# Patient Record
Sex: FEMALE | Race: WHITE | NOT HISPANIC OR LATINO | Employment: FULL TIME | URBAN - METROPOLITAN AREA
[De-identification: names, ages, dates, MRNs, and addresses within clinical notes are randomized per-mention and may not be internally consistent; named-entity substitution may affect disease eponyms.]

---

## 2017-01-11 ENCOUNTER — ONCOLOGY VISIT (OUTPATIENT)
Dept: ONCOLOGY | Facility: CLINIC | Age: 57
End: 2017-01-11
Attending: INTERNAL MEDICINE
Payer: COMMERCIAL

## 2017-01-11 VITALS
DIASTOLIC BLOOD PRESSURE: 88 MMHG | SYSTOLIC BLOOD PRESSURE: 148 MMHG | HEART RATE: 101 BPM | OXYGEN SATURATION: 100 % | RESPIRATION RATE: 16 BRPM | TEMPERATURE: 98.1 F | WEIGHT: 121.4 LBS | BODY MASS INDEX: 22.95 KG/M2

## 2017-01-11 DIAGNOSIS — Z78.0 MENOPAUSE: ICD-10-CM

## 2017-01-11 DIAGNOSIS — Z12.11 ENCOUNTER FOR SCREENING COLONOSCOPY: ICD-10-CM

## 2017-01-11 DIAGNOSIS — Z85.3 PERSONAL HISTORY OF MALIGNANT NEOPLASM OF BREAST: Primary | ICD-10-CM

## 2017-01-11 DIAGNOSIS — R25.2 CRAMP OF LIMB: ICD-10-CM

## 2017-01-11 DIAGNOSIS — Z80.0 FAMILY HISTORY OF COLON CANCER: ICD-10-CM

## 2017-01-11 PROCEDURE — 99211 OFF/OP EST MAY X REQ PHY/QHP: CPT

## 2017-01-11 PROCEDURE — 99214 OFFICE O/P EST MOD 30 MIN: CPT | Performed by: INTERNAL MEDICINE

## 2017-01-11 ASSESSMENT — PAIN SCALES - GENERAL: PAINLEVEL: NO PAIN (0)

## 2017-01-11 NOTE — PATIENT INSTRUCTIONS
Due Dexa now. Due colonoscopy, refer to Dr. Tavo Flores in [facility].   1 yr f/u with labs.

## 2017-01-11 NOTE — PROGRESS NOTES
"Maryse Hanson is a 56 year old female who presents for:  Chief Complaint   Patient presents with     Oncology Clinic Visit     1 year F/U Breast Ca         Initial Vitals:  /88 mmHg  Pulse 101  Temp(Src) 98.1  F (36.7  C) (Oral)  Resp 16  Wt 55.067 kg (121 lb 6.4 oz)  SpO2 100% Estimated body mass index is 22.95 kg/(m^2) as calculated from the following:    Height as of 7/22/16: 1.549 m (5' 1\").    Weight as of this encounter: 55.067 kg (121 lb 6.4 oz).. There is no height on file to calculate BSA. BP completed using cuff size: regular  No Pain (0) No LMP recorded. Patient is postmenopausal. Allergies and medications reviewed.     Medications: Medication refills not needed today.  Pharmacy name entered into [employer]:    [id number] - [place], [place] - [address] [facility][id number] - [place], [place] - [address]    Comments: None    6 minutes for nursing intake (face to face time)   Mireille Fletcher CMA    DISCHARGE PLAN:   1.) Patient to be scheduled for Bone Density and Colonoscopy   2..) Patient to be scheduled for follow up with Dr. Mejia with labs prior  Next appointments: See patient instruction section  Departure Mode: Ambulatory  Accompanied by: self  5 minutes for nursing discharge (face to face time)   Ines Fields RN            "

## 2017-01-11 NOTE — PROGRESS NOTES
CHIEF COMPLAINT AND REASON FOR VISIT: left breast cancer, s/p  Arimidex, now on tamoxifen since 12/2013.   HISTORY OF PRESENT ILLNESS: She was diagnosed with left breast cancer, infiltrating ductal cancer, grade 3, ER/TN positive, HER-2 negative, 8/14 positive nodes, modified left radical mastectomy 03/2006, status post dose-dense Adriamycin and cyclophosphamide followed by Taxol after mastectomy, finished 08/2006. Finished radiation 10/2006. Arimidex was started in 09/2006. She was not willing to be off it. It was continued to 2013 when 10 yrs tamoxifen data came out. It was changed to tamoxifen in 12/2013.    Maryse Hanson had a prophylactic right mastectomy in 12/2009 and immediate reconstruction. The left side is a TRAM flap and the right side is an implant. Final pathology from the right breast indicating proliferative fibrocystic changes, radial scar, sclerosing adenosis, sclerosed fibroadenoma.     PAST MEDICAL HISTORY: Denies.     MEDICATIONS/ALLERGIES: reviewed in [facility]    OB/GYN HISTORY: Hysterectomy, bilateral salpingo-oophorectomy in 04/2007    FAMILY HISTORY: Strong family history of colon cancer, maternal side. She was tested negative  for BRCA1 and BRCA2.     SOCIAL HISTORY: She is .  She works in [employer]. She and daughter moved to [place]. Her twin boys are in college.     REVIEW OF SYSTEMS: Weight has been stable. She is very upbeat, taking vitamin D. No breast complaints. Occasional leg cramp.   She had colonoscopy 3/2015. She had Glu, cr and vit D with her old PMD via [place] in 2015.     PHYSICAL EXAMINATION:   VITAL SIGNS: Blood pressure 148/88, pulse 101, temperature 98.1  F (36.7  C), temperature source Oral, resp. rate 16, weight 55.067 kg (121 lb 6.4 oz), SpO2 100 %.  GENERAL APPEARANCE: A young woman, looks like her stated age, not in acute distress.   BREASTS: Bilateral implant on left side is a TRAM flap and the right side is implant. She also got her bilateral nipple  nguyễn.   HEENT: The patient is normocephalic, atraumatic. Pupils are equal react to light. Sclerae are anicteric. Moist oral mucosa. Negative pharynx. No oral thrush.   NECK: Supple. No jugular venous distention. Thyroid is not palpable.   LYMPH NODES: Superficial lymphadenopathy is not appreciable in the bilateral cervical, supraclavicular, axillary or inguinal adenopathy.   CARDIOVASCULAR: S1, S2 regular with no murmurs or gallops. No carotid or abdominal bruits.   PULMONARY: Lungs are clear to auscultation and percussion bilaterally. There is no wheezing or rhonchi.   GASTROINTESTINAL: Abdomen is soft, nontender. No hepatosplenomegaly. No signs of ascites. No mass appreciable.   MUSCULOSKELETAL/EXTREMITIES: No edema. No cyanotic changes. No signs of joint deformity. No lymphedema.   NEUROLOGIC: Cranial nerves II-XII are grossly intact. Sensation intact. Muscle strength and muscle tone symmetrical all through 5/5.   BACK: No spinal or paraspinal tenderness. No CVA tenderness.   SKIN: No petechiae. No rash. No signs of cellulitis.     CURRENT LAB DATA:   WBC, Marker, liver panel are satisfactory    OLD DATA REVIEWED TODAY WITH SUMMARY:   CXR/BONE SCAN 10/2013 - negative.  PET scan 07/2011 was negative for any malignancy. PET scan in summer of 2010 was negative for malignancy.     ASSESSMENT AND PLAN:   1. Early stage high risk breast cancer 2006 was on 7 yrs of Arimidex.  It was continued to 2013 when 10 yrs tamoxifen data came out. It was changed to tamoxifen in 12/2013.    We talked about the side effects from tamoxifen and how she should be monitored.   We talked about the recurrence pattern associated with ER + breast cancer and how we follow them up long term.    She is a good candidate for 10 yrs of tamoxifen.    She is due dexa.     She is on yrly follow up plan.     2. Leg cramp from tomoxifen. Advise avoiding dehydration and Mag/K intake. She feels it is helping.     3. FH of colon cancer. She has been on  colonoscopy q 2 yrs and she has moved to [facility], she would like to find a new MD doing colonoscopy. Will refer her to Dr. Flores at [facility].     4. Slight rising BP. She is not interested in meds now. We talked about life style changes that may impact the BP.

## 2017-01-11 NOTE — MR AVS SNAPSHOT
After Visit Summary   1/11/2017    Maryse Hanson    MRN: [id number]           Patient Information     Date Of Birth          1960        Visit Information        Provider Department      1/11/2017 2:00 PM Vannessa Mejia MD [facility]        Today's Diagnoses     Personal history of malignant neoplasm of breast    -  1     Cramp of limb         Menopause         Encounter for screening colonoscopy           Care Instructions    Due Dexa now. Due colonoscopy, refer to Dr. Tavo Flores in [facility].   1 yr f/u with labs.        Follow-ups after your visit        Additional Services     GASTROENTEROLOGY ADULT REFERRAL +/- PROCEDURE       Your provider has referred you to Gastroenterology Services.    English    Procedure/Referral: PROCEDURE ONLY - COLONOSCOPY - Reason for procedure: Screening  FM[facility]: [facility]  (all patients will be contacted by GI Geri to schedule appointment - This includes [facility], [facility] and [facility]) - [place] [phone]   [email or web address] Screening, history of colon cancer. Please refer to Dr. Tavo Flores.      Please be aware that coverage of these services is subject to the terms and limitations of your health insurance plan.  Call member services at your health plan with any benefit or coverage questions.  Any procedures must be performed at a [place] facility OR coordinated by your clinic's referral office.    Please bring the following with you to your appointment:    (1) Any X-Rays, CTs or MRIs which have been performed.  Contact the facility where they were done to arrange for  prior to your scheduled appointment.    (2) List of current medications   (3) This referral request   (4) Any documents/labs given to you for this referral                  Your next 10 appointments already scheduled     Jan 20, 2017   "9:30 AM   DX HIP/PELVIS/SPINE with [id number]   [facility] ([facility])    [address]  [address]  [facility] [id number] [phone]           Please do not take any of the following 48 hours prior to your exam: vitamins, calcium tablets, antacids.            Wes 10, 2018  9:15 AM   Return Visit with Vannessa Mejia MD   [facility] ([facility])    [facility]  [address]  [facility] [id number] [phone]              Future tests that were ordered for you today     Open Future Orders        Priority Expected Expires Ordered    DX Hip/Pelvis/Spine Routine  1/11/2018 1/11/2017    Ca27.29  breast tumor marker Routine 12/1/2017 1/10/2018 1/11/2017    CBC with platelets differential Routine 12/1/2017 1/10/2018 1/11/2017    Hepatic panel Routine 12/1/2017 1/10/2018 1/11/2017    DX Hip/Pelvis/Spine Routine  1/11/2018 1/11/2017            Who to contact     If you have questions or need follow up information about today's clinic visit or your schedule please contact [facility] directly at [phone].  Normal or non-critical lab and imaging results will be communicated to you by MyChart, letter or phone within 4 business days after the clinic has received the results. If you do not hear from us within 7 days, please contact the clinic through MyChart or phone. If you have a critical or abnormal lab result, we will notify you by phone as soon as possible.  Submit refill requests through [employer] or call your pharmacy and they will forward the refill request to us. Please allow 3 business days for your refill to be completed.          Additional Information About Your Visit        [employer] Information     [employer] lets you send messages to your doctor, view your test results, renew your prescriptions, schedule appointments and more. To sign up, go to [email or web address] . Click on \"Log in\" on the left side of " "the screen, which will take you to the Welcome page. Then click on \"Sign up Now\" on the right side of the page.     You will be asked to enter the access code listed below, as well as some personal information. Please follow the directions to create your username and password.     Your access code is: [email or web address]  Expires: 2017  2:45 PM     Your access code will  in 90 days. If you need help or a new code, please call your [facility] or [phone].        Care EveryWhere ID     This is your Care EveryWhere ID. This could be used by other organizations to access your [place] medical records  [id number]        Your Vitals Were     Pulse Temperature Respirations Pulse Oximetry          101 98.1  F (36.7  C) (Oral) 16 100%         Blood Pressure from Last 3 Encounters:   17 148/88   16 132/78   16 139/85    Weight from Last 3 Encounters:   17 55.067 kg (121 lb 6.4 oz)   16 55.248 kg (121 lb 12.8 oz)   16 58.06 kg (128 lb)              We Performed the Following     GASTROENTEROLOGY ADULT REFERRAL +/- PROCEDURE        Primary Care Provider    None Specified       No primary provider on file.        Thank you!     Thank you for choosing [facility]  for your care. Our goal is always to provide you with excellent care. Hearing back from our patients is one way we can continue to improve our services. Please take a few minutes to complete the written survey that you may receive in the mail after your visit with us. Thank you!             Your Updated Medication List - Protect others around you: Learn how to safely use, store and throw away your medicines at www.disposemymeds.org.          This list is accurate as of: 17  2:46 PM.  Always use your most recent med list.                   Brand Name Dispense Instructions for use    aspirin 81 MG tablet      Take 1 tablet by mouth daily.       CALCIUM + D + K 750-500-40 MG-UNT-MCG Tabs   Generic drug:  " Calcium-Vitamin D-Vitamin K      1 TABLET DAILY WITH FOOD       cholecalciferol 25206 UNITS capsule    VITAMIN D3    24 capsule    Take 1 tablet twice weekly       MULTI-DAY VITAMINS PO      1 daily       OMEGA-3 FISH OIL PO          tamoxifen 20 MG tablet    NOLVADEX    90 tablet    Take 1 tablet (20 mg) by mouth daily       triamcinolone 0.1 % ointment    KENALOG    30 g    Apply sparingly to affected area three times daily for 14 days.

## 2017-02-07 ENCOUNTER — OFFICE VISIT (OUTPATIENT)
Dept: FAMILY MEDICINE | Facility: CLINIC | Age: 57
End: 2017-02-07
Payer: COMMERCIAL

## 2017-02-07 VITALS
DIASTOLIC BLOOD PRESSURE: 92 MMHG | HEIGHT: 61 IN | TEMPERATURE: 97.8 F | HEART RATE: 86 BPM | OXYGEN SATURATION: 99 % | RESPIRATION RATE: 16 BRPM | SYSTOLIC BLOOD PRESSURE: 152 MMHG | BODY MASS INDEX: 22.6 KG/M2 | WEIGHT: 119.7 LBS

## 2017-02-07 DIAGNOSIS — C50.919 MALIGNANT NEOPLASM OF FEMALE BREAST, UNSPECIFIED LATERALITY, UNSPECIFIED SITE OF BREAST: ICD-10-CM

## 2017-02-07 DIAGNOSIS — J31.0 CHRONIC RHINITIS: ICD-10-CM

## 2017-02-07 DIAGNOSIS — Z78.9 ALCOHOL USE: ICD-10-CM

## 2017-02-07 DIAGNOSIS — Z12.11 COLON CANCER SCREENING: ICD-10-CM

## 2017-02-07 DIAGNOSIS — Z80.0 FAMILY HISTORY OF COLON CANCER IN MOTHER: ICD-10-CM

## 2017-02-07 DIAGNOSIS — I10 BENIGN ESSENTIAL HYPERTENSION: Primary | ICD-10-CM

## 2017-02-07 DIAGNOSIS — E55.9 VITAMIN D DEFICIENCY: ICD-10-CM

## 2017-02-07 DIAGNOSIS — Z90.13 H/O BILATERAL MASTECTOMY: ICD-10-CM

## 2017-02-07 PROCEDURE — 99214 OFFICE O/P EST MOD 30 MIN: CPT | Performed by: FAMILY MEDICINE

## 2017-02-07 RX ORDER — LISINOPRIL 10 MG/1
10 TABLET ORAL DAILY
Qty: 30 TABLET | Refills: 1 | Status: SHIPPED | OUTPATIENT
Start: 2017-02-07 | End: 2017-06-23

## 2017-02-07 NOTE — PROGRESS NOTES
SUBJECTIVE:                                                    Maryse Hanson is a 56 year old female who presents to clinic today for the following health issues:      Chief Complaint   Patient presents with     Establish Care     Patient here today to Establish Care.    Vasomotor rhinitis, wants refill on beclomethasone nasal spray - for post nasal dripping and prevent sinus congestion    She was diagnosed with left breast cancer, infiltrating ductal cancer, grade 3, ER/CA positive, HER-2 negative, 8/14 positive nodes, modified left radical mastectomy 03/2006, -  right side.  Had mastectomy,Initially 2007, left  Than prophylactic mastectomy in 12/2009 and immediate reconstruction.  Total abdominal hysterectomy 2009, no cancer- and was done preventative for breast recurrence all above surgeries in [facility]  She was tested negative for BRCA1 and BRCA2.      She was Under care of Dr Mejia, initially had  Adriamycin and cyclophosphamide followed by Taxol after mastectomy, finished 08/2006.   Finished radiation 10/2006.   Arimidex was started in 09/2006.   It was continued to 2013 when 10 yrs tamoxifen data came out.   It was changed to tamoxifen in 12/2013.  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       Past medical and family  "history,medications, allergies and problem list reviewed       ROS: 10 point ROS neg other than the symptoms noted above in the HPI.    EXAM:/68 mmHg  Pulse 86  Temp(Src) 97.8  F (36.6  C) (Oral)  Resp 16  Ht 5' 1\" (1.549 m)  Wt 119 lb 11.2 oz (54.296 kg)  BMI 22.63 kg/m2  SpO2 99%  Breastfeeding? No  Constitutional: healthy, alert and no distress   Head: Normocephalic. No masses, lesions, tenderness or abnormalities  Eyes; PERRLA EOMI  Neck: Neck supple. No adenopathy. Thyroid symmetric, normal size  Ear,Nose, clear . Throat: normal, oropharynx  Cardiovascular: negative,  RRR. No murmurs, clicks gallops or rub  Respiratory:  Lungs clear.no wheezing, crackles.  Abdomen: Abdomen soft, non-tender.no organomegaly, No CVA tenderness.  NEURO: Gait normal. Reflexes normal and symmetric. Sensation grossly WNL.  Psychiatric: mentation appears normal and affect normal/bright    ASSESSMENT / PLAN:  [place] cancer; Early stage high risk breast cancer 2006 was on 7 yrs of Arimidex.  It was continued to 2013 when 10 yrs tamoxifen data came out. It was changed to tamoxifen in 12/2013.Under care of Dr Mejia, Vannessa Moreno MD- last OV in jan 2017  left breast cancer, infiltrating ductal cancer, grade 3, ER/AZ positive, HER-2 negative, 8/14 positive nodes, modified left radical mastectomy 03/2006, -  right side.  Had mastectomy,Initially 2007, left  Than prophylactic mastectomy in 12/2009 and immediate reconstruction.  Total abdominal hysterectomy 2009, no cancer- and was done preventative for breast recurrence all above surgeries in [facility]  She was tested negative for BRCA1 and BRCA2      Hypertension  New diagnoses,Uncontrolled  Start lisinopril 10 mg once daily -follow up in 2-4 weeks  She is leaning towards life style changes- though did request to have prescription sent over- will keep us posted , if does not want to start medications  Potential medication side effects were discussed with the patient; let me know if any " occur.      Alcohol use- to limits less than 5 drinks a week      (E55.9) Vitamin D deficiency  Plan: 25 Hydroxyvitamin D2 and D3- future      (J31.0) Chronic rhinitis  Plan: Beclomethasone Dipropionate (QNASL) 80 MCG/ACT       AERS Nasal Spray        (Z80.0) Family history of colon cancer in mother  (Z12.11) Colon cancer screening  Plan: has been getting every 2 year- next due in 2017.family history of colon cancer- mom , materanl uncle & 2 aunties and mom's half sister  of colon cancer            Potential medication side effects were discussed with the patient; let me know if any occur.    The patient indicates understanding of these issues and agrees with the plan.      Julieth Perez

## 2017-02-07 NOTE — PATIENT INSTRUCTIONS
The main side effects to watch for are light headedness, a dry cough, or rare allergic reactions, such as swelling of the lips or tongue.   Most people tolerate the medication well.    It is important to check the blood tests again in 2-4 weeks to be sure you tolerate it.  Your blood pressure can be checked then too.      (I10) Benign essential hypertension  (primary encounter diagnosis)  Comment: wants to think it over   fup in 4 weeks for recheck- either nurse only or ROV for recheck  Plan: lisinopril (PRINIVIL/ZESTRIL) 10 MG tablet          (C50.911) Malignant neoplasm of right female breast, unspecified site of breast (H)      (Z90.13) H/O bilateral mastectomy      (E55.9) Vitamin D deficiency  Plan: 25 Hydroxyvitamin D2 and D3      (J31.0) Chronic rhinitis  Plan: Beclomethasone Dipropionate (QNASL) 80 MCG/ACT       AERS Nasal Spray      (Z12.11) Colon cancer screening  Plan: has been getting every 2 year- next due in 2017.family history of colon cancer- mom , materanl uncle & 2 aunties and mom's half sister  of colon cancer      (Z80.0) Family history of colon cancer in mother  Colonoscopy as per Gastroenterology     (Z78.9) Alcohol use  Plan:  Cut back to 5 or less drinks a week

## 2017-02-07 NOTE — MR AVS SNAPSHOT
After Visit Summary   2017    Maryse Hanson    MRN: [id number]           Patient Information     Date Of Birth          1960        Visit Information        Provider Department      2017 3:30 PM Julieth Perez MD [facility]        Today's Diagnoses     Benign essential hypertension    -  1     Malignant neoplasm of right female breast, unspecified site of breast (H)         H/O bilateral mastectomy         Vitamin D deficiency         Chronic rhinitis         Colon cancer screening         Family history of colon cancer in mother         Alcohol use           Care Instructions    The main side effects to watch for are light headedness, a dry cough, or rare allergic reactions, such as swelling of the lips or tongue.   Most people tolerate the medication well.    It is important to check the blood tests again in 2-4 weeks to be sure you tolerate it.  Your blood pressure can be checked then too.      (I10) Benign essential hypertension  (primary encounter diagnosis)  Comment: wants to think it over   fup in 4 weeks for recheck- either nurse only or ROV for recheck  Plan: lisinopril (PRINIVIL/ZESTRIL) 10 MG tablet          (C50.911) Malignant neoplasm of right female breast, unspecified site of breast (H)      (Z90.13) H/O bilateral mastectomy      (E55.9) Vitamin D deficiency  Plan: 25 Hydroxyvitamin D2 and D3      (J31.0) Chronic rhinitis  Plan: Beclomethasone Dipropionate (QNASL) 80 MCG/ACT       AERS Nasal Spray      (Z12.11) Colon cancer screening  Plan: has been getting every 2 year- next due in 2017.family history of colon cancer- mom , materanl uncle & 2 aunties and mom's half sister  of colon cancer      (Z80.0) Family history of colon cancer in mother  Colonoscopy as per Gastroenterology     (Z78.9) Alcohol use  Plan:  Cut back to 5 or less drinks a week            Follow-ups after your visit        Your next 10 appointments already scheduled     [place]  "02, 2017   Procedure with Trev Burks MD   [facility] ([facility])    [address]  [place] [id number] [phone]           [facility] is located at [address] [place]            Wes 10, 2018  9:15 AM   Return Visit with Vannessa Mejia MD   [facility] ([facility])    [facility]  [address]  [facility] [address] [phone]              Future tests that were ordered for you today     Open Future Orders        Priority Expected Expires Ordered    25 Hydroxyvitamin D2 and D3 Routine  2/7/2018 2/7/2017            Who to contact     If you have questions or need follow up information about today's clinic visit or your schedule please contact [facility] directly at [phone].  Normal or non-critical lab and imaging results will be communicated to you by Sferrahart, letter or phone within 4 business days after the clinic has received the results. If you do not hear from us within 7 days, please contact the clinic through Sferrahart or phone. If you have a critical or abnormal lab result, we will notify you by phone as soon as possible.  Submit refill requests through shopp or call your pharmacy and they will forward the refill request to us. Please allow 3 business days for your refill to be completed.          Additional Information About Your Visit        SferraharScubaTribe Information     shopp lets you send messages to your doctor, view your test results, renew your prescriptions, schedule appointments and more. To sign up, go to [email or web address] . Click on \"Log in\" on the left side of the screen, which will take you to the Welcome page. Then click on \"Sign up Now\" on the right side of the page.     You will be asked to enter the access code listed below, as well as some personal information. Please follow the directions to create your username and password.     Your " "access code is: [email or web address]  Expires: 2017  2:45 PM     Your access code will  in 90 days. If you need help or a new code, please call your [facility] clinic or [phone].        Care EveryWhere ID     This is your Care EveryWhere ID. This could be used by other organizations to access your [facility] medical records  [id number]        Your Vitals Were     Pulse Temperature Respirations Height BMI (Body Mass Index) Pulse Oximetry    86 97.8  F (36.6  C) (Oral) 16 5' 1\" (1.549 m) 22.63 kg/m2 99%    Breastfeeding?                   No            Blood Pressure from Last 3 Encounters:   17 152/92   17 148/88   16 132/78    Weight from Last 3 Encounters:   17 119 lb 11.2 oz (54.296 kg)   17 121 lb 6.4 oz (55.067 kg)   16 121 lb 12.8 oz (55.248 kg)                 Today's Medication Changes          These changes are accurate as of: 17  4:33 PM.  If you have any questions, ask your nurse or doctor.               Start taking these medicines.        Dose/Directions    Beclomethasone Dipropionate 80 MCG/ACT Aers Nasal Spray   Commonly known as:  QNASL   Used for:  Chronic rhinitis   Started by:  Julieth Perez MD        Dose:  1 spray   Spray 1 spray into both nostrils daily as needed   Quantity:  8.7 g   Refills:  11       lisinopril 10 MG tablet   Commonly known as:  PRINIVIL/ZESTRIL   Used for:  Benign essential hypertension   Started by:  Julieth Perez MD        Dose:  10 mg   Take 1 tablet (10 mg) by mouth daily   Quantity:  30 tablet   Refills:  1         Stop taking these medicines if you haven't already. Please contact your care team if you have questions.     beclomethasone 80 MCG/ACT Inhaler   Commonly known as:  QVAR   Stopped by:  Julieth Perez MD                Where to get your medicines      These medications were sent to [id number] [place], [place] - [address]  [address] [facility] [address]     Phone:  [phone] "    - Beclomethasone Dipropionate 80 MCG/ACT Aers Nasal Spray  - lisinopril 10 MG tablet             Primary Care Provider    None Specified       No primary provider on file.        Thank you!     Thank you for choosing [facility]  for your care. Our goal is always to provide you with excellent care. Hearing back from our patients is one way we can continue to improve our services. Please take a few minutes to complete the written survey that you may receive in the mail after your visit with us. Thank you!             Your Updated Medication List - Protect others around you: Learn how to safely use, store and throw away your medicines at www.disposemymeds.org.          This list is accurate as of: 2/7/17  4:33 PM.  Always use your most recent med list.                   Brand Name Dispense Instructions for use    aspirin 81 MG tablet      Take 1 tablet by mouth daily.       Beclomethasone Dipropionate 80 MCG/ACT Aers Nasal Spray    QNASL    8.7 g    Spray 1 spray into both nostrils daily as needed       cholecalciferol 76710 UNITS capsule    VITAMIN D3    24 capsule    Take 1 tablet twice weekly       lisinopril 10 MG tablet    PRINIVIL/ZESTRIL    30 tablet    Take 1 tablet (10 mg) by mouth daily       OMEGA-3 FISH OIL PO          tamoxifen 20 MG tablet    NOLVADEX    90 tablet    Take 1 tablet (20 mg) by mouth daily       triamcinolone 0.1 % ointment    KENALOG    30 g    Apply sparingly to affected area three times daily for 14 days.

## 2017-02-07 NOTE — NURSING NOTE
"Chief Complaint   Patient presents with     Establish Care     /68 mmHg  Pulse 86  Temp(Src) 97.8  F (36.6  C) (Oral)  Resp 16  Ht 5' 1\" (1.549 m)  Wt 119 lb 11.2 oz (54.296 kg)  BMI 22.63 kg/m2  SpO2 99%  Breastfeeding? No Estimated body mass index is 22.63 kg/(m^2) as calculated from the following:    Height as of this encounter: 5' 1\" (1.549 m).    Weight as of this encounter: 119 lb 11.2 oz (54.296 kg).  bp completed using cuff size: regular      left    Health Maintenance Due Pending Provider Review:  Mammogram and Pap Smear    Patient has had complete hyst- no need for pap per patient. (2009)  No mammo needed- hx of breast cancer- had complete Mastectomy.    Danielle Petty MA  [facility]    "

## 2017-02-07 NOTE — Clinical Note
Patient had Left breast mastectomy in 2007-  Right breast mastectomy in 2006- Total Abdominal Hysterectomy 2009

## 2017-02-07 NOTE — Clinical Note
For Health Maintenance- patient has had complete mastectomy in 2009 so no need for Mammogram.  Also, had complete hysterectomy in 2009- no need for future pap smears.

## 2017-02-21 ENCOUNTER — TELEPHONE (OUTPATIENT)
Dept: ONCOLOGY | Facility: CLINIC | Age: 57
End: 2017-02-21

## 2017-02-21 DIAGNOSIS — J31.0 CHRONIC RHINITIS: ICD-10-CM

## 2017-02-21 NOTE — TELEPHONE ENCOUNTER
Patient called, [facility] will [facility] to [facility] her bone [facility]. Writer will follow up on bone density results. Ines Fields RN

## 2017-02-21 NOTE — TELEPHONE ENCOUNTER
Pending Prescriptions:                       Disp   Refills    Beclomethasone Dipropionate (QNASL) 80 MC*8.7 g  11           Sig: Spray 1 spray into both nostrils daily as needed          Last Written Prescription Date: 02/07/2017  Last Fill Quantity: 8.7g,  # refills: 11   Last Office Visit with [facility], [facility] or [facility] prescribing provider: 02/07/2017

## 2017-02-21 NOTE — TELEPHONE ENCOUNTER
Denied  Refill request too early; Rx sent 2/7/2017 #8.7 with 11 refills for 1 year  Radha ALVARES RN

## 2017-03-08 ENCOUNTER — TELEPHONE (OUTPATIENT)
Dept: ONCOLOGY | Facility: CLINIC | Age: 57
End: 2017-03-08

## 2017-03-08 NOTE — TELEPHONE ENCOUNTER
Patient called left message on patient's voice mail for her to call clinic regarding her Dexa scan and if she rescheduled it which was [facility] at [facility]. Ines Fields RN

## 2017-03-15 DIAGNOSIS — J31.0 CHRONIC RHINITIS: ICD-10-CM

## 2017-03-15 NOTE — TELEPHONE ENCOUNTER
Reason for Call:  Medication or medication refill:    Do you use a [facility]?  Name of the pharmacy and phone number for the current request:     [id number] - [place], [place] - [address]  [id number] [place] [place] - [address]        Name of the medication requested: Beclomethasone Dipropionate    Other request: pt needs increase in dosage due copay reasons for Beclomethasone Dipropionate. Noah advise    Can we leave a detailed message on this number? YES    Phone number patient can be reached at: Home number on file [phone] (home)    Best Time: any    Call taken on 3/15/2017 at 9:00 AM by Rene Mandel

## 2017-03-15 NOTE — TELEPHONE ENCOUNTER
i have signed the nasal spray    Message needs clarification  Does she want this one prescribed or another one- from  Before.  Please t-up the one requested for me to review and sign

## 2017-03-15 NOTE — TELEPHONE ENCOUNTER
As,  Pt has a prescription for Beclomethasone Dipropionate (QNASL) 80 MCG/ACT AERS Nasal [place] that ENT from [place] prescribed and copay was 50$.  She saw you and was prescribed it as 1 spray daily and copay increased to 100$. She did not pick this up due to cost.  Has been out for a few weeks and having a lot os sinus issues, HA ect.  Requesting old sig from ENT for 1 spray in each nostril bid.  She says in the am she uses 1 spray in one nostril and in the pm uses one spray in other nostril.    She has tried other nasal sprays in the past but was very fatigued and ENT had thought it was a common preservative.  She is willing to try another nasal spray if needed but would need to find out what she had last with the preservative.  Requesting previous sig.- started order. Please review/sign advise.      Please advise.  Thanks,  Sarah Lucero RN

## 2017-03-17 ENCOUNTER — HOSPITAL ENCOUNTER (OUTPATIENT)
Dept: BONE DENSITY | Facility: CLINIC | Age: 57
Discharge: HOME | End: 2017-03-17
Attending: INTERNAL MEDICINE | Admitting: INTERNAL MEDICINE
Payer: COMMERCIAL

## 2017-03-17 DIAGNOSIS — Z78.0 MENOPAUSE: ICD-10-CM

## 2017-03-17 PROCEDURE — 77080 DXA BONE DENSITY AXIAL: CPT

## 2017-03-31 DIAGNOSIS — C50.919 BREAST CANCER (H): Primary | ICD-10-CM

## 2017-03-31 RX ORDER — TAMOXIFEN CITRATE 20 MG/1
20 TABLET ORAL DAILY
Qty: 90 TABLET | Refills: 3 | Status: SHIPPED | OUTPATIENT
Start: 2017-03-31 | End: 2018-03-30

## 2017-04-13 ENCOUNTER — TELEPHONE (OUTPATIENT)
Dept: ONCOLOGY | Facility: CLINIC | Age: 57
End: 2017-04-13

## 2017-04-13 NOTE — TELEPHONE ENCOUNTER
Patient called with bone density result, instructed patient to continue calcium, vitamin D. Patient is also on Tamoxifen. Ines Fields RN

## 2017-04-24 ENCOUNTER — OFFICE VISIT (OUTPATIENT)
Dept: FAMILY MEDICINE | Facility: CLINIC | Age: 57
End: 2017-04-24
Payer: COMMERCIAL

## 2017-04-24 VITALS
HEART RATE: 73 BPM | DIASTOLIC BLOOD PRESSURE: 90 MMHG | SYSTOLIC BLOOD PRESSURE: 150 MMHG | TEMPERATURE: 98.5 F | BODY MASS INDEX: 21.71 KG/M2 | HEIGHT: 61 IN | OXYGEN SATURATION: 100 % | WEIGHT: 115 LBS

## 2017-04-24 DIAGNOSIS — J31.0 CHRONIC RHINITIS: ICD-10-CM

## 2017-04-24 DIAGNOSIS — F40.243 FLYING PHOBIA: ICD-10-CM

## 2017-04-24 DIAGNOSIS — R09.82 POST-NASAL DRIP: ICD-10-CM

## 2017-04-24 DIAGNOSIS — I10 BENIGN ESSENTIAL HYPERTENSION: Primary | ICD-10-CM

## 2017-04-24 PROCEDURE — 99214 OFFICE O/P EST MOD 30 MIN: CPT | Performed by: FAMILY MEDICINE

## 2017-04-24 RX ORDER — LORAZEPAM 0.5 MG/1
0.25-0.5 TABLET ORAL EVERY 8 HOURS PRN
Qty: 10 TABLET | Refills: 1 | Status: SHIPPED | OUTPATIENT
Start: 2017-04-24 | End: 2018-07-06

## 2017-04-24 NOTE — NURSING NOTE
"Chief Complaint   Patient presents with     Recheck Medication       Initial /90 (BP Location: Right arm, Cuff Size: Adult Regular)  Pulse 73  Temp 98.5  F (36.9  C) (Oral)  Ht 5' 1\" (1.549 m)  Wt 115 lb (52.2 kg)  SpO2 100%  Breastfeeding? No  BMI 21.73 kg/m2 Estimated body mass index is 21.73 kg/(m^2) as calculated from the following:    Height as of this encounter: 5' 1\" (1.549 m).    Weight as of this encounter: 115 lb (52.2 kg).  Medication Reconciliation: complete.  UMM Cheung         "

## 2017-04-24 NOTE — PROGRESS NOTES
SUBJECTIVE:                                                    Maryse Hanson is a 56 year old female who presents to clinic today for the following health issues:    Medication Followup of all medications     Taking Medication as prescribed: not taking lisinopril and nasal spray     Side Effects:  None    Medication Helping Symptoms:  not applicable     Uterus shock about several issues    1. Benign essential hypertension  Patient didn't really want to start her high blood pressure medication and wanted to talk about it again  She has a family history of high blood pressure in both of her siblings take high blood pressure medication she just feels disappointed because she is very fit  We reviewed the possible side effects of lisinopril hydrochlorothiazide and Norvasc  Patient decided that she will go ahead and take lisinopril after all     2. Post-nasal drip  She has a prescription nose spray and needs a change to the sig      3. Chronic rhinitis      4. Flying phobia    Patient has been having mild to moderate symptoms of flying related phobia and anxiety  Course: Stable  Current symptoms are described by the PHQ9/CLINTON below.  No flowsheet data found.  No flowsheet data found.    Previous treatment modalities employed include lorazepam medication    No known medical causes of depression or Anxiety.    Problem pert ROS:  Musculoskeletal: Normal; movements are symmetrical, no weakness  Neuro: Normal; no tremor  Psych: No suicidal thoughts or plan. No halluciations.      Discussed potential for impairment and dependence with benzo medications, a limited Rx prescribed        Current Outpatient Prescriptions   Medication     Beclomethasone Dipropionate (QNASL) 80 MCG/ACT AERS Nasal Spray     LORazepam (ATIVAN) 0.5 MG tablet     tamoxifen (NOLVADEX) 20 MG tablet     cholecalciferol (VITAMIN D3) 47375 UNITS capsule     Omega-3 Fatty Acids (OMEGA-3 FISH OIL PO)     aspirin 81 MG tablet     lisinopril  "(PRINIVIL/ZESTRIL) 10 MG tablet     triamcinolone (KENALOG) 0.1 % ointment     No current facility-administered medications for this visit.      I have reviewed the patient's medical history in detail; there are no changes to the history as noted in EpicCare.    ROS: As per HPI.  Constitutional: no fevers/sweats/chills  CV: no chest pain  RESP: no shortness of breath/wheezing  GI: no nausea/vomiting/diarrhea  : no dysuria, normal urinary pattern    EXAM  /90 (BP Location: Right arm, Cuff Size: Adult Regular)  Pulse 73  Temp 98.5  F (36.9  C) (Oral)  Ht 5' 1\" (1.549 m)  Wt 115 lb (52.2 kg)  SpO2 100%  Breastfeeding? No  BMI 21.73 kg/m2  Gen: Healthy appearing female in no apparent distress  Psych exam:  GROOMING: Adequately groomed.  ATTIRE: Appropriate.  AGE: Appears stated.  BEHAVIOR TOWARDS EXAMINER: Cooperative.  MOTOR ACTIVITY: Unremarkable.  EYE CONTACT: Appropriate.  MOOD: Anxious  AFFECT: Congruent with content of speech.  SPEECH/LANGUAGE: Unremarkable.  ATTENTION: Unremarkable.    ASSESSMENT/PLAN:    ICD-10-CM    1. Benign essential hypertension I10 Renal panel (Alb, BUN, Ca, Cl, CO2, Creat, Gluc, Phos, K, Na)   2. Post-nasal drip R09.82    3. Chronic rhinitis J31.0 Renal panel (Alb, BUN, Ca, Cl, CO2, Creat, Gluc, Phos, K, Na)     Beclomethasone Dipropionate (QNASL) 80 MCG/ACT AERS Nasal [place]   4. Flying phobia F40.243 LORazepam (ATIVAN) 0.5 MG tablet       Start lisinopril and follow-up in 4 weeks for lab check  Patient will take blood pressures at home    Change to nose spray prescription    Discussed limited use of lorazepam for her flight purposes only, do not drive after taking medication    Gomez العراقي MD [facility]    "

## 2017-04-24 NOTE — MR AVS SNAPSHOT
After Visit Summary   4/24/2017    Maryse Hanson    MRN: [id number]           Patient Information     Date Of Birth          1960        Visit Information        Provider Department      4/24/2017 10:00 AM Gomez العراقي MD [facility]        Today's Diagnoses     Benign essential hypertension    -  1    Post-nasal drip        Chronic rhinitis        Flying phobia           Follow-ups after your visit        Follow-up notes from your care team     Return in about 4 weeks (around 5/22/2017) for Lab Work.      Your next 10 appointments already scheduled     May 22, 2017  8:30 AM CDT   LAB with [facility]   [facility] ([facility])    [address] [facility] [address] [phone]           Patient must bring picture ID.  Patient should be prepared to give a urine specimen  Please do not eat 10-12 hours before your appointment if you are coming in fasting for labs on lipids, cholesterol, or glucose (sugar).  Pregnant women should follow their Care Team instructions. Water with medications is okay. Do not drink coffee or other fluids.   If you have concerns about taking  your medications, please ask at office or if scheduling via CAD Best, send a message by clicking on Secure Messaging, Message Your Care Team.            Jun 09, 2017   Procedure with Trev Burks MD   [facility] ([facility])    [address]  [place] [address]   [phone]           [facility] is located at [address] [place]            Wes 10, 2018  9:15 AM CST   Return Visit with Vannessa Mejia MD   [facility] ([facility])    [facility]  [address]  [facility] [address] [phone]              Future tests that were ordered for you today     Open Future Orders        Priority Expected Expires Ordered    Renal panel (Alb, BUN, Ca, Cl,  "CO2, Creat, Gluc, Phos, K, Na) Routine  2018            Who to contact     If you have questions or need follow up information about today's clinic visit or your schedule please contact [facility] directly at [phone].  Normal or non-critical lab and imaging results will be communicated to you by MyChart, letter or phone within 4 business days after the clinic has received the results. If you do not hear from us within 7 days, please contact the clinic through MyChart or phone. If you have a critical or abnormal lab result, we will notify you by phone as soon as possible.  Submit refill requests through Valcon or call your pharmacy and they will forward the refill request to us. Please allow 3 business days for your refill to be completed.          Additional Information About Your Visit        MyChart Information     Valcon lets you send messages to your doctor, view your test results, renew your prescriptions, schedule appointments and more. To sign up, go to [email or web address] . Click on \"Log in\" on the left side of the screen, which will take you to the Welcome page. Then click on \"Sign up Now\" on the right side of the page.     You will be asked to enter the access code listed below, as well as some personal information. Please follow the directions to create your username and password.     Your access code is: [email or web address]  Expires: 2017 10:47 AM     Your access code will  in 90 days. If you need help or a new code, please call your [facility] clinic or [phone].        Care EveryWhere ID     This is your Care EveryWhere ID. This could be used by other organizations to access your [facility] medical records  [id number]        Your Vitals Were     Pulse Temperature Height Pulse Oximetry Breastfeeding? BMI (Body Mass Index)    73 98.5  F (36.9  C) (Oral) 5' 1\" (1.549 m) 100% No 21.73 kg/m2       Blood Pressure from Last 3 Encounters:   17 150/90   17 " (!) 152/92   01/11/17 148/88    Weight from Last 3 Encounters:   04/24/17 115 lb (52.2 kg)   02/07/17 119 lb 11.2 oz (54.3 kg)   01/11/17 121 lb 6.4 oz (55.1 kg)                 Today's Medication Changes          These changes are accurate as of: 4/24/17 10:47 AM.  If you have any questions, ask your nurse or doctor.               Start taking these medicines.        Dose/Directions    LORazepam 0.5 MG tablet   Commonly known as:  ATIVAN   Used for:  Flying phobia   Started by:  Gomez العراقي MD        Dose:  0.25-0.5 mg   Take 0.5-1 tablets (0.25-0.5 mg) by mouth every 8 hours as needed for anxiety Take 30 minutes prior to departure.  Do not operate a vehicle after taking this medication   Quantity:  10 tablet   Refills:  1         These medicines have changed or have updated prescriptions.        Dose/Directions    Beclomethasone Dipropionate 80 MCG/ACT Aers Nasal Spray   Commonly known as:  QNASL   This may have changed:  how much to take   Used for:  Chronic rhinitis   Changed by:  Gomez العراقي MD        Dose:  2 spray   Spray 2 sprays into both nostrils daily   Quantity:  8.7 g   Refills:  11            Where to get your medicines      These medications were sent to [id number] [place], [place] - [address]  [address] [facility] [address]     Phone:  [phone]     Beclomethasone Dipropionate 80 MCG/ACT Aers Nasal Spray         Some of these will need a paper prescription and others can be bought over the counter.  Ask your nurse if you have questions.     Bring a paper prescription for each of these medications     LORazepam 0.5 MG tablet                Primary Care Provider    None       No address on file        Thank you!     Thank you for choosing [facility]  for your care. Our goal is always to provide you with excellent care. Hearing back from our patients is one way we can continue to improve our services. Please take a few minutes to complete the  written survey that you may receive in the mail after your visit with us. Thank you!             Your Updated Medication List - Protect others around you: Learn how to safely use, store and throw away your medicines at www.disposemymeds.org.          This list is accurate as of: 4/24/17 10:47 AM.  Always use your most recent med list.                   Brand Name Dispense Instructions for use    aspirin 81 MG tablet      Take 1 tablet by mouth daily.       Beclomethasone Dipropionate 80 MCG/ACT Aers Nasal Spray    QNASL    8.7 g    Spray 2 sprays into both nostrils daily       cholecalciferol 13588 UNITS capsule    VITAMIN D3    24 capsule    Take 1 tablet twice weekly       lisinopril 10 MG tablet    PRINIVIL/ZESTRIL    30 tablet    Take 1 tablet (10 mg) by mouth daily       LORazepam 0.5 MG tablet    ATIVAN    10 tablet    Take 0.5-1 tablets (0.25-0.5 mg) by mouth every 8 hours as needed for anxiety Take 30 minutes prior to departure.  Do not operate a vehicle after taking this medication       OMEGA-3 FISH OIL PO          tamoxifen 20 MG tablet    NOLVADEX    90 tablet    Take 1 tablet (20 mg) by mouth daily       triamcinolone 0.1 % ointment    KENALOG    30 g    Apply sparingly to affected area three times daily for 14 days.

## 2017-05-02 ENCOUNTER — TELEPHONE (OUTPATIENT)
Dept: FAMILY MEDICINE | Facility: CLINIC | Age: 57
End: 2017-05-02

## 2017-05-02 NOTE — TELEPHONE ENCOUNTER
Reason for Call:  Medication or medication refill:    Do you use a [facility]?  Name of the pharmacy and phone number for the current request:       [id number] [address]    Name of the medication requested: ativan    Other request: patient said she spoke with Dr. العراقي about a script for this medication to use when traveling.  She will be traveling on Friday this week, and is wondering if she would be able to  a prescription prior to her travels.    Can we leave a detailed message on this number? YES    Phone number patient can be reached at: Home number on file [phone] (home)    Best Time: any time    Call taken on 5/2/2017 at 8:48 AM by Jenny Santana  .

## 2017-05-02 NOTE — TELEPHONE ENCOUNTER
Rx for Lorazepam 4/24/17.  Spoke with [facility]. They did not receive.  Verbally called in Rx  VM left informing patient  Sarah Bean RN

## 2017-06-06 RX ORDER — ONDANSETRON 2 MG/ML
4 INJECTION INTRAMUSCULAR; INTRAVENOUS
Status: CANCELLED | OUTPATIENT
Start: 2017-06-06

## 2017-06-06 RX ORDER — LIDOCAINE 40 MG/G
CREAM TOPICAL
Status: CANCELLED | OUTPATIENT
Start: 2017-06-06

## 2017-06-07 ENCOUNTER — ALLIED HEALTH/NURSE VISIT (OUTPATIENT)
Dept: NURSING | Facility: CLINIC | Age: 57
End: 2017-06-07
Payer: COMMERCIAL

## 2017-06-07 VITALS — DIASTOLIC BLOOD PRESSURE: 90 MMHG | SYSTOLIC BLOOD PRESSURE: 140 MMHG

## 2017-06-07 DIAGNOSIS — I10 BENIGN ESSENTIAL HYPERTENSION: Primary | ICD-10-CM

## 2017-06-07 DIAGNOSIS — I10 BENIGN ESSENTIAL HYPERTENSION: ICD-10-CM

## 2017-06-07 DIAGNOSIS — E55.9 VITAMIN D DEFICIENCY: ICD-10-CM

## 2017-06-07 DIAGNOSIS — J31.0 CHRONIC RHINITIS: ICD-10-CM

## 2017-06-07 LAB
ALBUMIN SERPL-MCNC: 4.1 G/DL (ref 3.4–5)
ANION GAP SERPL CALCULATED.3IONS-SCNC: 6 MMOL/L (ref 3–14)
BUN SERPL-MCNC: 10 MG/DL (ref 7–30)
CALCIUM SERPL-MCNC: 8.9 MG/DL (ref 8.5–10.1)
CHLORIDE SERPL-SCNC: 105 MMOL/L (ref 94–109)
CO2 SERPL-SCNC: 27 MMOL/L (ref 20–32)
CREAT SERPL-MCNC: 0.65 MG/DL (ref 0.52–1.04)
GFR SERPL CREATININE-BSD FRML MDRD: ABNORMAL ML/MIN/1.7M2
GLUCOSE SERPL-MCNC: 62 MG/DL (ref 70–99)
PHOSPHATE SERPL-MCNC: 3.5 MG/DL (ref 2.5–4.5)
POTASSIUM SERPL-SCNC: 4.1 MMOL/L (ref 3.4–5.3)
SODIUM SERPL-SCNC: 138 MMOL/L (ref 133–144)

## 2017-06-07 PROCEDURE — 36415 COLL VENOUS BLD VENIPUNCTURE: CPT | Performed by: FAMILY MEDICINE

## 2017-06-07 PROCEDURE — 82306 VITAMIN D 25 HYDROXY: CPT | Performed by: FAMILY MEDICINE

## 2017-06-07 PROCEDURE — 99207 ZZC NO CHARGE NURSE ONLY: CPT

## 2017-06-07 PROCEDURE — 80069 RENAL FUNCTION PANEL: CPT | Performed by: FAMILY MEDICINE

## 2017-06-07 NOTE — MR AVS SNAPSHOT
After Visit Summary   6/7/2017    Maryse Hanson    MRN: [id number]           Patient Information     Date Of Birth          1960        Visit Information        Provider Department      6/7/2017 9:00 AM UP NURSE [place] [facility]        Today's Diagnoses     Benign essential hypertension    -  1       Follow-ups after your visit        Your next 10 appointments already scheduled     Jun 09, 2017  9:45 AM CDT   [facility] Endoscopy with Trev Burks MD   [facility] ([facility])    [facility] ([facility])   [phone]            Jun 09, 2017   Procedure with Trev Burks MD   [facility] Endoscopy ([facility])    [address]  [place] [address] [phone]           [facility] is located at [address] [place]            Wes 10, 2018  9:15 AM CST   Return Visit with Vannessa Mejia MD   [facility] ([facility])    n Medical Ctr [facility]  [address]  [facility] [address] [phone]              Who to contact     If you have questions or need follow up information about today's clinic visit or your schedule please contact [facility] directly at [phone].  Normal or non-critical lab and imaging results will be communicated to you by MyChart, letter or phone within 4 business days after the clinic has received the results. If you do not hear from us within 7 days, please contact the clinic through MyChart or phone. If you have a critical or abnormal lab result, we will notify you by phone as soon as possible.  Submit refill requests through [employer] or call your pharmacy and they will forward the refill request to us. Please allow 3 business days for your refill to be completed.          Additional Information About Your Visit        MyChart Information      "[employer] lets you send messages to your doctor, view your test results, renew your prescriptions, schedule appointments and more. To sign up, go to [email or web address] . Click on \"Log in\" on the left side of the screen, which will take you to the Welcome page. Then click on \"Sign up Now\" on the right side of the page.     You will be asked to enter the access code listed below, as well as some personal information. Please follow the directions to create your username and password.     Your access code is: -[email or web address]  Expires: 2017 10:47 AM     Your access code will  in 90 days. If you need help or a new code, please call your [place] clinic or [phone].        Care EveryWhere ID     This is your Care EveryWhere ID. This could be used by other organizations to access your [place] medical records  [id number]         Blood Pressure from Last 3 Encounters:   17 140/90   17 150/90   17 (!) 152/92    Weight from Last 3 Encounters:   17 115 lb (52.2 kg)   17 119 lb 11.2 oz (54.3 kg)   17 121 lb 6.4 oz (55.1 kg)              Today, you had the following     No orders found for display       Primary Care Provider    None       No address on file        Thank you!     [facility]  [facility]. Our goal is always to provide you with excellent care. Hearing back from our patients is one way we can continue to improve our services. Please take a few minutes to complete the written survey that you may receive in the mail after your visit with us. Thank you!             Your Updated Medication List - Protect others around you: Learn how to safely use, store and throw away your medicines at www.disposemymeds.org.          This list is accurate as of: 17  9:51 AM.  Always use your most recent med list.                   Brand Name Dispense Instructions for use    aspirin 81 MG tablet      Take 1 tablet by mouth daily.       " Beclomethasone Dipropionate 80 MCG/ACT Aers Nasal Spray    QNASL    8.7 g    Spray 2 sprays into both nostrils daily       cholecalciferol 49555 UNITS capsule    VITAMIN D3    24 capsule    Take 1 tablet twice weekly       lisinopril 10 MG tablet    PRINIVIL/ZESTRIL    30 tablet    Take 1 tablet (10 mg) by mouth daily       LORazepam 0.5 MG tablet    ATIVAN    10 tablet    Take 0.5-1 tablets (0.25-0.5 mg) by mouth every 8 hours as needed for anxiety Take 30 minutes prior to departure.  Do not operate a vehicle after taking this medication       OMEGA-3 FISH OIL PO          tamoxifen 20 MG tablet    NOLVADEX    90 tablet    Take 1 tablet (20 mg) by mouth daily       triamcinolone 0.1 % ointment    KENALOG    30 g    Apply sparingly to affected area three times daily for 14 days.

## 2017-06-07 NOTE — NURSING NOTE
"Chief Complaint   Patient presents with     Hypertension     initial /90 Estimated body mass index is 21.73 kg/(m^2) as calculated from the following:    Height as of 4/24/17: 5' 1\" (1.549 m).    Weight as of 4/24/17: 115 lb (52.2 kg).  BP completed using cuff size: regular.   R arm      Health Maintenance that is potentially due pending provider review:  NONE    n/a    Julián Hawkins ma  "

## 2017-06-08 LAB
DEPRECATED CALCIDIOL+CALCIFEROL SERPL-MC: 63 UG/L (ref 20–75)
VITAMIN D2 SERPL-MCNC: 11 UG/L
VITAMIN D3 SERPL-MCNC: 52 UG/L

## 2017-06-09 ENCOUNTER — SURGERY (OUTPATIENT)
Age: 57
End: 2017-06-09

## 2017-06-09 ENCOUNTER — HOSPITAL ENCOUNTER (OUTPATIENT)
Facility: CLINIC | Age: 57
Discharge: HOME | End: 2017-06-09
Attending: SURGERY | Admitting: SURGERY
Payer: COMMERCIAL

## 2017-06-09 ENCOUNTER — APPOINTMENT (OUTPATIENT)
Dept: SURGERY | Facility: PHYSICIAN GROUP | Age: 57
End: 2017-06-09
Payer: COMMERCIAL

## 2017-06-09 VITALS
BODY MASS INDEX: 21.71 KG/M2 | OXYGEN SATURATION: 96 % | DIASTOLIC BLOOD PRESSURE: 57 MMHG | HEIGHT: 62 IN | WEIGHT: 118 LBS | SYSTOLIC BLOOD PRESSURE: 97 MMHG | RESPIRATION RATE: 15 BRPM

## 2017-06-09 LAB
COLONOSCOPY: NORMAL
UPPER GI ENDOSCOPY: NORMAL

## 2017-06-09 PROCEDURE — 43239 EGD BIOPSY SINGLE/MULTIPLE: CPT | Performed by: SURGERY

## 2017-06-09 PROCEDURE — G0105 COLORECTAL SCRN; HI RISK IND: HCPCS | Performed by: SURGERY

## 2017-06-09 PROCEDURE — 45378 DIAGNOSTIC COLONOSCOPY: CPT | Performed by: SURGERY

## 2017-06-09 PROCEDURE — G0500 MOD SEDAT ENDO SERVICE >5YRS: HCPCS | Performed by: SURGERY

## 2017-06-09 PROCEDURE — 99153 MOD SED SAME PHYS/QHP EA: CPT

## 2017-06-09 PROCEDURE — 25000125 ZZHC RX 250: Performed by: SURGERY

## 2017-06-09 PROCEDURE — 88305 TISSUE EXAM BY PATHOLOGIST: CPT | Performed by: SURGERY

## 2017-06-09 PROCEDURE — 25000128 H RX IP 250 OP 636: Performed by: SURGERY

## 2017-06-09 PROCEDURE — 88305 TISSUE EXAM BY PATHOLOGIST: CPT | Mod: 26 | Performed by: SURGERY

## 2017-06-09 PROCEDURE — 25000132 ZZH RX MED GY IP 250 OP 250 PS 637: Performed by: SURGERY

## 2017-06-09 RX ORDER — FENTANYL CITRATE 50 UG/ML
INJECTION, SOLUTION INTRAMUSCULAR; INTRAVENOUS PRN
Status: DISCONTINUED | OUTPATIENT
Start: 2017-06-09 | End: 2017-06-09 | Stop reason: HOSPADM

## 2017-06-09 RX ADMIN — FENTANYL CITRATE 50 MCG: 50 INJECTION, SOLUTION INTRAMUSCULAR; INTRAVENOUS at 10:18

## 2017-06-09 RX ADMIN — MIDAZOLAM HYDROCHLORIDE 2 MG: 1 INJECTION, SOLUTION INTRAMUSCULAR; INTRAVENOUS at 10:14

## 2017-06-09 RX ADMIN — MIDAZOLAM HYDROCHLORIDE 1 MG: 1 INJECTION, SOLUTION INTRAMUSCULAR; INTRAVENOUS at 10:37

## 2017-06-09 RX ADMIN — MIDAZOLAM HYDROCHLORIDE 1 MG: 1 INJECTION, SOLUTION INTRAMUSCULAR; INTRAVENOUS at 10:17

## 2017-06-09 RX ADMIN — FENTANYL CITRATE 100 MCG: 50 INJECTION, SOLUTION INTRAMUSCULAR; INTRAVENOUS at 10:13

## 2017-06-09 RX ADMIN — BENZOCAINE 5 SPRAY: 220 SPRAY, METERED PERIODONTAL at 10:36

## 2017-06-09 RX ADMIN — FENTANYL CITRATE 50 MCG: 50 INJECTION, SOLUTION INTRAMUSCULAR; INTRAVENOUS at 10:40

## 2017-06-09 RX ADMIN — MIDAZOLAM HYDROCHLORIDE 1 MG: 1 INJECTION, SOLUTION INTRAMUSCULAR; INTRAVENOUS at 10:42

## 2017-06-09 NOTE — PROGRESS NOTES
Send lab & letter      -Vitamin D level is normal, 1000 IU daily in diet or supplements is recommended.     Please keep us posted with questions or concerns .      Best Regards,    Julieth Perez MD  [facility]  [phone]

## 2017-06-12 LAB — COPATH REPORT: NORMAL

## 2017-06-21 DIAGNOSIS — C50.912 BREAST CANCER, LEFT (H): ICD-10-CM

## 2017-06-23 ENCOUNTER — TELEPHONE (OUTPATIENT)
Dept: FAMILY MEDICINE | Facility: CLINIC | Age: 57
End: 2017-06-23

## 2017-06-23 DIAGNOSIS — I10 BENIGN ESSENTIAL HYPERTENSION: ICD-10-CM

## 2017-06-23 RX ORDER — LISINOPRIL 20 MG/1
20 TABLET ORAL DAILY
Qty: 30 TABLET | Refills: 1 | Status: SHIPPED | OUTPATIENT
Start: 2017-06-23 | End: 2017-07-06

## 2017-06-23 NOTE — TELEPHONE ENCOUNTER
Here for bp check,has been on the medication for 4 weeks bp is still a little high. What do you think?

## 2017-06-30 DIAGNOSIS — I10 BENIGN ESSENTIAL HYPERTENSION: ICD-10-CM

## 2017-06-30 RX ORDER — LISINOPRIL 10 MG/1
TABLET ORAL
Start: 2017-06-30

## 2017-06-30 NOTE — TELEPHONE ENCOUNTER
Lisinopril      Last Written Prescription Date: 6-23-17  Last Fill Quantity: 30, # refills: 1  Last Office Visit with G, P or [facility] prescribing provider: 4-24-17  Next 5 appointments (look out 90 days)     Jul 06, 2017  3:30 PM CDT   SHORT with Gomez العراقي MD   [facility] ([facility])    [address] [address] [phone]                   Potassium   Date Value Ref Range Status   06/07/2017 4.1 3.4 - 5.3 mmol/L Final     Creatinine   Date Value Ref Range Status   06/07/2017 0.65 0.52 - 1.04 mg/dL Final     BP Readings from Last 3 Encounters:   06/09/17 97/57   06/07/17 140/90   04/24/17 150/90

## 2017-06-30 NOTE — TELEPHONE ENCOUNTER
Pharmacy requesting Lisinopril 10mg   Denied  Dose was changed to 20mg daily on 6/23/2017; new Rx was sent  Radha ALVARES RN

## 2017-07-06 ENCOUNTER — OFFICE VISIT (OUTPATIENT)
Dept: FAMILY MEDICINE | Facility: CLINIC | Age: 57
End: 2017-07-06
Payer: COMMERCIAL

## 2017-07-06 VITALS
DIASTOLIC BLOOD PRESSURE: 72 MMHG | HEART RATE: 89 BPM | SYSTOLIC BLOOD PRESSURE: 116 MMHG | OXYGEN SATURATION: 100 % | HEIGHT: 62 IN | WEIGHT: 116.9 LBS | BODY MASS INDEX: 21.51 KG/M2

## 2017-07-06 DIAGNOSIS — Z11.3 SCREEN FOR STD (SEXUALLY TRANSMITTED DISEASE): Primary | ICD-10-CM

## 2017-07-06 DIAGNOSIS — K64.4 RESIDUAL HEMORRHOIDAL SKIN TAGS: ICD-10-CM

## 2017-07-06 DIAGNOSIS — I10 BENIGN ESSENTIAL HYPERTENSION: ICD-10-CM

## 2017-07-06 PROCEDURE — 87491 CHLMYD TRACH DNA AMP PROBE: CPT | Performed by: FAMILY MEDICINE

## 2017-07-06 PROCEDURE — 86695 HERPES SIMPLEX TYPE 1 TEST: CPT | Performed by: FAMILY MEDICINE

## 2017-07-06 PROCEDURE — 86696 HERPES SIMPLEX TYPE 2 TEST: CPT | Performed by: FAMILY MEDICINE

## 2017-07-06 PROCEDURE — 87591 N.GONORRHOEAE DNA AMP PROB: CPT | Performed by: FAMILY MEDICINE

## 2017-07-06 PROCEDURE — 87389 HIV-1 AG W/HIV-1&-2 AB AG IA: CPT | Performed by: FAMILY MEDICINE

## 2017-07-06 PROCEDURE — 36415 COLL VENOUS BLD VENIPUNCTURE: CPT | Performed by: FAMILY MEDICINE

## 2017-07-06 PROCEDURE — 99214 OFFICE O/P EST MOD 30 MIN: CPT | Performed by: FAMILY MEDICINE

## 2017-07-06 PROCEDURE — 86780 TREPONEMA PALLIDUM: CPT | Performed by: FAMILY MEDICINE

## 2017-07-06 RX ORDER — LISINOPRIL 20 MG/1
20 TABLET ORAL DAILY
Qty: 30 TABLET | Refills: 11 | Status: SHIPPED | OUTPATIENT
Start: 2017-07-06 | End: 2018-07-26

## 2017-07-06 NOTE — PROGRESS NOTES
"  SUBJECTIVE:                                                    Maryse Hanson is a 57 year old female who presents to clinic today for the following health issues:    Pt requesting STD screen, was in 2 new relationships this spring  Had unprotected intercourse in both relationships   denies any symptoms  Bilateral her current partner just recently had a full screening and she thinks this is a good idea    Also she had been having trouble with high blood pressure we started lisinopril and then increase to 20 mg and her blood pressure today looks much better    Also she has a skin tag from history of hemorrhoids in interested in a referral to get this removed     reports that she does not currently engage in sexual activity but has had male partners.;  History of:      Last antibiotic reported as: none    Current Outpatient Prescriptions   Medication     lisinopril (PRINIVIL/ZESTRIL) 20 MG tablet     cholecalciferol (VITAMIN D3) 33902 UNITS capsule     Beclomethasone Dipropionate (QNASL) 80 MCG/ACT AERS Nasal Spray     LORazepam (ATIVAN) 0.5 MG tablet     tamoxifen (NOLVADEX) 20 MG tablet     triamcinolone (KENALOG) 0.1 % ointment     Omega-3 Fatty Acids (OMEGA-3 FISH OIL PO)     aspirin 81 MG tablet     No current facility-administered medications for this visit.      I have reviewed the patient's medical history in detail; there are [facility]    ROS: As per HPI.  Constitutional: no fevers/sweats/chills, no weight changes  GI: no nausea/vomiting/diarrhea, normal stooling  GYN: no Vaginal discharge    EXAM  /72  Pulse 89  Ht 5' 2\" (1.575 m)  Wt 116 lb 14.4 oz (53 kg)  SpO2 100%  BMI 21.38 kg/m2  Gen: Healthy appearing female in no apparent distress  :vaginal exam deferred  Self vaginal swab    No results found for this or any previous visit (from the past 24 hour(s)).    ASSESSMENT/PLAN:  1. Benign essential hypertension  Stable refill medication for the next " year  - lisinopril (PRINIVIL/ZESTRIL) 20 MG tablet; Take 1 tablet (20 mg) by mouth daily  Dispense: 30 tablet; Refill: 11    2. Screen for STD (sexually transmitted disease)  Discussed the pros and cons of HSV serology screening  She has cold sores and positive type I is expected    - Neisseria gonorrhoeae PCR  - Chlamydia trachomatis PCR  - HIV Antigen Antibody Combo  - Anti Treponema  - Herpes Simplex Virus 1 and 2 IgG    3. Residual hemorrhoidal skin tags  Discussed referral  - COLORECTAL SURGERY REFERRAL      See instructions    [facility]

## 2017-07-06 NOTE — MR AVS SNAPSHOT
After Visit Summary   7/6/2017    Maryse Hanson    MRN: [id number]           Patient Information     Date Of Birth          1960        Visit Information        Provider Department      7/6/2017 3:30 PM Gomez العراقي MD [facility]        Today's Diagnoses     Screen for STD (sexually transmitted disease)    -  1    Benign essential hypertension        Residual hemorrhoidal skin tags           Follow-ups after your visit        Additional Services     COLORECTAL SURGERY REFERRAL       Your provider has referred you to: [facility]: [facility] [phone]   [email or web address]    Referral Reason(s): Hemorrhoids  Special Concerns:   This referral is: Elective (week +)  It is OK to leave a message on patient's voicemail.    Please be aware that coverage of these services is subject to the terms and limitations of your health insurance plan.  Call member services at your health plan with any benefit or coverage questions.      Please bring the following with you to your appointment:    (1) Any X-Rays, CTs or MRIs which have been performed.  Contact the facility where they were done to arrange for  prior to your scheduled appointment.    (2) List of current medications  (3) This referral request   (4) Any documents/labs given to you for this referral                  Your next 10 appointments already scheduled     Wes 10, 2018  9:15 AM CST   Return Visit with Vannessa Mejia MD   [facility] ([facility])    [facility]  [address] [facility] [address]  [facility] [address] [phone]              Who to contact     If you have questions or need follow up information about today's clinic visit or your schedule please contact [facility] directly at [phone].  Normal or non-critical lab and imaging results will be communicated to you by MyChart, letter or phone within 4  "business days after the clinic has received the results. If you do not hear from us within 7 days, please contact the clinic through [employer] or phone. If you have a critical or abnormal lab result, we will notify you by phone as soon as possible.  Submit refill requests through [employer] or call your pharmacy and they will forward the refill request to us. Please allow 3 business days for your refill to be completed.          Additional Information About Your Visit        Startcapps Information     [employer] lets you send messages to your doctor, view your test results, renew your prescriptions, schedule appointments and more. To sign up, go to [email or web address] . Click on \"Log in\" on the left side of the screen, which will take you to the Welcome page. Then click on \"Sign up Now\" on the right side of the page.     You will be asked to enter the access code listed below, as well as some personal information. Please follow the directions to create your username and password.     Your access code is: -[email or web address]  Expires: 2017 10:47 AM     Your access code will  in 90 days. If you need help or a new code, please call your [place] clinic or [phone].        Care EveryWhere ID     This is your Care EveryWhere ID. This could be used by other organizations to access your [place] medical records  [id number]        Your Vitals Were     Pulse Height Pulse Oximetry BMI (Body Mass Index)          89 5' 2\" (1.575 m) 100% 21.38 kg/m2         Blood Pressure from Last 3 Encounters:   17 116/72   17 97/57   17 140/90    Weight from Last 3 Encounters:   17 116 lb 14.4 oz (53 kg)   17 118 lb (53.5 kg)   17 115 lb (52.2 kg)              We Performed the Following     Anti Treponema     Chlamydia trachomatis PCR     COLORECTAL SURGERY REFERRAL     Herpes Simplex Virus 1 and 2 IgG     HIV Antigen Antibody Combo     Neisseria gonorrhoeae PCR          Where to get your medicines    "   These medications were sent to [id number] - [place], [place] - [address]  [address], [facility] [address]     Phone:  [phone]     lisinopril 20 MG tablet          Primary Care Provider Office Phone # Fax #    Gomez Jovany العراقي -[phone]       [facility] [address] [facility] [address]        Equal Access to Services     SALVATORE MCNEAL : Hadii aad ku hadasho Soomaali, waaxda luqadaha, qaybta kaalmada adeegyada, waxay idiin hayaan adeeg kharash la'crispin . So [facility] [phone].    ATENCIÓN: Si habla español, tiene a sellers disposición servicios gratuitos de asistencia lingüística. [facility] [phone].    We comply with applicable federal civil rights laws and [place] laws. We do not discriminate on the basis of race, color, national origin, age, disability sex, sexual orientation or gender identity.            Thank you!     Thank you for choosing [facility]  for your care. Our goal is always to provide you with excellent care. Hearing back from our patients is one way we can continue to improve our services. Please take a few minutes to complete the written survey that you may receive in the mail after your visit with us. Thank you!             Your Updated Medication List - Protect others around you: Learn how to safely use, store and throw away your medicines at www.disposemymeds.org.          This list is accurate as of: 7/6/17  4:08 PM.  Always use your most recent med list.                   Brand Name Dispense Instructions for use Diagnosis    aspirin 81 MG tablet      Take 1 tablet by mouth daily.        Beclomethasone Dipropionate 80 MCG/ACT Aers Nasal Spray    QNASL    8.7 g    Spray 2 sprays into both nostrils daily    Chronic rhinitis       cholecalciferol 19886 UNITS capsule    VITAMIN D3    24 capsule    Take 1 tablet twice weekly    Breast cancer, left (H)       lisinopril 20 MG tablet    PRINIVIL/ZESTRIL    30 tablet     Take 1 tablet (20 mg) by mouth daily    Benign essential hypertension       LORazepam 0.5 MG tablet    ATIVAN    10 tablet    Take 0.5-1 tablets (0.25-0.5 mg) by mouth every 8 hours as needed for anxiety Take 30 minutes prior to departure.  Do not operate a vehicle after taking this medication    Flying phobia       OMEGA-3 FISH OIL PO           tamoxifen 20 MG tablet    NOLVADEX    90 tablet    Take 1 tablet (20 mg) by mouth daily    Breast cancer (H)       triamcinolone 0.1 % ointment    KENALOG    30 g    Apply sparingly to affected area three times daily for 14 days.    Irritant contact dermatitis, unspecified trigger

## 2017-07-06 NOTE — NURSING NOTE
"Chief Complaint   Patient presents with     LAB REQUEST     std screen     /72  Pulse 89  Ht 5' 2\" (1.575 m)  Wt 116 lb 14.4 oz (53 kg)  SpO2 100%  BMI 21.38 kg/m2 Estimated body mass index is 21.38 kg/(m^2) as calculated from the following:    Height as of this encounter: 5' 2\" (1.575 m).    Weight as of this encounter: 116 lb 14.4 oz (53 kg).  Medication Reconciliation: complete      Health Maintenance due pending provider review:  NONE    n/a    Paulina Hurtado CMA  "

## 2017-07-07 LAB
HIV 1+2 AB+HIV1 P24 AG SERPL QL IA: NORMAL
HSV1 IGG SERPL QL IA: ABNORMAL AI (ref 0–0.8)
HSV2 IGG SERPL QL IA: ABNORMAL AI (ref 0–0.8)
T PALLIDUM IGG+IGM SER QL: NEGATIVE

## 2017-07-09 LAB
C TRACH DNA SPEC QL NAA+PROBE: NORMAL
N GONORRHOEA DNA SPEC QL NAA+PROBE: NORMAL
SPECIMEN SOURCE: NORMAL
SPECIMEN SOURCE: NORMAL

## 2017-09-01 ENCOUNTER — TRANSFERRED RECORDS (OUTPATIENT)
Dept: HEALTH INFORMATION MANAGEMENT | Facility: CLINIC | Age: 57
End: 2017-09-01

## 2017-10-30 ENCOUNTER — OFFICE VISIT (OUTPATIENT)
Dept: FAMILY MEDICINE | Facility: CLINIC | Age: 57
End: 2017-10-30
Payer: COMMERCIAL

## 2017-10-30 VITALS
WEIGHT: 120 LBS | HEART RATE: 57 BPM | RESPIRATION RATE: 14 BRPM | OXYGEN SATURATION: 100 % | HEIGHT: 62 IN | SYSTOLIC BLOOD PRESSURE: 116 MMHG | TEMPERATURE: 97.7 F | BODY MASS INDEX: 22.08 KG/M2 | DIASTOLIC BLOOD PRESSURE: 78 MMHG

## 2017-10-30 DIAGNOSIS — K64.4 RESIDUAL HEMORRHOIDAL SKIN TAGS: ICD-10-CM

## 2017-10-30 DIAGNOSIS — H93.13 TINNITUS, BILATERAL: ICD-10-CM

## 2017-10-30 DIAGNOSIS — Z01.818 PREOP GENERAL PHYSICAL EXAM: Primary | ICD-10-CM

## 2017-10-30 DIAGNOSIS — Z23 FLU VACCINE NEED: ICD-10-CM

## 2017-10-30 PROCEDURE — 90471 IMMUNIZATION ADMIN: CPT | Performed by: FAMILY MEDICINE

## 2017-10-30 PROCEDURE — 99214 OFFICE O/P EST MOD 30 MIN: CPT | Mod: 25 | Performed by: FAMILY MEDICINE

## 2017-10-30 PROCEDURE — 90686 IIV4 VACC NO PRSV 0.5 ML IM: CPT | Performed by: FAMILY MEDICINE

## 2017-10-30 NOTE — NURSING NOTE
"Chief Complaint   Patient presents with     Pre-Op Exam       Initial /78  Pulse 57  Temp 97.7  F (36.5  C) (Oral)  Resp 14  Ht 5' 2\" (1.575 m)  Wt 120 lb (54.4 kg)  SpO2 100%  Breastfeeding? No  BMI 21.95 kg/m2 Estimated body mass index is 21.95 kg/(m^2) as calculated from the following:    Height as of this encounter: 5' 2\" (1.575 m).    Weight as of this encounter: 120 lb (54.4 kg).  BP completed using cuff size: regular    Health Maintenance that is potentially due pending provider review:  Health Maintenance Due   Topic Date Due     HEPATITIS C SCREENING  06/11/1978     LIPID SCREEN Q5 YR FEMALE (SYSTEM ASSIGNED)  02/10/2015     ADVANCE DIRECTIVE PLANNING Q5 YRS  06/11/2015     INFLUENZA VACCINE (SYSTEM ASSIGNED)  09/01/2017         Flu vaccine today  Per provider  "

## 2017-10-30 NOTE — MR AVS SNAPSHOT
After Visit Summary   10/30/2017    Maryse Hanson    MRN: [id number]           Patient Information     Date Of Birth          1960        Visit Information        Provider Department      10/30/2017 4:00 PM Gomez العراقي MD [facility]        Today's Diagnoses     Preop general physical exam    -  1    Residual hemorrhoidal skin tags        Tinnitus, bilateral        Flu vaccine need          Care Instructions      Before Your Surgery      Call your surgeon if there is any change in your health. This includes signs of a cold or flu (such as a sore throat, runny nose, cough, rash or fever).    Do not smoke, drink alcohol or take over the counter medicine (unless your surgeon or primary care doctor tells you to) for the 24 hours before and after surgery.    If you take prescribed drugs: Follow your doctor s orders about which medicines to take and which to stop until after surgery.    Eating and drinking prior to surgery: follow the instructions from your surgeon    Take a shower or bath the night before surgery. Use the soap your surgeon gave you to gently clean your skin. If you do not have soap from your surgeon, use your regular soap. Do not shave or scrub the surgery site.  Wear clean pajamas and have clean sheets on your bed.           Follow-ups after your visit        Additional Services     AUDIOLOGY ADULT REFERRAL       Your provider has referred you to: [facility]: Audiology [facility] Re[facility] [phone]   [email or web address]    Specialty Testing:  Tinnitus Evaluation ([facility] Locations Only)                  Your next 10 appointments already scheduled     Nov 09, 2017   Procedure with Madina Augustin MD   [facility] PeriOP Services (--)    [address]  [facility] [address]   [phone]            Wes 10, 2018  9:15 AM CST   Return Visit with Vannessa Mejia  "[facility] ([facility])    n Medical Ctr [place]  [address]  [place] [address] [phone]              Who to contact     If you have questions or need follow up information about today's clinic visit or your schedule please contact [facility] directly at [phone].  Normal or non-critical lab and imaging results will be communicated to you by [employer], letter or phone within 4 business days after the clinic has received the results. If you do not hear from us within 7 days, please contact the clinic through [employer]t or phone. If you have a critical or abnormal lab result, we will notify you by phone as soon as possible.  Submit refill requests through [employer] or call your pharmacy and they will forward the refill request to us. Please allow 3 business days for your refill to be completed.          Additional Information About Your Visit        [employer] Information     [employer] gives you secure access to your electronic health record. If you see a primary care provider, you can also send messages to your care team and make appointments. If you have questions, please call your primary care clinic.  If you do not have a primary care provider, please call [phone] and they will assist you.        Care EveryWhere ID     This is your Care EveryWhere ID. This could be used by other organizations to access your [place] medical records  [id number]        Your Vitals Were     Pulse Temperature Respirations Height Pulse Oximetry Breastfeeding?    57 97.7  F (36.5  C) (Oral) 14 5' 2\" (1.575 m) 100% No    BMI (Body Mass Index)                   21.95 kg/m2            Blood Pressure from Last 3 Encounters:   10/30/17 116/78   07/06/17 116/72   06/09/17 97/57    Weight from Last 3 Encounters:   10/30/17 120 lb (54.4 kg)   07/06/17 116 lb 14.4 oz (53 kg)   06/09/17 118 lb (53.5 kg)              We Performed the Following     AUDIOLOGY ADULT " REFERRAL     HC FLU VAC PRESRV FREE QUAD SPLIT VIR 3+YRS IM        Primary Care Provider Office Phone # Fax #    Gomez Jovany العراقي -[phone] [address] [facility] [address]        Equal Access to Services     DIOJEREMI FREDIS : Hadii aad ku haddionicioo Soomaali, waaxda luqadaha, qaybta kaalmada adeegyada, waxstalin ramonin satyan adeshavonne rodriguez lasylviasarah perdue. So [facility] [phone].    ATENCIÓN: Si habla español, tiene a sellers disposición servicios gratuitos de asistencia lingüística. Llame al [phone].    We comply with applicable federal civil rights laws and [place] laws. We do not discriminate on the basis of race, color, national origin, age, disability, sex, sexual orientation, or gender identity.            Thank you!     Thank you for choosing [facility]  for your care. Our goal is always to provide you with excellent care. Hearing back from our patients is one way we can continue to improve our services. Please take a few minutes to complete the written survey that you may receive in the mail after your visit with us. Thank you!             Your Updated Medication List - Protect others around you: Learn how to safely use, store and throw away your medicines at www.disposemymeds.org.          This list is accurate as of: 10/30/17  4:35 PM.  Always use your most recent med list.                   Brand Name Dispense Instructions for use Diagnosis    aspirin 81 MG tablet      Take 1 tablet by mouth daily.        Beclomethasone Dipropionate 80 MCG/ACT Aers Nasal Spray    QNASL    8.7 g    Spray 2 sprays into both nostrils daily    Chronic rhinitis       cholecalciferol 24469 UNITS capsule    VITAMIN D3    24 capsule    Take 1 tablet twice weekly    Breast cancer, left (H)       lisinopril 20 MG tablet    PRINIVIL/ZESTRIL    30 tablet    Take 1 tablet (20 mg) by mouth daily    Benign essential hypertension       LORazepam 0.5 MG tablet    ATIVAN    10 tablet    Take 0.5-1  tablets (0.25-0.5 mg) by mouth every 8 hours as needed for anxiety Take 30 minutes prior to departure.  Do not operate a vehicle after taking this medication    Flying phobia       OMEGA-3 FISH OIL PO           tamoxifen 20 MG tablet    NOLVADEX    90 tablet    Take 1 tablet (20 mg) by mouth daily    Breast cancer (H)

## 2017-10-30 NOTE — PROGRESS NOTES
[facility]  [address]  [facility] [address] [phone]  Dept: [phone]    PRE-OP EVALUATION:  Today's date: 10/30/2017    Maryse Hanson (: 1960) presents for pre-operative evaluation assessment as requested by Dr. cline.  She requires evaluation and anesthesia risk assessment prior to undergoing surgery/procedure for treatment of skin tag and hemorrhoid .  Proposed procedure: skin tag and hemorrhoid removal    Date of Surgery/ Procedure: 17  Time of Surgery/ Procedure: 7:30 am  H[facility]Surgical Facility:  [facility]  Fax number for surgical facility:   Primary Physician: Gomez العراقي  Type of Anesthesia Anticipated: Local with MAC    Patient has a Health Care Directive or Living Will:  NO    Preop Questions 10/30/2017   1.  Do you have a history of heart attack, stroke, stent, bypass or surgery on an artery in the head, neck, heart or legs? No   2.  Do you ever have any pain or discomfort in your chest? No   3.  Do you have a history of  Heart Failure? No   4.   Are you troubled by shortness of breath when:  walking on a level surface, or up a slight hill, or at night? No   5.  Do you currently have a cold, bronchitis or other respiratory infection? No   6.  Do you have a cough, shortness of breath, or wheezing? No   7.  Do you sometimes get pains in the calves of your legs when you walk? No   8. Do you or anyone in your family have previous history of blood clots? No   9.  Do you or does anyone in your family have a serious bleeding problem such as prolonged bleeding following surgeries or cuts? No   10. Have you ever had problems with anemia or been told to take iron pills? No   11. Have you had any abnormal blood loss such as black, tarry or bloody stools, or abnormal vaginal bleeding? No   12. Have you ever had a blood transfusion? No   13. Have you or any of your relatives ever had problems with anesthesia? No   14. Do you have sleep  apnea, excessive snoring or daytime drowsiness? No   15. Do you have any prosthetic heart valves? No   16. Do you have prosthetic joints? No   17. Is there any chance that you may be pregnant? No         HPI:                                                      Brief HPI related to upcoming procedure: Long term skin tag, and hemorrhoid for many years, worsening        MEDICAL HISTORY:                                                    Patient Active Problem List    Diagnosis Date Noted     Post-nasal drip 04/24/2017     Priority: Medium     Flying phobia 04/24/2017     Priority: Medium     Benign essential hypertension 02/07/2017     Priority: Medium     wants to think it over   fup in 4 weeks for recheck- either nurse only or ROV for recheck       Breast cancer (H) 12/23/2006     Priority: Medium      Past Medical History:   Diagnosis Date     Cancer (H) 2007    Left Breast Cancer     Hypertension      Past Surgical History:   Procedure Laterality Date     COLONOSCOPY N/A 6/9/2017    Procedure: COLONOSCOPY;  colonoscopy / gastroscopy;  Surgeon: Trev Burks MD;  Location:  GI     complete hysterectomy      pap no longer needed     ESOPHAGOSCOPY, GASTROSCOPY, DUODENOSCOPY (EGD), COMBINED N/A 6/9/2017    Procedure: COMBINED ESOPHAGOSCOPY, GASTROSCOPY, DUODENOSCOPY (EGD), BIOPSY SINGLE OR MULTIPLE;;  Surgeon: Trev Burks MD;  Location:  GI     HYSTERECTOMY, PAP NO LONGER INDICATED      vaginal     MASTECTOMY MODIFIED RADICAL BILATERAL       MASTECTOMY, BILATERAL       Current Outpatient Prescriptions   Medication Sig Dispense Refill     lisinopril (PRINIVIL/ZESTRIL) 20 MG tablet Take 1 tablet (20 mg) by mouth daily 30 tablet 11     cholecalciferol (VITAMIN D3) 34675 UNITS capsule Take 1 tablet twice weekly 24 capsule 3     Beclomethasone Dipropionate (QNASL) 80 MCG/ACT AERS Nasal Spray Spray 2 sprays into both nostrils daily 8.7 g 11     LORazepam (ATIVAN) 0.5 MG tablet Take 0.5-1 tablets  "(0.25-0.5 mg) by mouth every 8 hours as needed for anxiety Take 30 minutes prior to departure.  Do not operate a vehicle after taking this medication 10 tablet 1     tamoxifen (NOLVADEX) 20 MG tablet Take 1 tablet (20 mg) by mouth daily 90 tablet 3     triamcinolone (KENALOG) 0.1 % ointment Apply sparingly to affected area three times daily for 14 days. 30 g 0     Omega-3 Fatty Acids (OMEGA-3 FISH OIL PO)        aspirin 81 MG tablet Take 1 tablet by mouth daily.       OTC products: None, except as noted above    Allergies   Allergen Reactions     Sulfa Drugs      Itching, swelling, irritation      Latex Allergy: NO    Social History   Substance Use Topics     Smoking status: Former Smoker     Smokeless tobacco: Never Used      Comment: 15 yrs ago     Alcohol use 0.0 oz/week     0 Standard drinks or equivalent per week      Comment: 4 drinks weekly     History   Drug Use No       REVIEW OF SYSTEMS:                                                    C: NEGATIVE for fever, chills, change in weight  I: NEGATIVE for worrisome rashes, moles or lesions  E: NEGATIVE for vision changes or irritation  E/M: NEGATIVE for ear, mouth and throat problems  R: NEGATIVE for significant cough or SOB  B: NEGATIVE for masses, tenderness or discharge  CV: NEGATIVE for chest pain, palpitations or peripheral edema  GI: NEGATIVE for nausea, abdominal pain, heartburn, or change in bowel habits  : NEGATIVE for frequency, dysuria, or hematuria  M: NEGATIVE for significant arthralgias or myalgia  N: NEGATIVE for weakness, dizziness or paresthesias  E: NEGATIVE for temperature intolerance, skin/hair changes  H: NEGATIVE for bleeding problems  P: NEGATIVE for changes in mood or affect    EXAM:                                                    Objective:  /78  Pulse 57  Temp 97.7  F (36.5  C) (Oral)  Resp 14  Ht 5' 2\" (1.575 m)  Wt 120 lb (54.4 kg)  SpO2 100%  Breastfeeding? No  BMI 21.95 kg/m2  General Appearance: Pleasant, " alert, in no acute respiratory distress.  Head Exam: Normal. Normocephalic, atraumatic. No sinus tenderness.  Eye Exam: Normal external eye, conjunctiva, lids. TONG.  Ear Exam: Normal auditory canals and external ears. Non-tender.  Left TM-normal. Right TM-normal.  OroPharynx Exam: Dental hygiene adequate. Normal buccal mucosa. Normal pharynx.  Neck Exam: Supple, no masses or enlarged, tender nodes.  Thyroid Exam: No nodules or enlargement or tenderness.  Chest/Respiratory Exam: Normal, comfortable, easy respirations. Chest wall normal. Lungs are clear to auscultation. No wheezing, crackles, or rhonchi.  Cardiovascular Exam: Regular rate and rhythm. No murmur, gallop, or rubs. No pedal edema.  Gastrointestinal Exam: Soft, non-tender, no masses or organomegaly.  Musculoskeletal Exam: Back is non-tender, full ROM of upper and lower extremities.  Skin: no rash, warm and dry.  Neurologic Exam: Nonfocal, no tremor. Normal gait.  Psychiatric Exam: Alert - appropriate, normal affect      DIAGNOSTICS:                                                    No labs or EKG required for low risk surgery (cataract, skin procedure, breast biopsy, etc)    Recent Labs   Lab Test  06/07/17   0834  12/28/16   1253  07/22/16   1012   HGB   --   14.2  14.5   PLT   --   225  217   NA  138  137   --    POTASSIUM  4.1  4.0   --    CR  0.65  0.87   --         IMPRESSION:                                                    Reason for surgery/procedure: Hemorrhoid  Diagnosis/reason for consult: Routine     The proposed surgical procedure is considered LOW risk.    REVISED CARDIAC RISK INDEX  The patient has the following serious cardiovascular risks for perioperative complications such as (MI, PE, VFib and 3  AV Block):  No serious cardiac risks  INTERPRETATION: 0 risks: Class I (very low risk - 0.4% complication rate)    The patient has the following additional risks for perioperative complications:  No identified additional risks      ICD-10-CM     1. Preop general physical exam Z01.818    2. Residual hemorrhoidal skin tags K64.4           Flu vaccine need Z23 HC FLU VAC PRESRV FREE QUAD SPLIT VIR 3+YRS IM       RECOMMENDATIONS:                                                        Cardiovascular Risk  Performs 4 METs exercise without symptoms (Climb a flight of stairs) .       --Patient is to take all scheduled medications on the day of surgery    APPROVAL GIVEN to proceed with proposed procedure, without further diagnostic evaluation       Signed Electronically by: Gomez العراقي MD    Copy of this evaluation report is provided to requesting physician.    [facility] Preop Guidelines

## 2017-11-06 NOTE — H&P (VIEW-ONLY)
[facility]  [address]  [facility] [address] [phone]  Dept: [phone]    PRE-OP EVALUATION:  Today's date: 10/30/2017    Maryse Hanson (: 1960) presents for pre-operative evaluation assessment as requested by Dr. cline.  She requires evaluation and anesthesia risk assessment prior to undergoing surgery/procedure for treatment of skin tag and hemorrhoid .  Proposed procedure: skin tag and hemorrhoid removal    Date of Surgery/ Procedure: 17  Time of Surgery/ Procedure: 7:30 am  Hospital[facility]Surgical Facility:  [facility]  Fax number for surgical facility:   Primary Physician: Gomez العراقي  Type of Anesthesia Anticipated: Local with MAC    Patient has a Health Care Directive or Living Will:  NO    Preop Questions 10/30/2017   1.  Do you have a history of heart attack, stroke, stent, bypass or surgery on an artery in the head, neck, heart or legs? No   2.  Do you ever have any pain or discomfort in your chest? No   3.  Do you have a history of  Heart Failure? No   4.   Are you troubled by shortness of breath when:  walking on a level surface, or up a slight hill, or at night? No   5.  Do you currently have a cold, bronchitis or other respiratory infection? No   6.  Do you have a cough, shortness of breath, or wheezing? No   7.  Do you sometimes get pains in the calves of your legs when you walk? No   8. Do you or anyone in your family have previous history of blood clots? No   9.  Do you or does anyone in your family have a serious bleeding problem such as prolonged bleeding following surgeries or cuts? No   10. Have you ever had problems with anemia or been told to take iron pills? No   11. Have you had any abnormal blood loss such as black, tarry or bloody stools, or abnormal vaginal bleeding? No   12. Have you ever had a blood transfusion? No   13. Have you or any of your relatives ever had problems with anesthesia? No   14. Do you have sleep  apnea, excessive snoring or daytime drowsiness? No   15. Do you have any prosthetic heart valves? No   16. Do you have prosthetic joints? No   17. Is there any chance that you may be pregnant? No         HPI:                                                      Brief HPI related to upcoming procedure: Long term skin tag, and hemorrhoid for many years, worsening        MEDICAL HISTORY:                                                    Patient Active Problem List    Diagnosis Date Noted     Post-nasal drip 04/24/2017     Priority: Medium     Flying phobia 04/24/2017     Priority: Medium     Benign essential hypertension 02/07/2017     Priority: Medium     wants to think it over   fup in 4 weeks for recheck- either nurse only or ROV for recheck       Breast cancer (H) 12/23/2006     Priority: Medium      Past Medical History:   Diagnosis Date     Cancer (H) 2007    Left Breast Cancer     Hypertension      Past Surgical History:   Procedure Laterality Date     COLONOSCOPY N/A 6/9/2017    Procedure: COLONOSCOPY;  colonoscopy / gastroscopy;  Surgeon: Trev Burks MD;  Location:  GI     complete hysterectomy      pap no longer needed     ESOPHAGOSCOPY, GASTROSCOPY, DUODENOSCOPY (EGD), COMBINED N/A 6/9/2017    Procedure: COMBINED ESOPHAGOSCOPY, GASTROSCOPY, DUODENOSCOPY (EGD), BIOPSY SINGLE OR MULTIPLE;;  Surgeon: Trev Burks MD;  Location:  GI     HYSTERECTOMY, PAP NO LONGER INDICATED      vaginal     MASTECTOMY MODIFIED RADICAL BILATERAL       MASTECTOMY, BILATERAL       Current Outpatient Prescriptions   Medication Sig Dispense Refill     lisinopril (PRINIVIL/ZESTRIL) 20 MG tablet Take 1 tablet (20 mg) by mouth daily 30 tablet 11     cholecalciferol (VITAMIN D3) 39943 UNITS capsule Take 1 tablet twice weekly 24 capsule 3     Beclomethasone Dipropionate (QNASL) 80 MCG/ACT AERS Nasal Spray Spray 2 sprays into both nostrils daily 8.7 g 11     LORazepam (ATIVAN) 0.5 MG tablet Take 0.5-1 tablets  "(0.25-0.5 mg) by mouth every 8 hours as needed for anxiety Take 30 minutes prior to departure.  Do not operate a vehicle after taking this medication 10 tablet 1     tamoxifen (NOLVADEX) 20 MG tablet Take 1 tablet (20 mg) by mouth daily 90 tablet 3     triamcinolone (KENALOG) 0.1 % ointment Apply sparingly to affected area three times daily for 14 days. 30 g 0     Omega-3 Fatty Acids (OMEGA-3 FISH OIL PO)        aspirin 81 MG tablet Take 1 tablet by mouth daily.       OTC products: None, except as noted above    Allergies   Allergen Reactions     Sulfa Drugs      Itching, swelling, irritation      Latex Allergy: NO    Social History   Substance Use Topics     Smoking status: Former Smoker     Smokeless tobacco: Never Used      Comment: 15 yrs ago     Alcohol use 0.0 oz/week     0 Standard drinks or equivalent per week      Comment: 4 drinks weekly     History   Drug Use No       REVIEW OF SYSTEMS:                                                    C: NEGATIVE for fever, chills, change in weight  I: NEGATIVE for worrisome rashes, moles or lesions  E: NEGATIVE for vision changes or irritation  E/M: NEGATIVE for ear, mouth and throat problems  R: NEGATIVE for significant cough or SOB  B: NEGATIVE for masses, tenderness or discharge  CV: NEGATIVE for chest pain, palpitations or peripheral edema  GI: NEGATIVE for nausea, abdominal pain, heartburn, or change in bowel habits  : NEGATIVE for frequency, dysuria, or hematuria  M: NEGATIVE for significant arthralgias or myalgia  N: NEGATIVE for weakness, dizziness or paresthesias  E: NEGATIVE for temperature intolerance, skin/hair changes  H: NEGATIVE for bleeding problems  P: NEGATIVE for changes in mood or affect    EXAM:                                                    Objective:  /78  Pulse 57  Temp 97.7  F (36.5  C) (Oral)  Resp 14  Ht 5' 2\" (1.575 m)  Wt 120 lb (54.4 kg)  SpO2 100%  Breastfeeding? No  BMI 21.95 kg/m2  General Appearance: Pleasant, " alert, in no acute respiratory distress.  Head Exam: Normal. Normocephalic, atraumatic. No sinus tenderness.  Eye Exam: Normal external eye, conjunctiva, lids. TONG.  Ear Exam: Normal auditory canals and external ears. Non-tender.  Left TM-normal. Right TM-normal.  OroPharynx Exam: Dental hygiene adequate. Normal buccal mucosa. Normal pharynx.  Neck Exam: Supple, no masses or enlarged, tender nodes.  Thyroid Exam: No nodules or enlargement or tenderness.  Chest/Respiratory Exam: Normal, comfortable, easy respirations. Chest wall normal. Lungs are clear to auscultation. No wheezing, crackles, or rhonchi.  Cardiovascular Exam: Regular rate and rhythm. No murmur, gallop, or rubs. No pedal edema.  Gastrointestinal Exam: Soft, non-tender, no masses or organomegaly.  Musculoskeletal Exam: Back is non-tender, full ROM of upper and lower extremities.  Skin: no rash, warm and dry.  Neurologic Exam: Nonfocal, no tremor. Normal gait.  Psychiatric Exam: Alert - appropriate, normal affect      DIAGNOSTICS:                                                    No labs or EKG required for low risk surgery (cataract, skin procedure, breast biopsy, etc)    Recent Labs   Lab Test  06/07/17   0834  12/28/16   1253  07/22/16   1012   HGB   --   14.2  14.5   PLT   --   225  217   NA  138  137   --    POTASSIUM  4.1  4.0   --    CR  0.65  0.87   --         IMPRESSION:                                                    Reason for surgery/procedure: Hemorrhoid  Diagnosis/reason for consult: Routine     The proposed surgical procedure is considered LOW risk.    REVISED CARDIAC RISK INDEX  The patient has the following serious cardiovascular risks for perioperative complications such as (MI, PE, VFib and 3  AV Block):  No serious cardiac risks  INTERPRETATION: 0 risks: Class I (very low risk - 0.4% complication rate)    The patient has the following additional risks for perioperative complications:  No identified additional risks      ICD-10-CM     1. Preop general physical exam Z01.818    2. Residual hemorrhoidal skin tags K64.4           Flu vaccine need Z23 HC FLU VAC PRESRV FREE QUAD SPLIT VIR 3+YRS IM       RECOMMENDATIONS:                                                        Cardiovascular Risk  Performs 4 METs exercise without symptoms (Climb a flight of stairs) .       --Patient is to take all scheduled medications on the day of surgery    APPROVAL GIVEN to proceed with proposed procedure, without further diagnostic evaluation       Signed Electronically by: Gomez العراقي MD    Copy of this evaluation report is provided to requesting physician.    [facility] Preop Guidelines

## 2017-11-09 ENCOUNTER — HOSPITAL ENCOUNTER (OUTPATIENT)
Facility: CLINIC | Age: 57
Discharge: HOME | End: 2017-11-09
Attending: COLON & RECTAL SURGERY | Admitting: COLON & RECTAL SURGERY
Payer: COMMERCIAL

## 2017-11-09 ENCOUNTER — ANESTHESIA (OUTPATIENT)
Dept: SURGERY | Facility: CLINIC | Age: 57
End: 2017-11-09
Payer: COMMERCIAL

## 2017-11-09 ENCOUNTER — ANESTHESIA EVENT (OUTPATIENT)
Dept: SURGERY | Facility: CLINIC | Age: 57
End: 2017-11-09
Payer: COMMERCIAL

## 2017-11-09 ENCOUNTER — TRANSFERRED RECORDS (OUTPATIENT)
Dept: HEALTH INFORMATION MANAGEMENT | Facility: CLINIC | Age: 57
End: 2017-11-09

## 2017-11-09 VITALS
WEIGHT: 117.56 LBS | TEMPERATURE: 97.3 F | RESPIRATION RATE: 18 BRPM | OXYGEN SATURATION: 99 % | DIASTOLIC BLOOD PRESSURE: 64 MMHG | SYSTOLIC BLOOD PRESSURE: 101 MMHG | BODY MASS INDEX: 21.5 KG/M2

## 2017-11-09 DIAGNOSIS — K64.9 HEMORRHOIDS, UNSPECIFIED HEMORRHOID TYPE: Primary | ICD-10-CM

## 2017-11-09 PROCEDURE — 36000052 ZZH SURGERY LEVEL 2 EA 15 ADDTL MIN: Performed by: COLON & RECTAL SURGERY

## 2017-11-09 PROCEDURE — 36000050 ZZH SURGERY LEVEL 2 1ST 30 MIN: Performed by: COLON & RECTAL SURGERY

## 2017-11-09 PROCEDURE — 71000012 ZZH RECOVERY PHASE 1 LEVEL 1 FIRST HR: Performed by: COLON & RECTAL SURGERY

## 2017-11-09 PROCEDURE — 25000132 ZZH RX MED GY IP 250 OP 250 PS 637: Performed by: COLON & RECTAL SURGERY

## 2017-11-09 PROCEDURE — S0020 INJECTION, BUPIVICAINE HYDRO: HCPCS | Performed by: COLON & RECTAL SURGERY

## 2017-11-09 PROCEDURE — 71000027 ZZH RECOVERY PHASE 2 EACH 15 MINS: Performed by: COLON & RECTAL SURGERY

## 2017-11-09 PROCEDURE — 25000125 ZZHC RX 250: Performed by: COLON & RECTAL SURGERY

## 2017-11-09 PROCEDURE — 25000125 ZZHC RX 250: Performed by: NURSE ANESTHETIST, CERTIFIED REGISTERED

## 2017-11-09 PROCEDURE — 88304 TISSUE EXAM BY PATHOLOGIST: CPT | Mod: 26 | Performed by: COLON & RECTAL SURGERY

## 2017-11-09 PROCEDURE — 37000008 ZZH ANESTHESIA TECHNICAL FEE, 1ST 30 MIN: Performed by: COLON & RECTAL SURGERY

## 2017-11-09 PROCEDURE — 40000170 ZZH STATISTIC PRE-PROCEDURE ASSESSMENT II: Performed by: COLON & RECTAL SURGERY

## 2017-11-09 PROCEDURE — 88304 TISSUE EXAM BY PATHOLOGIST: CPT | Performed by: COLON & RECTAL SURGERY

## 2017-11-09 PROCEDURE — 37000009 ZZH ANESTHESIA TECHNICAL FEE, EACH ADDTL 15 MIN: Performed by: COLON & RECTAL SURGERY

## 2017-11-09 PROCEDURE — 25000128 H RX IP 250 OP 636: Performed by: COLON & RECTAL SURGERY

## 2017-11-09 PROCEDURE — 25000128 H RX IP 250 OP 636: Performed by: NURSE ANESTHETIST, CERTIFIED REGISTERED

## 2017-11-09 PROCEDURE — 27210794 ZZH OR GENERAL SUPPLY STERILE: Performed by: COLON & RECTAL SURGERY

## 2017-11-09 RX ORDER — FENTANYL CITRATE 50 UG/ML
INJECTION, SOLUTION INTRAMUSCULAR; INTRAVENOUS PRN
Status: DISCONTINUED | OUTPATIENT
Start: 2017-11-09 | End: 2017-11-09

## 2017-11-09 RX ORDER — DEXAMETHASONE SODIUM PHOSPHATE 4 MG/ML
INJECTION, SOLUTION INTRA-ARTICULAR; INTRALESIONAL; INTRAMUSCULAR; INTRAVENOUS; SOFT TISSUE PRN
Status: DISCONTINUED | OUTPATIENT
Start: 2017-11-09 | End: 2017-11-09

## 2017-11-09 RX ORDER — PROPOFOL 10 MG/ML
INJECTION, EMULSION INTRAVENOUS CONTINUOUS PRN
Status: DISCONTINUED | OUTPATIENT
Start: 2017-11-09 | End: 2017-11-09

## 2017-11-09 RX ORDER — ONDANSETRON 4 MG/1
4 TABLET, ORALLY DISINTEGRATING ORAL EVERY 30 MIN PRN
Status: DISCONTINUED | OUTPATIENT
Start: 2017-11-09 | End: 2017-11-09 | Stop reason: HOSPADM

## 2017-11-09 RX ORDER — SODIUM CHLORIDE, SODIUM LACTATE, POTASSIUM CHLORIDE, CALCIUM CHLORIDE 600; 310; 30; 20 MG/100ML; MG/100ML; MG/100ML; MG/100ML
INJECTION, SOLUTION INTRAVENOUS CONTINUOUS
Status: DISCONTINUED | OUTPATIENT
Start: 2017-11-09 | End: 2017-11-09 | Stop reason: HOSPADM

## 2017-11-09 RX ORDER — LIDOCAINE HYDROCHLORIDE 20 MG/ML
INJECTION, SOLUTION INFILTRATION; PERINEURAL PRN
Status: DISCONTINUED | OUTPATIENT
Start: 2017-11-09 | End: 2017-11-09

## 2017-11-09 RX ORDER — NALOXONE HYDROCHLORIDE 0.4 MG/ML
.1-.4 INJECTION, SOLUTION INTRAMUSCULAR; INTRAVENOUS; SUBCUTANEOUS
Status: DISCONTINUED | OUTPATIENT
Start: 2017-11-09 | End: 2017-11-09 | Stop reason: HOSPADM

## 2017-11-09 RX ORDER — HYDROCODONE BITARTRATE AND ACETAMINOPHEN 5; 325 MG/1; MG/1
1-2 TABLET ORAL EVERY 4 HOURS PRN
Qty: 20 TABLET | Refills: 0 | Status: SHIPPED | OUTPATIENT
Start: 2017-11-09 | End: 2018-01-31

## 2017-11-09 RX ORDER — ONDANSETRON 2 MG/ML
INJECTION INTRAMUSCULAR; INTRAVENOUS PRN
Status: DISCONTINUED | OUTPATIENT
Start: 2017-11-09 | End: 2017-11-09

## 2017-11-09 RX ORDER — FENTANYL CITRATE 0.05 MG/ML
25-50 INJECTION, SOLUTION INTRAMUSCULAR; INTRAVENOUS
Status: DISCONTINUED | OUTPATIENT
Start: 2017-11-09 | End: 2017-11-09 | Stop reason: HOSPADM

## 2017-11-09 RX ORDER — ONDANSETRON 2 MG/ML
4 INJECTION INTRAMUSCULAR; INTRAVENOUS EVERY 30 MIN PRN
Status: DISCONTINUED | OUTPATIENT
Start: 2017-11-09 | End: 2017-11-09 | Stop reason: HOSPADM

## 2017-11-09 RX ORDER — HYDROMORPHONE HYDROCHLORIDE 1 MG/ML
.3-.5 INJECTION, SOLUTION INTRAMUSCULAR; INTRAVENOUS; SUBCUTANEOUS EVERY 10 MIN PRN
Status: DISCONTINUED | OUTPATIENT
Start: 2017-11-09 | End: 2017-11-09 | Stop reason: HOSPADM

## 2017-11-09 RX ORDER — MEPERIDINE HYDROCHLORIDE 25 MG/ML
12.5 INJECTION INTRAMUSCULAR; INTRAVENOUS; SUBCUTANEOUS
Status: DISCONTINUED | OUTPATIENT
Start: 2017-11-09 | End: 2017-11-09 | Stop reason: HOSPADM

## 2017-11-09 RX ORDER — SODIUM CHLORIDE, SODIUM LACTATE, POTASSIUM CHLORIDE, CALCIUM CHLORIDE 600; 310; 30; 20 MG/100ML; MG/100ML; MG/100ML; MG/100ML
INJECTION, SOLUTION INTRAVENOUS CONTINUOUS PRN
Status: DISCONTINUED | OUTPATIENT
Start: 2017-11-09 | End: 2017-11-09

## 2017-11-09 RX ORDER — ACETAMINOPHEN 325 MG/1
975 TABLET ORAL ONCE
Status: COMPLETED | OUTPATIENT
Start: 2017-11-09 | End: 2017-11-09

## 2017-11-09 RX ADMIN — PROPOFOL 75 MCG/KG/MIN: 10 INJECTION, EMULSION INTRAVENOUS at 07:31

## 2017-11-09 RX ADMIN — SODIUM CHLORIDE, POTASSIUM CHLORIDE, SODIUM LACTATE AND CALCIUM CHLORIDE: 600; 310; 30; 20 INJECTION, SOLUTION INTRAVENOUS at 07:28

## 2017-11-09 RX ADMIN — MIDAZOLAM HYDROCHLORIDE 2 MG: 1 INJECTION, SOLUTION INTRAMUSCULAR; INTRAVENOUS at 07:31

## 2017-11-09 RX ADMIN — FENTANYL CITRATE 100 MCG: 50 INJECTION, SOLUTION INTRAMUSCULAR; INTRAVENOUS at 07:31

## 2017-11-09 RX ADMIN — DEXAMETHASONE SODIUM PHOSPHATE 4 MG: 4 INJECTION, SOLUTION INTRA-ARTICULAR; INTRALESIONAL; INTRAMUSCULAR; INTRAVENOUS; SOFT TISSUE at 07:35

## 2017-11-09 RX ADMIN — LIDOCAINE HYDROCHLORIDE 40 MG: 20 INJECTION, SOLUTION INFILTRATION; PERINEURAL at 07:31

## 2017-11-09 RX ADMIN — ONDANSETRON 4 MG: 2 INJECTION INTRAMUSCULAR; INTRAVENOUS at 07:35

## 2017-11-09 RX ADMIN — ACETAMINOPHEN 975 MG: 325 TABLET, FILM COATED ORAL at 06:46

## 2017-11-09 ASSESSMENT — ENCOUNTER SYMPTOMS: SEIZURES: 0

## 2017-11-09 ASSESSMENT — COPD QUESTIONNAIRES: COPD: 0

## 2017-11-09 NOTE — DISCHARGE INSTRUCTIONS
While you were at the hospital today you were given 975 mg of Tylenol. The recommended daily maximum dose is 4000 mg.     Same Day Surgery Discharge Instructions for  Sedation and General Anesthesia       It's not unusual to feel dizzy, light-headed or faint for up to 24 hours after surgery or while taking pain medication.  If you have these symptoms: sit for a few minutes before standing and have someone assist you when you get up to walk or use the bathroom.      You should rest and relax for the next 24 hours. We recommend you make arrangements to have an adult stay with you for at least 24 hours after your discharge.  Avoid hazardous and strenuous activity.      DO NOT DRIVE any vehicle or operate mechanical equipment for 24 hours following the end of your surgery.  Even though you may feel normal, your reactions may be affected by the medication you have received.      Do not drink alcoholic beverages for 24 hours following surgery.       Slowly progress to your regular diet as you feel able. It's not unusual to feel nauseated and/or vomit after receiving anesthesia.  If you develop these symptoms, drink clear liquids (apple juice, ginger ale, broth, 7-up, etc. ) until you feel better.  If your nausea and vomiting persists for 24 hours, please notify your surgeon.        All narcotic pain medications, along with inactivity and anesthesia, can cause constipation. Drinking plenty of liquids and increasing fiber intake will help.      For any questions of a medical nature, call your surgeon.      Do not make important decisions for 24 hours.      If you had general anesthesia, you may have a sore throat for a couple of days related to the breathing tube used during surgery.  You may use Cepacol lozenges to help with this discomfort.  If it worsens or if you develop a fever, contact your surgeon.       If you feel your pain is not well managed with the pain medications prescribed by your surgeon, please contact  your surgeon's office to let them know so they can address your concerns.     **If you have questions or concerns about your procedure,  call Dr. Augustin at [phone]**    Discharge Instructions Following Anorectal Surgery  [facility]  [phone]  Medication:     You may be prescribed a narcotic pain relievers such as: Vicodin, Percocet, and Tylenol # 3.  You should reduce your use of these medications as your pain improves.  You may be prescribed an anal ointment (such as Americain, Tronothane, or Xylocaine). Apply the ointment to the anal area with your finger, then cover the area with cotton or gauze.  Stool softeners (Miralax) are to be taken in a glass of water once in the morning with increased water intake throughout the day.   Bowel function:   It is important to have soft bowel movements at least every other day and to keep your stool soft. Constipation and/or diarrhea can make the pain worse and must be avoided.   Constipation:  You should have a bowel movement at least every other day.  If two days pass without one, take one tablespoon of milk of magnesia; if there is no result, repeat this dose in six hours.   Diarrhea:  Diarrhea, usually caused by the overuse of laxatives, is also a concern. If you have more than three watery bowel movements during a 24 hour period, stop taking milk of magnesia or laxatives.  If diarrhea persists, call your doctor.   Bathing:  After bowel movements, use a wet washcloth, toilet paper or cotton to clean yourself.  If possible take a sitz bath or tub bath immediately. Baths should last between 10-15 minutes with the water as warm as you can comfortably tolerate. Try to take at least three sitz baths (or showers with hand-held sprayer) every day.   Discharge/Infection:  Bloody discharge after bowel movements is normal and may last 2 to 4 weeks after your surgery. However, if you have bleeding between bowel movements that doesn't stop, call the  office.  Urination:  If you have trouble urinating, do so while sitting in a tub of water, or run the water faucet while sitting on the toilet. If you are unable to urinate 6-8 hours after surgery, call the office.  Diet:  A high fiber diet, including at least four servings of fruits or vegetables daily, will help to keep your bowel movements regular and soft. It is important to drink six to eight glasses of water or juice everyday when using fiber products.   Activity:    Activity as tolerated. After some surgeries, you may be told not to perform any lifting (more than 10 pounds) for several weeks after surgery.    Call the office if you have:  Fever greater than 101   Chills   Foul-smelling drainage   Nausea and vomiting   Diarrhea - greater than 3 water stools in 24 hours   Constipation - no bowel movement for 3 days   Severe bleeding that does not stop soon after a bowel movement   Problems with the incision, including increased pain, swelling, redness, or drainage.  Difficulty urinating

## 2017-11-09 NOTE — ANESTHESIA CARE TRANSFER NOTE
Patient: Maryse Hanson    Procedure(s):  HEMORRHOIDECTOMY EXTERNAL - Wound Class: II-Clean Contaminated    Diagnosis: HEMORRHOIDS  Diagnosis Additional Information: No value filed.    Anesthesia Type:   MAC     Note:  Airway :Room Air  Patient transferred to:PACU  Comments: To [facility]ixon Report: Identifed the Patient, Identified the Reponsible Provider, Reviewed the pertinent medical history, Discussed the surgical course, Reviewed Intra-OP anesthesia mangement and issues during anesthesia, Set expectations for post-procedure period and Allowed opportunity for questions and acknowledgement of understanding      Vitals: (Last set prior to Anesthesia Care Transfer)    CRNA VITALS  11/9/2017 0730 - 11/9/2017 0805      11/9/2017             Resp Rate (set): 10                Electronically Signed By: AZRA Villa CRNA  November 9, 2017  8:05 AM

## 2017-11-09 NOTE — ANESTHESIA PREPROCEDURE EVALUATION
Procedure: Procedure(s):  HEMORRHOIDECTOMY EXTERNAL  Preop diagnosis: HEMORRHOIDS    Allergies   Allergen Reactions     Sulfa Drugs      Itching, swelling, irritation     Past Medical History:   Diagnosis Date     Cancer (H) 2007    Left Breast Cancer     Hypertension      Past Surgical History:   Procedure Laterality Date     CHOLECYSTECTOMY       COLONOSCOPY N/A 6/9/2017    Procedure: COLONOSCOPY;  colonoscopy / gastroscopy;  Surgeon: Trev Burks MD;  Location:  GI     complete hysterectomy      pap no longer needed     ESOPHAGOSCOPY, GASTROSCOPY, DUODENOSCOPY (EGD), COMBINED N/A 6/9/2017    Procedure: COMBINED ESOPHAGOSCOPY, GASTROSCOPY, DUODENOSCOPY (EGD), BIOPSY SINGLE OR MULTIPLE;;  Surgeon: Trev Burks MD;  Location:  GI     HYSTERECTOMY, PAP NO LONGER INDICATED      vaginal     MASTECTOMY MODIFIED RADICAL BILATERAL       MASTECTOMY, BILATERAL       Social History   Substance Use Topics     Smoking status: Former Smoker     Quit date: 11/9/1993     Smokeless tobacco: Never Used      Comment: 15 yrs ago     Alcohol use 0.0 oz/week     0 Standard drinks or equivalent per week      Comment: 4-6  drinks weekly     Prior to Admission medications    Medication Sig Start Date End Date Taking? Authorizing Provider   lisinopril (PRINIVIL/ZESTRIL) 20 MG tablet Take 1 tablet (20 mg) by mouth daily 7/6/17  Yes Gomez العراقي MD   Beclomethasone Dipropionate (QNASL) 80 MCG/ACT AERS Nasal Spray Spray 2 sprays into both nostrils daily 4/24/17  Yes Gomez العراقي MD   LORazepam (ATIVAN) 0.5 MG tablet Take 0.5-1 tablets (0.25-0.5 mg) by mouth every 8 hours as needed for anxiety Take 30 minutes prior to departure.  Do not operate a vehicle after taking this medication 4/24/17  Yes Gomez العراقي MD   tamoxifen (NOLVADEX) 20 MG tablet Take 1 tablet (20 mg) by mouth daily 3/31/17  Yes Vannessa Mejia MD   cholecalciferol (VITAMIN D3) 04764 UNITS capsule Take 1 tablet twice  weekly 6/21/17   Vannessa Mejia MD   aspirin 81 MG tablet Take 1 tablet by mouth daily. 2/17/11   Vannessa Mejia MD     Current Facility-Administered Medications Ordered in Epic   Medication Dose Route Frequency Last Rate Last Dose     PRE OP antibiotics NOT needed for this surgical procedure  1 each As instructed Continuous         No current [facility]-ordered outpatient prescriptions on file.       no pre procedure antibiotic needed       Wt Readings from Last 1 Encounters:   11/09/17 53.3 kg (117 lb 9 oz)     Temp Readings from Last 1 Encounters:   11/09/17 36.7  C (98.1  F) (Oral)     BP Readings from Last 6 Encounters:   11/09/17 132/81   10/30/17 116/78   07/06/17 116/72   06/09/17 97/57   06/07/17 140/90   04/24/17 150/90     Pulse Readings from Last 4 Encounters:   10/30/17 57   07/06/17 89   04/24/17 73   02/07/17 86     Resp Readings from Last 1 Encounters:   11/09/17 16     SpO2 Readings from Last 1 Encounters:   11/09/17 99%     Recent Labs   Lab Test  06/07/17   0834  12/28/16   1253   NA  138  137   POTASSIUM  4.1  4.0   CHLORIDE  105  101   CO2  27  30   ANIONGAP  6  6   GLC  62*  91   BUN  10  16   CR  0.65  0.87   DANNIE  8.9  9.7     Recent Labs   Lab Test  12/28/16   1253  12/30/15   1502   AST  30  23   ALT  37  39   ALKPHOS  64  58   BILITOTAL  0.5  0.4     Recent Labs   Lab Test  12/28/16   1253  07/22/16   1012   WBC  8.5  4.3   HGB  14.2  14.5   PLT  225  217         Anesthesia Evaluation     . Pt has had prior anesthetic.     No history of anesthetic complications          ROS/MED HX    ENT/Pulmonary:      (-) asthma and COPD   Neurologic:      (-) seizures and CVA   Cardiovascular:     (+) hypertension----. : . . . :. .       METS/Exercise Tolerance:     Hematologic:         Musculoskeletal:         GI/Hepatic:        (-) GERD and liver disease   Renal/Genitourinary:         Endo:         Psychiatric:         Infectious Disease:         Malignancy:   (+) Malignancy History of Breast          Other:                      Physical Exam  Normal systems: dental    Airway   Mallampati: II  TM distance: >3 FB  Neck ROM: full    Dental     Cardiovascular   Rhythm and rate: regular      Pulmonary    breath sounds clear to auscultation                    Anesthesia Plan      History & Physical Review  History and physical reviewed and following examination; no interval change.    ASA Status:  2 .    NPO Status:  > 8 hours    Plan for MAC   PONV prophylaxis:  Ondansetron (or other 5HT-3) and Dexamethasone or Solumedrol       Postoperative Care      Consents  Anesthetic plan, risks, benefits and alternatives discussed with:  Patient..                          .

## 2017-11-09 NOTE — BRIEF OP NOTE
[facility] Brief Operative Note    Pre-operative diagnosis: HEMORRHOIDS   Post-operative diagnosis mixed hemorrhoid     Procedure: Procedure(s):  HEMORRHOIDECTOMY EXTERNAL - Wound Class: II-Clean Contaminated   Surgeon(s): Surgeon(s) and Role:     * Madina Augustin MD - Primary   Estimated blood loss: 1 mL    Specimens:   ID Type Source Tests Collected by Time Destination   A : HEMORRHOID Tissue Hemorrhoid SURGICAL PATHOLOGY EXAM Madina Augustin MD 11/9/2017  7:53 AM       Findings: Left posterior lateral mixed hemorrhoid

## 2017-11-09 NOTE — ANESTHESIA POSTPROCEDURE EVALUATION
Patient: Maryse Hanson    Procedure(s):  HEMORRHOIDECTOMY EXTERNAL - Wound Class: II-Clean Contaminated    Diagnosis:HEMORRHOIDS  Diagnosis Additional Information: No value filed.    Anesthesia Type:  MAC    Note:  Anesthesia Post Evaluation    Patient location during evaluation: PACU  Patient participation: Able to fully participate in evaluation  Level of consciousness: awake and alert  Pain management: satisfactory to patient  Airway patency: patent  Cardiovascular status: hemodynamically stable  Respiratory status: acceptable and unassisted  Hydration status: balanced  PONV: none     Anesthetic complications: None          Last vitals:  Vitals:    11/09/17 0830 11/09/17 0845 11/09/17 0915   BP: 92/52 99/63 101/64   Resp: 16 20 18   Temp: 36.4  C (97.5  F) 36.3  C (97.3  F)    SpO2: 97% 98% 99%         Electronically Signed By: Wild Galindo MD  November 9, 2017  5:45 PM

## 2017-11-09 NOTE — OP NOTE
DATE OF PROCEDURE:  11/9/2017      PREOPERATIVE DIAGNOSIS:  Mixed hemorrhoids.      POSTOPERATIVE DIAGNOSIS:  Mixed hemorrhoids.      PROCEDURE:  One-quadrant hemorrhoidectomy.      SURGEON:  Colin Carlos MD      ASSISTANT:  None.      ANESTHESIA:  MAC.      INDICATIONS:  Maryse Hanson is a 57-year-old woman who for years has noticed an external hemorrhoid.  This has been mildly symptomatic.  It has become more troublesome in recent years.  When examined in the office, there was evidence of an internal hemorrhoid component.  Therefore, the options of surgical removal of both the internal and external component were discussed with the patient.  The indications, procedure and risks were reviewed.  She understands and wishes to proceed.      PROCEDURE:  After an enema prep, the patient was brought to the operating room.  She was placed in the prone jackknife position.  After receiving adequate sedation, her buttocks were taped apart for exposure.  The area was prepped and draped in the usual sterile manner.  A timeout was performed, and everyone present in the operating room agreed to the planned procedure.  A perianal nerve block using a mixture of Marcaine, lidocaine and sodium bicarbonate was then performed.  When she was comfortable, a George bivalve anoscope was introduced.  Other than the left posterior quadrant, no abnormalities were identified.  An elliptical incision was made from the external component of the hemorrhoid extending up around to the level of the anorectal ring.  The underlying hemorrhoids were removed.  Hemostasis was achieved with the cautery.  Proctoplasty was performed with a continuous 3-0 chromic suture.  Hemostasis was again checked for and felt to be adequate.  A pad was placed over the anal canal.  All sponge, blade and needle counts were reported as correct x2.  The patient was transferred to the recovery room in stable condition.         COLIN CARLOS MD             D: 11/09/2017 08:35    T: 2017 09:23   MT: EM#[id number]      Name:     DENISSE LACEY   MRN:      [id number]        Account:        [id number]   :      1960           Procedure Date: 2017      Document: [id number]       cc: Gomez العراقي MD

## 2017-11-09 NOTE — IP AVS SNAPSHOT
MRN:[id number]                      After Visit Summary   11/9/2017    Maryse Hanson    MRN: [id number]           Thank you!     Thank you for choosing [facility] for your care. Our goal is always to provide you with excellent care. Hearing back from our patients is one way we can continue to improve our services. Please take a few minutes to complete the written survey that you may receive in the mail after you visit with us. Thank you!        Patient Information     Date Of Birth          1960        About your hospital stay     You were admitted on:  November 9, 2017 You last received care in the:  [facility] PACU    You were discharged on:  November 9, 2017       Who to Call     For medical emergencies, please call 911.  For non-urgent questions about your medical care, please call your primary care provider or clinic, [phone]  For questions related to your surgery, please call your surgery clinic        Attending Provider     Provider Specialty    Madina Augustin MD Colon and Rectal Surgery       Primary Care Provider Office Phone # Fax #    Gomez Jovany العراقي [phone]      After Care Instructions     Diet Instructions       Resume pre-procedure diet            Discharge Instructions       Follow up appointment as instructed by Surgeon and or RN                  Your next 10 appointments already scheduled     Wes 10, 2018  9:15 AM CST   Return Visit with Vannessa Mejia MD   [facility] ([facility])    [facility]  [address]  [facility] [id number] [phone]              Further instructions from your care team       While you were at the hospital today you were given 975 mg of Tylenol. The recommended daily maximum dose is 4000 mg.     Same Day Surgery Discharge Instructions for  Sedation and General Anesthesia       It's not unusual to feel dizzy, light-headed or faint for up to  24 hours after surgery or while taking pain medication.  If you have these symptoms: sit for a few minutes before standing and have someone assist you when you get up to walk or use the bathroom.      You should rest and relax for the next 24 hours. We recommend you make arrangements to have an adult stay with you for at least 24 hours after your discharge.  Avoid hazardous and strenuous activity.      DO NOT DRIVE any vehicle or operate mechanical equipment for 24 hours following the end of your surgery.  Even though you may feel normal, your reactions may be affected by the medication you have received.      Do not drink alcoholic beverages for 24 hours following surgery.       Slowly progress to your regular diet as you feel able. It's not unusual to feel nauseated and/or vomit after receiving anesthesia.  If you develop these symptoms, drink clear liquids (apple juice, ginger ale, broth, 7-up, etc. ) until you feel better.  If your nausea and vomiting persists for 24 hours, please notify your surgeon.        All narcotic pain medications, along with inactivity and anesthesia, can cause constipation. Drinking plenty of liquids and increasing fiber intake will help.      For any questions of a medical nature, call your surgeon.      Do not make important decisions for 24 hours.      If you had general anesthesia, you may have a sore throat for a couple of days related to the breathing tube used during surgery.  You may use Cepacol lozenges to help with this discomfort.  If it worsens or if you develop a fever, contact your surgeon.       If you feel your pain is not well managed with the pain medications prescribed by your surgeon, please contact your surgeon's office to let them know so they can address your concerns.     **If you have questions or concerns about your procedure,  call Dr. Augustin at [phone]**    Discharge Instructions Following Anorectal Surgery  [facility]  [phone]  Medication:     You may be prescribed a narcotic pain relievers such as: Vicodin, Percocet, and Tylenol # 3.  You should reduce your use of these medications as your pain improves.  You may be prescribed an anal ointment (such as Americain, Tronothane, or Xylocaine). Apply the ointment to the anal area with your finger, then cover the area with cotton or gauze.  Stool softeners (Miralax) are to be taken in a glass of water once in the morning with increased water intake throughout the day.   Bowel function:   It is important to have soft bowel movements at least every other day and to keep your stool soft. Constipation and/or diarrhea can make the pain worse and must be avoided.   Constipation:  You should have a bowel movement at least every other day.  If two days pass without one, take one tablespoon of milk of magnesia; if there is no result, repeat this dose in six hours.   Diarrhea:  Diarrhea, usually caused by the overuse of laxatives, is also a concern. If you have more than three watery bowel movements during a 24 hour period, stop taking milk of magnesia or laxatives.  If diarrhea persists, call your doctor.   Bathing:  After bowel movements, use a wet washcloth, toilet paper or cotton to clean yourself.  If possible take a sitz bath or tub bath immediately. Baths should last between 10-15 minutes with the water as warm as you can comfortably tolerate. Try to take at least three sitz baths (or showers with hand-held sprayer) every day.   Discharge/Infection:  Bloody discharge after bowel movements is normal and may last 2 to 4 weeks after your surgery. However, if you have bleeding between bowel movements that doesn't stop, call the office.  Urination:  If you have trouble urinating, do so while sitting in a tub of water, or run the water faucet while sitting on the toilet. If you are unable to urinate 6-8 hours after surgery, call the office.  Diet:  A high fiber diet, including  at least four servings of fruits or vegetables daily, will help to keep your bowel movements regular and soft. It is important to drink six to eight glasses of water or juice everyday when using fiber products.   Activity:    Activity as tolerated. After some surgeries, you may be told not to perform any lifting (more than 10 pounds) for several weeks after surgery.    Call the office if you have:  Fever greater than 101   Chills   Foul-smelling drainage   Nausea and vomiting   Diarrhea - greater than 3 water stools in 24 hours   Constipation - no bowel movement for 3 days   Severe bleeding that does not stop soon after a bowel movement   Problems with the incision, including increased pain, swelling, redness, or drainage.  Difficulty urinating                      Pending Results     No orders found from 11/7/2017 to 11/10/2017.            Admission Information     Date & Time Provider Department Dept. Phone    11/9/2017 Madina Augustin MD [facility] PACU [phone]      Your Vitals Were     Blood Pressure Temperature Respirations Weight Pulse Oximetry BMI (Body Mass Index)    105/68 97.6  F (36.4  C) 15 53.3 kg (117 lb 9 oz) 97% 21.5 kg/m2      MyChart Information     [employer] gives you secure access to your electronic health record. If you see a primary care provider, you can also send messages to your care team and make appointments. If you have questions, please call your primary care clinic.  If you do not have a primary care provider, please call [phone] and they will assist you.        Care EveryWhere ID     This is your Care EveryWhere ID. This could be used by other organizations to access your [facility] medical records  [id number]        Equal Access to Services     JEREMI MCNEAL : Aadm Chavez, wasejal cho, qaybta kaeric lowe. So [facility] [phone].    ATENCIÓN: Si habla español, tiene a sellers disposición servicios  mook de asistencia lingüística. Ney mueller [phone].    We comply with applicable federal civil rights laws and [place] laws. We do not discriminate on the basis of race, color, national origin, age, disability, sex, sexual orientation, or gender identity.               Review of your medicines      START taking        Dose / Directions    HYDROcodone-acetaminophen 5-325 MG per tablet   Commonly known as:  NORCO   Used for:  Hemorrhoids, unspecified hemorrhoid type        Dose:  1-2 tablet   Take 1-2 tablets by mouth every 4 hours as needed for other (Moderate to Severe Pain)   Quantity:  20 tablet   Refills:  0         CONTINUE these medicines which have NOT CHANGED        Dose / Directions    aspirin 81 MG tablet        Dose:  1 tablet   Take 1 tablet by mouth daily.   Refills:  0       Beclomethasone Dipropionate 80 MCG/ACT Aers Nasal Spray   Commonly known as:  QNASL   Used for:  Chronic rhinitis        Dose:  2 spray   Spray 2 sprays into both nostrils daily   Quantity:  8.7 g   Refills:  11       cholecalciferol 14200 UNITS capsule   Commonly known as:  VITAMIN D3   Used for:  Breast cancer, left (H)        Take 1 tablet twice weekly   Quantity:  24 capsule   Refills:  3       lisinopril 20 MG tablet   Commonly known as:  PRINIVIL/ZESTRIL   Used for:  Benign essential hypertension        Dose:  20 mg   Take 1 tablet (20 mg) by mouth daily   Quantity:  30 tablet   Refills:  11       LORazepam 0.5 MG tablet   Commonly known as:  ATIVAN   Used for:  Flying phobia        Dose:  0.25-0.5 mg   Take 0.5-1 tablets (0.25-0.5 mg) by mouth every 8 hours as needed for anxiety Take 30 minutes prior to departure.  Do not operate a vehicle after taking this medication   Quantity:  10 tablet   Refills:  1       tamoxifen 20 MG tablet   Commonly known as:  NOLVADEX   Used for:  Breast cancer (H)        Dose:  20 mg   Take 1 tablet (20 mg) by mouth daily   Quantity:  90 tablet   Refills:  3            Where to get your  medicines      Some of these will need a paper prescription and others can be bought over the counter. Ask your nurse if you have questions.     Bring a paper prescription for each of these medications     HYDROcodone-acetaminophen 5-325 MG per tablet                Protect others around you: Learn how to safely use, store and throw away your medicines at www.disposemymeds.org.             Medication List: This is a list of all your medications and when to take them. Check marks below indicate your daily home schedule. Keep this list as a reference.      Medications           Morning Afternoon Evening Bedtime As Needed    aspirin 81 MG tablet   Take 1 tablet by mouth daily.                                Beclomethasone Dipropionate 80 MCG/ACT Aers Nasal Spray   Commonly known as:  QNASL   Spray 2 sprays into both nostrils daily                                cholecalciferol 57809 UNITS capsule   Commonly known as:  VITAMIN D3   Take 1 tablet twice weekly                                HYDROcodone-acetaminophen 5-325 MG per tablet   Commonly known as:  NORCO   Take 1-2 tablets by mouth every 4 hours as needed for other (Moderate to Severe Pain)                                lisinopril 20 MG tablet   Commonly known as:  PRINIVIL/ZESTRIL   Take 1 tablet (20 mg) by mouth daily                                LORazepam 0.5 MG tablet   Commonly known as:  ATIVAN   Take 0.5-1 tablets (0.25-0.5 mg) by mouth every 8 hours as needed for anxiety Take 30 minutes prior to departure.  Do not operate a vehicle after taking this medication                                tamoxifen 20 MG tablet   Commonly known as:  NOLVADEX   Take 1 tablet (20 mg) by mouth daily

## 2017-11-09 NOTE — IP AVS SNAPSHOT
[facility] [address]    [place] [address]    Phone:  [phone]                                       After Visit Summary   11/9/2017    Maryse Hanson    MRN: [id number]           After Visit Summary Signature Page     I have received my discharge instructions, and my questions have been answered. I have discussed any challenges I see with this plan with the nurse or doctor.    ..........................................................................................................................................  Patient/Patient Representative Signature      ..........................................................................................................................................  Patient Representative Print Name and Relationship to Patient    ..................................................               ................................................  Date                                            Time    ..........................................................................................................................................  Reviewed by Signature/Title    ...................................................              ..............................................  Date                                                            Time

## 2017-11-10 LAB — COPATH REPORT: NORMAL

## 2017-12-08 ENCOUNTER — TRANSFERRED RECORDS (OUTPATIENT)
Dept: HEALTH INFORMATION MANAGEMENT | Facility: CLINIC | Age: 57
End: 2017-12-08

## 2018-01-16 DIAGNOSIS — Z85.3 PERSONAL HISTORY OF MALIGNANT NEOPLASM OF BREAST: ICD-10-CM

## 2018-01-16 LAB
BASOPHILS # BLD AUTO: 0.1 10E9/L (ref 0–0.2)
BASOPHILS NFR BLD AUTO: 1.3 %
CANCER AG27-29 SERPL-ACNC: 29 U/ML (ref 0–39)
DIFFERENTIAL METHOD BLD: NORMAL
EOSINOPHIL # BLD AUTO: 0.1 10E9/L (ref 0–0.7)
EOSINOPHIL NFR BLD AUTO: 1.5 %
ERYTHROCYTE [DISTWIDTH] IN BLOOD BY AUTOMATED COUNT: 14.5 % (ref 10–15)
HCT VFR BLD AUTO: 41.5 % (ref 35–47)
HGB BLD-MCNC: 13.8 G/DL (ref 11.7–15.7)
LYMPHOCYTES # BLD AUTO: 1.7 10E9/L (ref 0.8–5.3)
LYMPHOCYTES NFR BLD AUTO: 36.4 %
MCH RBC QN AUTO: 31.6 PG (ref 26.5–33)
MCHC RBC AUTO-ENTMCNC: 33.3 G/DL (ref 31.5–36.5)
MCV RBC AUTO: 95 FL (ref 78–100)
MONOCYTES # BLD AUTO: 0.5 10E9/L (ref 0–1.3)
MONOCYTES NFR BLD AUTO: 11.8 %
NEUTROPHILS # BLD AUTO: 2.2 10E9/L (ref 1.6–8.3)
NEUTROPHILS NFR BLD AUTO: 49 %
PLATELET # BLD AUTO: 212 10E9/L (ref 150–450)
RBC # BLD AUTO: 4.37 10E12/L (ref 3.8–5.2)
WBC # BLD AUTO: 4.6 10E9/L (ref 4–11)

## 2018-01-16 PROCEDURE — 85025 COMPLETE CBC W/AUTO DIFF WBC: CPT | Performed by: INTERNAL MEDICINE

## 2018-01-16 PROCEDURE — 36415 COLL VENOUS BLD VENIPUNCTURE: CPT | Performed by: INTERNAL MEDICINE

## 2018-01-16 PROCEDURE — 80076 HEPATIC FUNCTION PANEL: CPT | Performed by: INTERNAL MEDICINE

## 2018-01-16 PROCEDURE — 86300 IMMUNOASSAY TUMOR CA 15-3: CPT | Performed by: INTERNAL MEDICINE

## 2018-01-17 LAB
ALBUMIN SERPL-MCNC: 4.1 G/DL (ref 3.4–5)
ALP SERPL-CCNC: 56 U/L (ref 40–150)
ALT SERPL W P-5'-P-CCNC: 28 U/L (ref 0–50)
AST SERPL W P-5'-P-CCNC: 28 U/L (ref 0–45)
BILIRUB DIRECT SERPL-MCNC: 0.1 MG/DL (ref 0–0.2)
BILIRUB SERPL-MCNC: 0.5 MG/DL (ref 0.2–1.3)
PROT SERPL-MCNC: 7.1 G/DL (ref 6.8–8.8)

## 2018-01-31 ENCOUNTER — ONCOLOGY VISIT (OUTPATIENT)
Dept: ONCOLOGY | Facility: CLINIC | Age: 58
End: 2018-01-31
Attending: INTERNAL MEDICINE
Payer: COMMERCIAL

## 2018-01-31 VITALS
OXYGEN SATURATION: 99 % | WEIGHT: 121.8 LBS | SYSTOLIC BLOOD PRESSURE: 125 MMHG | HEART RATE: 71 BPM | RESPIRATION RATE: 16 BRPM | TEMPERATURE: 98.2 F | BODY MASS INDEX: 22.28 KG/M2 | DIASTOLIC BLOOD PRESSURE: 78 MMHG

## 2018-01-31 DIAGNOSIS — Z80.0 FAMILY HISTORY OF COLON CANCER: ICD-10-CM

## 2018-01-31 DIAGNOSIS — R25.2 CRAMP OF LIMB: ICD-10-CM

## 2018-01-31 DIAGNOSIS — Z85.3 PERSONAL HISTORY OF MALIGNANT NEOPLASM OF BREAST: Primary | ICD-10-CM

## 2018-01-31 PROCEDURE — G0463 HOSPITAL OUTPT CLINIC VISIT: HCPCS

## 2018-01-31 PROCEDURE — 99214 OFFICE O/P EST MOD 30 MIN: CPT | Performed by: INTERNAL MEDICINE

## 2018-01-31 ASSESSMENT — PAIN SCALES - GENERAL: PAINLEVEL: NO PAIN (0)

## 2018-01-31 NOTE — LETTER
"    1/31/2018         RE: Maryse Hanson  [address]  [facility] [address]        Dear Colleague,    Thank you for referring your patient, Maryse Hanson, to the [facility]. Please see a copy of my visit note below.    Oncology Rooming Note    January 31, 2018 8:58 AM   Maryse Hanson is a 57 year old female who presents for:    Chief Complaint   Patient presents with     Oncology Clinic Visit     breast cancer     Initial Vitals: /78 (BP Location: Right arm, Patient Position: Sitting, Cuff Size: Adult Regular)  Pulse 71  Temp 98.2  F (36.8  C) (Oral)  Resp 16  Wt 55.2 kg (121 lb 12.8 oz)  SpO2 99%  BMI 22.28 kg/m2 Estimated body mass index is 22.28 kg/(m^2) as calculated from the following:    Height as of 10/30/17: 1.575 m (5' 2\").    Weight as of this encounter: 55.2 kg (121 lb 12.8 oz). Body surface area is 1.55 meters squared.  No Pain (0) Comment: Data Unavailable   No LMP recorded. Patient has had a hysterectomy.  Allergies reviewed: Yes  Medications reviewed: Yes    Medications:   MEDICATION REFILL NEEDED ON TAMOXIFEN AND VITAMIN D3  Pharmacy name entered into [facility]:    [facility][id number] - [place], [place] - [address]    Clinical concerns: None                       4 minutes for nursing intake (face to face time)     Letitia Cohen MA              CHIEF COMPLAINT AND REASON FOR VISIT: left breast cancer, s/p  Arimidex, now on tamoxifen since 12/2013.     HISTORY OF PRESENT ILLNESS: She was diagnosed with left breast cancer, infiltrating ductal cancer, grade 3, ER/[place] positive, HER-2 negative, 8/14 positive nodes, modified left radical mastectomy 03/2006, status post dose-dense Adriamycin and cyclophosphamide followed by Taxol after mastectomy, finished 08/2006. Finished radiation 10/2006.   Arimidex was started in 09/2006. She was not willing to be off it. It was continued to 2013 when 10 yrs tamoxifen data came out. It was changed to tamoxifen in " 12/2013.    Maryse Hanson had a prophylactic right mastectomy in 12/2009 and immediate reconstruction. The left side is a TRAM flap and the right side is an implant.   Final pathology from the right breast indicating proliferative fibrocystic changes, radial scar, sclerosing adenosis, sclerosed fibroadenoma.     PAST MEDICAL HISTORY: HTN on ACEin, external hemorrhoidectomy    MEDICATIONS/ALLERGIES: reviewed in [facility]    OB/GYN HISTORY: Hysterectomy, bilateral salpingo-oophorectomy in 04/2007    FAMILY HISTORY: Strong family history of colon cancer, maternal side. She was tested negative  for BRCA1 and BRCA2.     SOCIAL HISTORY: She is .  She works in [employer]. She and daughter moved to [place]. Her twin boys are in college.     REVIEW OF SYSTEMS: Weight has been stable. She is very upbeat, taking vitamin D. No breast complaints. Occasional leg cramp.   Started on HTN by her PMD in [facility]    PHYSICAL EXAMINATION:   VITAL SIGNS: Blood pressure 125/78, pulse 71, temperature 98.2  F (36.8  C), temperature source Oral, resp. rate 16, weight 55.2 kg (121 lb 12.8 oz), SpO2 99 %, not currently breastfeeding.  GENERAL APPEARANCE: A young woman, looks like her stated age, not in acute distress.   BREASTS: Bilateral implant on left side is a TRAM flap and the right side is implant. She also got her bilateral nipple redone.   HEENT: The patient is normocephalic, atraumatic. Pupils are equal react to light. Sclerae are anicteric. Moist oral mucosa. Negative pharynx. No oral thrush.   NECK: Supple. No jugular venous distention. Thyroid is not palpable.   LYMPH NODES: Superficial lymphadenopathy is not appreciable in the bilateral cervical, supraclavicular, axillary or inguinal adenopathy.   CARDIOVASCULAR: S1, S2 regular with no murmurs or gallops. No carotid or abdominal bruits.   PULMONARY: Lungs are clear to auscultation and percussion bilaterally. There is no wheezing or rhonchi.   GASTROINTESTINAL: Abdomen is  soft, nontender. No hepatosplenomegaly. No signs of ascites. No mass appreciable.   MUSCULOSKELETAL/EXTREMITIES: No edema. No cyanotic changes. No signs of joint deformity. No lymphedema.   NEUROLOGIC: Cranial nerves II-XII are grossly intact. Sensation intact. Muscle strength and muscle tone symmetrical all through 5/5.   BACK: No spinal or paraspinal tenderness. No CVA tenderness.   SKIN: No petechiae. No rash. No signs of cellulitis.     CURRENT LAB DATA:   WBC, Marker, liver panel are satisfactory    OLD DATA REVIEWED TODAY WITH SUMMARY  Dexa 3/2017 - nl.   CXR/BONE SCAN 10/2013 - negative.  PET scan 07/2011 was negative for any malignancy. PET scan in summer of 2010 was negative for malignancy.     ASSESSMENT AND PLAN:   1. Early stage high risk breast cancer 2006 was on 7 yrs of Arimidex.  It was continued to 2013 when 10 yrs tamoxifen data came out. It was changed to tamoxifen in 12/2013.    We talked about the side effects from tamoxifen and how she should be monitored.   We talked about the recurrence pattern associated with ER + breast cancer and how we follow them up long term.    She is a good candidate for 10 yrs of tamoxifen.    She is on yrly follow up plan.     We talked about the OCP risk for her daughter's breast cancer.     2. Leg cramp from tomoxifen. Advise avoiding dehydration and Mag/K intake. She feels it is helping.     3. FH of colon cancer. She has been on colonoscopy q 3 yrs and she is f/u with Dr. Augustin.        Again, thank you for allowing me to participate in the care of your patient.        Sincerely,        Vannessa Mejia MD, MD

## 2018-01-31 NOTE — PROGRESS NOTES
"Oncology Rooming Note    January 31, 2018 8:58 AM   Maryse Hanson is a 57 year old female who presents for:    Chief Complaint   Patient presents with     Oncology Clinic Visit     breast cancer     Initial Vitals: /78 (BP Location: Right arm, Patient Position: Sitting, Cuff Size: Adult Regular)  Pulse 71  Temp 98.2  F (36.8  C) (Oral)  Resp 16  Wt 55.2 kg (121 lb 12.8 oz)  SpO2 99%  BMI 22.28 kg/m2 Estimated body mass index is 22.28 kg/(m^2) as calculated from the following:    Height as of 10/30/17: 1.575 m (5' 2\").    Weight as of this encounter: 55.2 kg (121 lb 12.8 oz). Body surface area is 1.55 meters squared.  No Pain (0) Comment: Data Unavailable   No LMP recorded. Patient has had a hysterectomy.  Allergies reviewed: Yes  Medications reviewed: Yes    Medications:   MEDICATION REFILL NEEDED ON TAMOXIFEN AND VITAMIN D3  Pharmacy name entered into [facility]:    [facility] #[id number] - [place], [place] - [address]    Clinical concerns: None                       4 minutes for nursing intake (face to face time)     Letitia Cohen MA            "

## 2018-01-31 NOTE — MR AVS SNAPSHOT
After Visit Summary   1/31/2018    Maryse Hanson    MRN: [id number]           Patient Information     Date Of Birth          1960        Visit Information        Provider Department      1/31/2018 8:45 AM Vannessa Mejia MD [facility]        Today's Diagnoses     Personal history of malignant neoplasm of breast    -  1    Cramp of limb        Family history of colon cancer          Care Instructions    1 yr f/u with labs.          Follow-ups after your visit        Your next 10 appointments already scheduled     Jan 14, 2019  8:30 AM CST   Return Visit with  Oncology Nurse   [facility] ([facility])    [facility]  [address]  [place] [address]   [phone]            Jan 30, 2019  8:30 AM CST   Return Visit with Vannessa Mejia MD   [facility] ([facility])    [facility]  [address]  [place] [address]   [phone]              Future tests that were ordered for you today     Open Future Orders        Priority Expected Expires Ordered    CBC with platelets differential Routine 1/1/2019 1/31/2019 1/31/2018    Comprehensive metabolic panel Routine 1/1/2019 1/31/2019 1/31/2018    Ca27.29  breast tumor marker Routine 1/1/2019 1/31/2019 1/31/2018            Who to contact     If you have questions or need follow up information about today's clinic visit or your schedule please contact [facility] directly at [phone].  Normal or non-critical lab and imaging results will be communicated to you by MyChart, letter or phone within 4 business days after the clinic has received the results. If you do not hear from us within 7 days, please contact the clinic through [employer] or phone. If you have a critical or abnormal lab result, we will notify you by phone as soon as possible.  Submit refill requests through [employer] or call your pharmacy and they  will forward the refill request to us. Please allow 3 business days for your refill to be completed.          Additional Information About Your Visit        [employer] Information     [employer] gives you secure access to your electronic health record. If you see a primary care provider, you can also send messages to your care team and make appointments. If you have questions, please call your primary care clinic.  If you do not have a primary care provider, please call [phone] and they will assist you.        Care EveryWhere ID     This is your Care EveryWhere ID. This could be used by other organizations to access your [facility] medical records  [id number]        Your Vitals Were     Pulse Temperature Respirations Pulse Oximetry BMI (Body Mass Index)       71 98.2  F (36.8  C) (Oral) 16 99% 22.28 kg/m2        Blood Pressure from Last 3 Encounters:   01/31/18 125/78   11/09/17 101/64   10/30/17 116/78    Weight from Last 3 Encounters:   01/31/18 55.2 kg (121 lb 12.8 oz)   11/09/17 53.3 kg (117 lb 9 oz)   10/30/17 54.4 kg (120 lb)                 Today's Medication Changes          These changes are accurate as of 1/31/18  9:41 AM.  If you have any questions, ask your nurse or doctor.               Stop taking these medicines if you haven't already. Please contact your care team if you have questions.     HYDROcodone-acetaminophen 5-325 MG per tablet   Commonly known as:  NORCO                    Primary Care Provider Office Phone # Fax #    Gomez Jovany العراقي [phone] [address] [facility] [address]        Equal Access to Services     [facility]: Hadii merritt dickerson hadasho Sograciela, waaxda luqadaha, qaybta kaalmada eric curiel. So [facility] [phone].    ATENCIÓN: Si habla español, tiene a sellers disposición servicios gratuitos de asistencia lingüística. Llame al [phone].    We comply with applicable federal civil rights laws and  [place] laws. We do not discriminate on the basis of race, color, national origin, age, disability, sex, sexual orientation, or gender identity.            Thank you!     Thank you for choosing [facility]  for your care. Our goal is always to provide you with excellent care. Hearing back from our patients is one way we can continue to improve our services. Please take a few minutes to complete the written survey that you may receive in the mail after your visit with us. Thank you!             Your Updated Medication List - Protect others around you: Learn how to safely use, store and throw away your medicines at www.disposemymeds.org.          This list is accurate as of 1/31/18  9:41 AM.  Always use your most recent med list.                   Brand Name Dispense Instructions for use Diagnosis    aspirin 81 MG tablet      Take 1 tablet by mouth daily.        Beclomethasone Dipropionate 80 MCG/ACT Aers Nasal Spray    QNASL    8.7 g    Spray 2 sprays into both nostrils daily    Chronic rhinitis       cholecalciferol 53154 UNITS capsule    VITAMIN D3    24 capsule    Take 1 tablet twice weekly    Breast cancer, left (H)       lisinopril 20 MG tablet    PRINIVIL/ZESTRIL    30 tablet    Take 1 tablet (20 mg) by mouth daily    Benign essential hypertension       LORazepam 0.5 MG tablet    ATIVAN    10 tablet    Take 0.5-1 tablets (0.25-0.5 mg) by mouth every 8 hours as needed for anxiety Take 30 minutes prior to departure.  Do not operate a vehicle after taking this medication    Flying phobia       tamoxifen 20 MG tablet    NOLVADEX    90 tablet    Take 1 tablet (20 mg) by mouth daily    Breast cancer (H)

## 2018-01-31 NOTE — PROGRESS NOTES
CHIEF COMPLAINT AND REASON FOR VISIT: left breast cancer, s/p  Arimidex, now on tamoxifen since 12/2013.     HISTORY OF PRESENT ILLNESS: She was diagnosed with left breast cancer, infiltrating ductal cancer, grade 3, ER/[place] positive, HER-2 negative, 8/14 positive nodes, modified left radical mastectomy 03/2006, status post dose-dense Adriamycin and cyclophosphamide followed by Taxol after mastectomy, finished 08/2006. Finished radiation 10/2006.   Arimidex was started in 09/2006. She was not willing to be off it. It was continued to 2013 when 10 yrs tamoxifen data came out. It was changed to tamoxifen in 12/2013.    Maryse Hanson had a prophylactic right mastectomy in 12/2009 and immediate reconstruction. The left side is a TRAM flap and the right side is an implant.   Final pathology from the right breast indicating proliferative fibrocystic changes, radial scar, sclerosing adenosis, sclerosed fibroadenoma.     PAST MEDICAL HISTORY: HTN on ACEin, external hemorrhoidectomy    MEDICATIONS/ALLERGIES: reviewed in [facility]    OB/GYN HISTORY: Hysterectomy, bilateral salpingo-oophorectomy in 04/2007    FAMILY HISTORY: Strong family history of colon cancer, maternal side. She was tested negative  for BRCA1 and BRCA2.     SOCIAL HISTORY: She is .  She works in [employer]. She and daughter moved to [place]. Her twin boys are in college.     REVIEW OF SYSTEMS: Weight has been stable. She is very upbeat, taking vitamin D. No breast complaints. Occasional leg cramp.   Started on HTN by her PMD in [facility]    PHYSICAL EXAMINATION:   VITAL SIGNS: Blood pressure 125/78, pulse 71, temperature 98.2  F (36.8  C), temperature source Oral, resp. rate 16, weight 55.2 kg (121 lb 12.8 oz), SpO2 99 %, not currently breastfeeding.  GENERAL APPEARANCE: A young woman, looks like her stated age, not in acute distress.   BREASTS: Bilateral implant on left side is a TRAM flap and the right side is implant. She also got her bilateral  nipple redone.   HEENT: The patient is normocephalic, atraumatic. Pupils are equal react to light. Sclerae are anicteric. Moist oral mucosa. Negative pharynx. No oral thrush.   NECK: Supple. No jugular venous distention. Thyroid is not palpable.   LYMPH NODES: Superficial lymphadenopathy is not appreciable in the bilateral cervical, supraclavicular, axillary or inguinal adenopathy.   CARDIOVASCULAR: S1, S2 regular with no murmurs or gallops. No carotid or abdominal bruits.   PULMONARY: Lungs are clear to auscultation and percussion bilaterally. There is no wheezing or rhonchi.   GASTROINTESTINAL: Abdomen is soft, nontender. No hepatosplenomegaly. No signs of ascites. No mass appreciable.   MUSCULOSKELETAL/EXTREMITIES: No edema. No cyanotic changes. No signs of joint deformity. No lymphedema.   NEUROLOGIC: Cranial nerves II-XII are grossly intact. Sensation intact. Muscle strength and muscle tone symmetrical all through 5/5.   BACK: No spinal or paraspinal tenderness. No CVA tenderness.   SKIN: No petechiae. No rash. No signs of cellulitis.     CURRENT LAB DATA:   WBC, Marker, liver panel are satisfactory    OLD DATA REVIEWED TODAY WITH SUMMARY  Dexa 3/2017 - nl.   CXR/BONE SCAN 10/2013 - negative.  PET scan 07/2011 was negative for any malignancy. PET scan in summer of 2010 was negative for malignancy.     ASSESSMENT AND PLAN:   1. Early stage high risk breast cancer 2006 was on 7 yrs of Arimidex.  It was continued to 2013 when 10 yrs tamoxifen data came out. It was changed to tamoxifen in 12/2013.    We talked about the side effects from tamoxifen and how she should be monitored.   We talked about the recurrence pattern associated with ER + breast cancer and how we follow them up long term.    She is a good candidate for 10 yrs of tamoxifen.    She is on yrly follow up plan.     We talked about the OCP risk for her daughter's breast cancer.     2. Leg cramp from tomoxifen. Advise avoiding dehydration and Mag/K  intake. She feels it is helping.     3. FH of colon cancer. She has been on colonoscopy q 3 yrs and she is f/u with Dr. Augustin.

## 2018-02-07 ENCOUNTER — TELEPHONE (OUTPATIENT)
Dept: FAMILY MEDICINE | Facility: CLINIC | Age: 58
End: 2018-02-07

## 2018-02-07 ENCOUNTER — OFFICE VISIT (OUTPATIENT)
Dept: FAMILY MEDICINE | Facility: CLINIC | Age: 58
End: 2018-02-07
Payer: COMMERCIAL

## 2018-02-07 VITALS
TEMPERATURE: 97.4 F | HEART RATE: 78 BPM | OXYGEN SATURATION: 100 % | WEIGHT: 122.1 LBS | HEIGHT: 62 IN | SYSTOLIC BLOOD PRESSURE: 114 MMHG | BODY MASS INDEX: 22.47 KG/M2 | RESPIRATION RATE: 16 BRPM | DIASTOLIC BLOOD PRESSURE: 68 MMHG

## 2018-02-07 DIAGNOSIS — R35.0 URINARY FREQUENCY: ICD-10-CM

## 2018-02-07 DIAGNOSIS — R30.0 DYSURIA: Primary | ICD-10-CM

## 2018-02-07 DIAGNOSIS — N30.00 ACUTE CYSTITIS WITHOUT HEMATURIA: ICD-10-CM

## 2018-02-07 LAB
ALBUMIN UR-MCNC: 30 MG/DL
APPEARANCE UR: ABNORMAL
BACTERIA #/AREA URNS HPF: ABNORMAL /HPF
BILIRUB UR QL STRIP: NEGATIVE
COLOR UR AUTO: YELLOW
GLUCOSE UR STRIP-MCNC: NEGATIVE MG/DL
HGB UR QL STRIP: ABNORMAL
KETONES UR STRIP-MCNC: NEGATIVE MG/DL
LEUKOCYTE ESTERASE UR QL STRIP: ABNORMAL
NITRATE UR QL: POSITIVE
NON-SQ EPI CELLS #/AREA URNS LPF: ABNORMAL /LPF
PH UR STRIP: 6.5 PH (ref 5–7)
RBC #/AREA URNS AUTO: ABNORMAL /HPF
SOURCE: ABNORMAL
SP GR UR STRIP: 1.02 (ref 1–1.03)
UROBILINOGEN UR STRIP-ACNC: 0.2 EU/DL (ref 0.2–1)
WBC #/AREA URNS AUTO: ABNORMAL /HPF

## 2018-02-07 PROCEDURE — 87086 URINE CULTURE/COLONY COUNT: CPT | Performed by: FAMILY MEDICINE

## 2018-02-07 PROCEDURE — 81001 URINALYSIS AUTO W/SCOPE: CPT | Performed by: FAMILY MEDICINE

## 2018-02-07 PROCEDURE — 99214 OFFICE O/P EST MOD 30 MIN: CPT | Performed by: FAMILY MEDICINE

## 2018-02-07 RX ORDER — CIPROFLOXACIN 500 MG/1
500 TABLET, FILM COATED ORAL 2 TIMES DAILY
Qty: 14 TABLET | Refills: 0 | Status: SHIPPED | OUTPATIENT
Start: 2018-02-07 | End: 2018-07-06

## 2018-02-07 RX ORDER — CIPROFLOXACIN 500 MG/1
500 TABLET, FILM COATED ORAL 2 TIMES DAILY
Qty: 14 TABLET | Refills: 0 | Status: SHIPPED | OUTPATIENT
Start: 2018-02-07 | End: 2018-02-07

## 2018-02-07 NOTE — PROGRESS NOTES
SUBJECTIVE:   Maryse Hanson is a 57 year old female who presents to clinic today for the following health issues:      ED/UC Followup:    Facility: [facility]  Date of visit: 2018  Reason for visit: possible UTI  Current Status: still having sx- was given Pyridium at that visit, but that is not helping.             Problem list and histories reviewed & adjusted, as indicated.  Additional history: as documented    Patient Active Problem List   Diagnosis     Breast cancer (H)     Benign essential hypertension     Post-nasal drip     Flying phobia     Past Surgical History:   Procedure Laterality Date     CHOLECYSTECTOMY       COLONOSCOPY N/A 2017    Procedure: COLONOSCOPY;  colonoscopy / gastroscopy;  Surgeon: Trev Burks MD;  Location:  GI     complete hysterectomy      pap no longer needed     ESOPHAGOSCOPY, GASTROSCOPY, DUODENOSCOPY (EGD), COMBINED N/A 2017    Procedure: COMBINED ESOPHAGOSCOPY, GASTROSCOPY, DUODENOSCOPY (EGD), BIOPSY SINGLE OR MULTIPLE;;  Surgeon: Trev Burks MD;  Location:  GI     HEMORRHOIDECTOMY EXTERNAL N/A 2017    Procedure: HEMORRHOIDECTOMY EXTERNAL;  HEMORRHOIDECTOMY EXTERNAL;  Surgeon: Madina Augustin MD;  Location:  OR     HYSTERECTOMY, PAP NO LONGER INDICATED      vaginal     MASTECTOMY MODIFIED RADICAL BILATERAL       MASTECTOMY, BILATERAL         Social History   Substance Use Topics     Smoking status: Former Smoker     Quit date: 1993     Smokeless tobacco: Never Used      Comment: 15 yrs ago     Alcohol use 0.0 oz/week     0 Standard drinks or equivalent per week      Comment: 4-6  drinks weekly     Family History   Problem Relation Age of Onset     Colon Cancer Mother 49          Colon Cancer Maternal Aunt 44          Colon Cancer Maternal Uncle           Colon Cancer Maternal Aunt           Other Cancer Maternal Uncle      Stomach cancer         Current Outpatient  Prescriptions   Medication Sig Dispense Refill     ciprofloxacin (CIPRO) 500 MG tablet Take 1 tablet (500 mg) by mouth 2 times daily 14 tablet 0     lisinopril (PRINIVIL/ZESTRIL) 20 MG tablet Take 1 tablet (20 mg) by mouth daily 30 tablet 11     cholecalciferol (VITAMIN D3) 32760 UNITS capsule Take 1 tablet twice weekly 24 capsule 3     Beclomethasone Dipropionate (QNASL) 80 MCG/ACT AERS Nasal Spray Spray 2 sprays into both nostrils daily 8.7 g 11     LORazepam (ATIVAN) 0.5 MG tablet Take 0.5-1 tablets (0.25-0.5 mg) by mouth every 8 hours as needed for anxiety Take 30 minutes prior to departure.  Do not operate a vehicle after taking this medication 10 tablet 1     tamoxifen (NOLVADEX) 20 MG tablet Take 1 tablet (20 mg) by mouth daily 90 tablet 3     aspirin 81 MG tablet Take 1 tablet by mouth daily.       Allergies   Allergen Reactions     Sulfa Drugs      Itching, swelling, irritation     Diagnostic X-Ray Materials Itching and Rash     Recent Labs   Lab Test  01/16/18   0853  06/07/17   0834  12/28/16   1253  12/30/15   1502  02/10/10   LDL   --    --    --    --    --   72.0@   ALT  28   --   37  39   < >   --    CR   --   0.65  0.87   --    < >   --    GFRESTIMATED   --   >90  Non  GFR Calc    67   --    < >   --    GFRESTBLACK   --   >90   GFR Calc    81   --    < >   --    POTASSIUM   --   4.1  4.0   --    < >   --     < > = values in this interval not displayed.      BP Readings from Last 3 Encounters:   02/07/18 114/68   01/31/18 125/78   11/09/17 101/64    Wt Readings from Last 3 Encounters:   02/07/18 122 lb 1.6 oz (55.4 kg)   01/31/18 121 lb 12.8 oz (55.2 kg)   11/09/17 117 lb 9 oz (53.3 kg)                  Labs reviewed in EPIC    Reviewed and updated as needed this visit by clinical staff  Tobacco  Allergies  Meds  Problems  Med Hx  Surg Hx  Fam Hx  Soc Hx        Reviewed and updated as needed this visit by Provider         ROS:  Constitutional, HEENT,  "cardiovascular, pulmonary, GI, , musculoskeletal, neuro, skin, endocrine and psych systems are negative, except as otherwise noted.    OBJECTIVE:     /68  Pulse 78  Temp 97.4  F (36.3  C) (Tympanic)  Resp 16  Ht 5' 2\" (1.575 m)  Wt 122 lb 1.6 oz (55.4 kg)  SpO2 100%  Breastfeeding? No  BMI 22.33 kg/m2  Body mass index is 22.33 kg/(m^2).  GENERAL: healthy, alert and no distress  EYES: Eyes grossly normal to inspection, PERRL and conjunctivae and sclerae normal  NECK: no adenopathy, no asymmetry, masses, or scars and thyroid normal to palpation  CV: regular rate and rhythm, normal S1 S2, no S3 or S4, no murmur, click or rub, no peripheral edema and peripheral pulses strong  ABDOMEN: tenderness suprapubic and bowel sounds normal  MS: no gross musculoskeletal defects noted, no edema    Diagnostic Test Results:  Results for orders placed or performed in visit on 02/07/18 (from the past 24 hour(s))   UA with Microscopic reflex to Culture   Result Value Ref Range    Color Urine Yellow     Appearance Urine Slightly Cloudy     Glucose Urine Negative NEG^Negative mg/dL    Bilirubin Urine Negative NEG^Negative    Ketones Urine Negative NEG^Negative mg/dL    Specific Gravity Urine 1.020 1.003 - 1.035    pH Urine 6.5 5.0 - 7.0 pH    Protein Albumin Urine 30 (A) NEG^Negative mg/dL    Urobilinogen Urine 0.2 0.2 - 1.0 EU/dL    Nitrite Urine Positive (A) NEG^Negative    Blood Urine Small (A) NEG^Negative    Leukocyte Esterase Urine Small (A) NEG^Negative    Source Midstream Urine     WBC Urine 10-25 (A) OTO2^O - 2 /HPF    RBC Urine 2-5 (A) OTO2^O - 2 /HPF    Squamous Epithelial /LPF Urine Few FEW^Few /LPF    Bacteria Urine Many (A) NEG^Negative /HPF       ASSESSMENT/PLAN:         1. Dysuria  See below   - UA with Microscopic reflex to Culture    2. Urinary frequency  As below     3. Acute cystitis without hematuria  We discussed fluids , cranberry, will treat with Abx   - Urine Culture Aerobic Bacterial  - " ciprofloxacin (CIPRO) 500 MG tablet; Take 1 tablet (500 mg) by mouth 2 times daily  Dispense: 14 tablet; Refill: 0    RTC if no improving or worsening.  Pt is aware  and comfortable with the current plan.      Jacquie Lea MD  [facility]

## 2018-02-07 NOTE — NURSING NOTE
"Chief Complaint   Patient presents with     Hospital F/U     Was at [facility]) on 2/4/2018 for UTI- still having sx.       Initial /68  Pulse 78  Temp 97.4  F (36.3  C) (Tympanic)  Resp 16  Ht 5' 2\" (1.575 m)  Wt 122 lb 1.6 oz (55.4 kg)  SpO2 100%  Breastfeeding? No  BMI 22.33 kg/m2 Estimated body mass index is 22.33 kg/(m^2) as calculated from the following:    Height as of this encounter: 5' 2\" (1.575 m).    Weight as of this encounter: 122 lb 1.6 oz (55.4 kg).  Medication Reconciliation: complete    Health Maintenance Due Pending Provider Review:  NONE    n/a    Danielle Petty MA  [facility]  "

## 2018-02-07 NOTE — MR AVS SNAPSHOT
After Visit Summary   2/7/2018    Maryse Hanson    MRN: [id number]           Patient Information     Date Of Birth          1960        Visit Information        Provider Department      2/7/2018 9:30 AM Jacquie Lea MD [facility]        Today's Diagnoses     Dysuria    -  1    Urinary frequency        Acute cystitis without hematuria           Follow-ups after your visit        Your next 10 appointments already scheduled     Jan 14, 2019  8:30 AM CST   Return Visit with  Oncology Nurse   [facility] ([facility])    [facility]  [address]  [facility] [address]   [phone]            Jan 30, 2019  8:30 AM CST   Return Visit with Vannessa Mejia MD   [facility] ([facility])    [facility]  [address]  [facility] [address]   [phone]              Who to contact     If you have questions or need follow up information about today's clinic visit or your schedule please contact [facility] directly at [phone].  Normal or non-critical lab and imaging results will be communicated to you by MyChart, letter or phone within 4 business days after the clinic has received the results. If you do not hear from us within 7 days, please contact the clinic through [employer] or phone. If you have a critical or abnormal lab result, we will notify you by phone as soon as possible.  Submit refill requests through [employer] or call your pharmacy and they will forward the refill request to us. Please allow 3 business days for your refill to be completed.          Additional Information About Your Visit        MyChart Information     [employer] gives you secure access to your electronic health record. If you see a primary care provider, you can also send messages to your care team and make appointments. If you have questions, please call your primary care clinic.  If  "you do not have a primary care provider, please call [phone] and they will assist you.        Care EveryWhere ID     This is your Care EveryWhere ID. This could be used by other organizations to access your [place] medical records  [id number]        Your Vitals Were     Pulse Temperature Respirations Height Pulse Oximetry Breastfeeding?    78 97.4  F (36.3  C) (Tympanic) 16 5' 2\" (1.575 m) 100% No    BMI (Body Mass Index)                   22.33 kg/m2            Blood Pressure from Last 3 Encounters:   02/07/18 114/68   01/31/18 125/78   11/09/17 101/64    Weight from Last 3 Encounters:   02/07/18 122 lb 1.6 oz (55.4 kg)   01/31/18 121 lb 12.8 oz (55.2 kg)   11/09/17 117 lb 9 oz (53.3 kg)              We Performed the Following     UA with Microscopic reflex to Culture     Urine Culture Aerobic Bacterial          Today's Medication Changes          These changes are accurate as of 2/7/18 10:38 AM.  If you have any questions, ask your nurse or doctor.               Start taking these medicines.        Dose/Directions    ciprofloxacin 500 MG tablet   Commonly known as:  CIPRO   Used for:  Acute cystitis without hematuria   Started by:  Jacquie Lea MD        Dose:  500 mg   Take 1 tablet (500 mg) by mouth 2 times daily   Quantity:  14 tablet   Refills:  0            Where to get your medicines      These medications were sent to [facility]/pharmacy #[id number] - [facility] [address] [address]     Phone:  [phone]     ciprofloxacin 500 MG tablet                Primary Care Provider Office Phone # Fax #    Gomez Jovany العراقي -[phone] [address] [facility] [address]        Equal Access to Services     JEREMI MCNEAL AH: Adam Chavez, lucille cho, godwin curiel, eric perdue. So [facility] [phone].    ATENCIÓN: Si habla español, tiene a sellers disposición servicios gratuitos de asistencia " lingüísticaFlorentino Brewster al [phone].    We comply with applicable federal civil rights laws and [place] laws. We do not discriminate on the basis of race, color, national origin, age, disability, sex, sexual orientation, or gender identity.            Thank you!     Thank you for choosing [facility]  for your care. Our goal is always to provide you with excellent care. Hearing back from our patients is one way we can continue to improve our services. Please take a few minutes to complete the written survey that you may receive in the mail after your visit with us. Thank you!             Your Updated Medication List - Protect others around you: Learn how to safely use, store and throw away your medicines at www.disposemymeds.org.          This list is accurate as of 2/7/18 10:38 AM.  Always use your most recent med list.                   Brand Name Dispense Instructions for use Diagnosis    aspirin 81 MG tablet      Take 1 tablet by mouth daily.        Beclomethasone Dipropionate 80 MCG/ACT Aers Nasal Spray    QNASL    8.7 g    Spray 2 sprays into both nostrils daily    Chronic rhinitis       cholecalciferol 92614 UNITS capsule    VITAMIN D3    24 capsule    Take 1 tablet twice weekly    Breast cancer, left (H)       ciprofloxacin 500 MG tablet    CIPRO    14 tablet    Take 1 tablet (500 mg) by mouth 2 times daily    Acute cystitis without hematuria       lisinopril 20 MG tablet    PRINIVIL/ZESTRIL    30 tablet    Take 1 tablet (20 mg) by mouth daily    Benign essential hypertension       LORazepam 0.5 MG tablet    ATIVAN    10 tablet    Take 0.5-1 tablets (0.25-0.5 mg) by mouth every 8 hours as needed for anxiety Take 30 minutes prior to departure.  Do not operate a vehicle after taking this medication    Flying phobia       tamoxifen 20 MG tablet    NOLVADEX    90 tablet    Take 1 tablet (20 mg) by mouth daily    Breast cancer (H)

## 2018-02-08 LAB
BACTERIA SPEC CULT: NORMAL
SPECIMEN SOURCE: NORMAL

## 2018-03-14 ENCOUNTER — TELEPHONE (OUTPATIENT)
Dept: FAMILY MEDICINE | Facility: CLINIC | Age: 58
End: 2018-03-14

## 2018-03-14 NOTE — LETTER
fax it to [facility] [phone].      Re:  Maryse Hanson  [address]   [facility] [address]  : 1960  Member ID [id number]   To whom it may concern:  genevaase be advised that Maryse Hanson is a patient of mine with a diagnosis of   Patient Active Problem List   Diagnosis     Breast cancer (H)     Benign essential hypertension     Post-nasal drip     Flying phobia     She has been stable on the use of QNASL 80mcg/act nasal spray  She cannot use formulary alternatives including  1.Flunisolide spray  2. Fluticasone spray  3. Mometasone spray  4. Triamcinolone spray    Because she has an intolerance to the preservative present in the other sprays.  QNASL 80mcg/act nasal spray does not have a preservative    Therefore it is of utmost importance that the patient be able to continue this formulary alternative  Sincerely,    Gomez العراقي MD

## 2018-03-14 NOTE — TELEPHONE ENCOUNTER
Reason for Call:  Other prescription    Detailed comments: patient was checking on a PA for Beclomethasone Dipropionate (QNASL) 80 MCG/ACT AERS Nasal Spray it is the only [id number] [place], [place] - [address]  Told patient they sent in a PA request for this, it is no longer on the formulary for her insurance.  She said she needs this one as it does not have preservatives and she has reactions to ones that do.    Phone Number Patient can be reached at: Cell number on file:    Telephone Information:   Mobile [phone]     Best Time: asap    Can we leave a detailed message on this number? YES    Call taken on 3/14/2018 at 2:07 PM by Kavya Alfaro

## 2018-03-19 NOTE — TELEPHONE ENCOUNTER
Prior Authorization Retail Medication Request    Medication/Dose: Beclomethasone Dipropionate (QNASL) 80mcg/act nasal spray  ICD code (if different than what is on RX):  Previously Tried and Failed: Flonase 50mcg/act nasal spray  Rationale: QNASL does not have preservatives and she has reactions to ones that do    Insurance Name: [employer]  Insurance ID: [id number]      Pharmacy Information (if different than what is on RX)  Name: [facility] pharmacy   Phone:[phone]

## 2018-03-20 NOTE — TELEPHONE ENCOUNTER
PA Initiation    Medication: QNASL 80mcg/act nasal spray  Insurance Company: [employer] - Phone [phone] Fax [phone]  Pharmacy Filling the Rx: [facility][id number] - [place], [place] - [address]  Filling Pharmacy Phone: [phone]  Filling Pharmacy Fax:    Start Date: 3/20/2018

## 2018-03-22 NOTE — TELEPHONE ENCOUNTER
PRIOR AUTHORIZATION DENIED    Medication: QNASL 80mcg/act nasal spray - DENIED    Denial Date: 3/21/2018    Denial Rational:     Appeal Information:

## 2018-03-22 NOTE — TELEPHONE ENCOUNTER
JS  QNASL nasal spray PA was denied.  Approval requires trial of all formulary alternatives:  1.Flunisolide spray  2. Fluticasone spray  3. Mometasone spray  4. Triamcinolone spray  Pt has tried Fluticasone but tolerates QNASL as it does not have a preservative in the ingredients.  Would you like to appeal by faxing a letter of medical necessity to [phone]?

## 2018-03-26 NOTE — TELEPHONE ENCOUNTER
Kamla,  Please see message below from JF.  Wants to do medical necessity letter.  Thanks,   Sarah Bean RN

## 2018-03-30 DIAGNOSIS — C50.919 BREAST CANCER (H): ICD-10-CM

## 2018-03-30 RX ORDER — TAMOXIFEN CITRATE 20 MG/1
20 TABLET ORAL DAILY
Qty: 90 TABLET | Refills: 3 | Status: SHIPPED | OUTPATIENT
Start: 2018-03-30 | End: 2019-01-22

## 2018-04-03 NOTE — TELEPHONE ENCOUNTER
Nataly from [facility] [phone] Ex: [phone] is calling to confirm if patient has ever had any nasal spray alternatives?  Please call Nataly back and confirm is pt has tried other Rx.

## 2018-04-05 NOTE — TELEPHONE ENCOUNTER
Prior Authorization Approval    Authorization Effective Date: 4/4/2018  Authorization Expiration Date: 4/4/2019  Medication: QNASL 80mcg/act nasal spray - Approved   Reference #: [id number]   Insurance Company: [employer] - Phone [phone] Fax [phone]  Which Pharmacy is filling the prescription (Not needed for infusion/clinic administered): [facility][id number] - [place], [place] - [address]  Pharmacy Notified: No    Patient Notified:  No

## 2018-05-30 DIAGNOSIS — J31.0 CHRONIC RHINITIS: ICD-10-CM

## 2018-05-30 NOTE — TELEPHONE ENCOUNTER
Pending Prescriptions:                       Disp   Refills    QNASL 80 MCG/ACT AERS Nasal Spray [Pharma*8.7 g  10           Sig: SPRAY 2 SPRAYS INTO BOTH NOSTRILS DAILY          Last Written Prescription Date:  04/24/2017  Last Fill Quantity: 1,   # refills: 11  Last Office Visit: 02/07/2018  Future Office visit:       Routing refill request to provider for review/approval because:  No protocol completed

## 2018-05-31 RX ORDER — BECLOMETHASONE DIPROPIONATE 80 UG/1
AEROSOL, METERED NASAL
Qty: 8.7 G | Refills: 1 | Status: SHIPPED | OUTPATIENT
Start: 2018-05-31 | End: 2018-08-31

## 2018-05-31 NOTE — TELEPHONE ENCOUNTER
Prescription approved per [facility] Refill Protocol.  Sarah Bean RN

## 2018-07-02 DIAGNOSIS — F40.243 FLYING PHOBIA: ICD-10-CM

## 2018-07-02 RX ORDER — LORAZEPAM 0.5 MG/1
0.25-0.5 TABLET ORAL EVERY 8 HOURS PRN
Qty: 10 TABLET | Refills: 1 | Status: CANCELLED | OUTPATIENT
Start: 2018-07-02

## 2018-07-03 ENCOUNTER — MYC MEDICAL ADVICE (OUTPATIENT)
Dept: FAMILY MEDICINE | Facility: CLINIC | Age: 58
End: 2018-07-03

## 2018-07-03 NOTE — TELEPHONE ENCOUNTER
Last Rx from over 1 year ago  Uses for flight anxiety  MyChart sent to pt - asked if flying soon  Radha ALVARES RN

## 2018-07-05 NOTE — TELEPHONE ENCOUNTER
JF,   Patient responded to [employer] message:  Yes. It is for flying. I have two business trips this month, I leave on the first trip this coming Monday. I will also be traveling for work in September, October and November.   -Maryse    Controlled Substance Refill Request for Ativan    [place]  - last Rx written over 1 year ago - April 2017    Last clinic visit: 2/7/2018 for acute issue - anxiety discussed over 1 year ago     Clinic visit frequency required: not documented  Next appt: none    Controlled substance agreement on file: No.    Documentation in problem list reviewed:  started, needs to be completed    Processing:  Fax Rx to [facility] [address] [facility]    [place] reviewed:  reviewed- recommend provider review  Patient filled #20 Hydrocodone 11/9/2017 from Dr Augustin  [place]:  [email or web address]    Please authorize if appropriate.  Thanks,  Radha ALVARES RN

## 2018-07-06 ENCOUNTER — VIRTUAL VISIT (OUTPATIENT)
Dept: FAMILY MEDICINE | Facility: CLINIC | Age: 58
End: 2018-07-06
Payer: COMMERCIAL

## 2018-07-06 DIAGNOSIS — F40.243 FLYING PHOBIA: ICD-10-CM

## 2018-07-06 PROCEDURE — 99441 ZZC PHYSICIAN TELEPHONE EVALUATION 5-10 MIN: CPT | Performed by: FAMILY MEDICINE

## 2018-07-06 RX ORDER — LORAZEPAM 1 MG/1
0.5 TABLET ORAL EVERY 8 HOURS PRN
Qty: 10 TABLET | Refills: 1 | Status: SHIPPED | OUTPATIENT
Start: 2018-07-06 | End: 2019-09-13

## 2018-07-06 NOTE — PROGRESS NOTES
"Maryse Hanson is a 58 year old female who is being evaluated via a telephone visit.      The patient has been notified of following:     \"This telephone visit will be conducted via a call between you and your physician/provider. We have found that certain health care needs can be provided without the need for a physical exam.  This service lets us provide the care you need with a short phone conversation.  If a prescription is necessary we can send it directly to your pharmacy.  If lab work is needed we can place an order for that and you can then stop by our lab to have the test done at a later time.    We will bill your insurance company for this service.  Please check with your medical insurance if this type of visit is covered. You may be responsible for the cost of this type of visit if insurance coverage is denied.  The typical cost is $30 (10min), $59 (11-20min) and $85 (21-30min).  Most often these visits are shorter than 10 minutes.    If during the course of the call the physician/provider feels a telephone visit is not appropriate, you will not be charged for this service.\"       Consent has been obtained for this service by care team member: yes.   See the scanned image in the medical record.    Maryse Hanson complains of  Medication Request      I have reviewed and updated the patient's Past Medical History, Social History, Family History and Medication List.    ALLERGIES  Sulfa drugs and Diagnostic x-ray materials    [facility] [place] ([place] signature)    Additional provider notes:     Taking 1 for flight.  Has been doing this for years    Patient reports: medication helping,     Typical side effects of  Medication.    Recommend using it as a rescue and not every time    no side effects, no new prescriptions by other physicians    Reviewed [place] prescription drug registry. No concerning patterns of Rx use in this state, or any other state in the past year that I can find    I require a face to " face visit every 6 months, so asked to plan on scheduling that visit as I will not refill the medication without it.    Gomez العراقي MD MPH      Assessment/Plan:  1. Flying phobia    - LORazepam (ATIVAN) 1 MG tablet; Take 0.5 tablets (0.5 mg) by mouth every 8 hours as needed for anxiety Take 30 minutes prior to departure.  Do not operate a vehicle after taking this medication  Dispense: 10 tablet; Refill: 1        I have reviewed the note as documented above.  This accurately captures the substance of my conversation with the patient,      Total time of call between patient and provider was 8 minutes

## 2018-07-06 NOTE — MR AVS SNAPSHOT
After Visit Summary   7/6/2018    Maryse Hanson    MRN: [id number]           Patient Information     Date Of Birth          1960        Visit Information        Provider Department      7/6/2018 3:30 PM Gomez العراقي MD [facility]        Today's Diagnoses     Flying phobia           Follow-ups after your visit        Your next 10 appointments already scheduled     Jan 14, 2019  8:30 AM CST   Return Visit with  Oncology Nurse   [facility] ([facility])    [facility]  [address]  [facility] [address]   [phone]            Jan 30, 2019  8:40 AM CST   Return Visit with Vannessa Mejia MD   [facility] ([facility])    [facility]  [address]  [facility] [address]   [phone]              Who to contact     If you have questions or need follow up information about today's clinic visit or your schedule please contact [facility] directly at [phone].  Normal or non-critical lab and imaging results will be communicated to you by [employer], letter or phone within 4 business days after the clinic has received the results. If you do not hear from us within 7 days, please contact the clinic through [employer] or phone. If you have a critical or abnormal lab result, we will notify you by phone as soon as possible.  Submit refill requests through [employer] or call your pharmacy and they will forward the refill request to us. Please allow 3 business days for your refill to be completed.          Additional Information About Your Visit        MyChart Information     [employer] gives you secure access to your electronic health record. If you see a primary care provider, you can also send messages to your care team and make appointments. If you have questions, please call your primary care clinic.  If you do not have a primary care provider, please call  [phone] and they will assist you.        Care EveryWhere ID     This is your Care EveryWhere ID. This could be used by other organizations to access your [place] medical records  [id number]         Blood Pressure from Last 3 Encounters:   02/07/18 114/68   01/31/18 125/78   11/09/17 101/64    Weight from Last 3 Encounters:   02/07/18 122 lb 1.6 oz (55.4 kg)   01/31/18 121 lb 12.8 oz (55.2 kg)   11/09/17 117 lb 9 oz (53.3 kg)              Today, you had the following     No orders found for display         Today's Medication Changes          These changes are accurate as of 7/6/18  3:30 PM.  If you have any questions, ask your nurse or doctor.               These medicines have changed or have updated prescriptions.        Dose/Directions    LORazepam 1 MG tablet   Commonly known as:  ATIVAN   This may have changed:    - medication strength  - how much to take   Used for:  Flying phobia        Dose:  0.5 mg   Take 0.5 tablets (0.5 mg) by mouth every 8 hours as needed for anxiety Take 30 minutes prior to departure.  Do not operate a vehicle after taking this medication   Quantity:  10 tablet   Refills:  1            Where to get your medicines      Some of these will need a paper prescription and others can be bought over the counter.  Ask your nurse if you have questions.     Bring a paper prescription for each of these medications     LORazepam 1 MG tablet                Primary Care Provider Office Phone # Fax #    Gomez Jovany العراقي -[phone] [address] [facility] [address]        Equal Access to Services     JEREMI MCNEAL : Hadnoemi lee Sograciela, waaxda luqadaha, qaybta kaalmada eric curiel. So [facility] [phone].    ATENCIÓN: Si habla español, tiene a sellers disposición servicios gratuitos de asistencia lingüística. Llame al [phone].    We comply with applicable federal civil rights laws and [place] laws. We do not  discriminate on the basis of race, color, national origin, age, disability, sex, sexual orientation, or gender identity.            Thank you!     Thank you for choosing [facility]  for your care. Our goal is always to provide you with excellent care. Hearing back from our patients is one way we can continue to improve our services. Please take a few minutes to complete the written survey that you may receive in the mail after your visit with us. Thank you!             Your Updated Medication List - Protect others around you: Learn how to safely use, store and throw away your medicines at www.disposemymeds.org.          This list is accurate as of 7/6/18  3:30 PM.  Always use your most recent med list.                   Brand Name Dispense Instructions for use Diagnosis    aspirin 81 MG tablet      Take 1 tablet by mouth daily.        cholecalciferol 57343 units capsule    VITAMIN D3    24 capsule    Take 1 tablet twice weekly    Breast cancer, left (H)       lisinopril 20 MG tablet    PRINIVIL/ZESTRIL    30 tablet    Take 1 tablet (20 mg) by mouth daily    Benign essential hypertension       LORazepam 1 MG tablet    ATIVAN    10 tablet    Take 0.5 tablets (0.5 mg) by mouth every 8 hours as needed for anxiety Take 30 minutes prior to departure.  Do not operate a vehicle after taking this medication    Flying phobia       QNASL 80 MCG/ACT Aers Nasal [place]   Generic drug:  Beclomethasone Dipropionate     8.7 g    SPRAY 2 SPRAYS INTO BOTH NOSTRILS DAILY    Chronic rhinitis       tamoxifen 20 MG tablet    NOLVADEX    90 tablet    Take 1 tablet (20 mg) by mouth daily    Breast cancer (H)

## 2018-07-06 NOTE — TELEPHONE ENCOUNTER
Patient informed.     Next 5 appointments (look out 90 days)     Jul 06, 2018  3:30 PM CDT   Telephone Visit with Gomez العراقي MD   [facility] ([facility])    [address] [facility] [address]   [phone]                Radha ALVARES RN

## 2018-07-25 DIAGNOSIS — R79.89 LOW VITAMIN D LEVEL: Primary | ICD-10-CM

## 2018-07-25 DIAGNOSIS — C50.912 BREAST CANCER, LEFT (H): ICD-10-CM

## 2018-07-26 DIAGNOSIS — I10 BENIGN ESSENTIAL HYPERTENSION: ICD-10-CM

## 2018-07-26 NOTE — TELEPHONE ENCOUNTER
"Requested Prescriptions   Pending Prescriptions Disp Refills     lisinopril (PRINIVIL/ZESTRIL) 20 MG tablet 30 tablet 11     Sig: Take 1 tablet (20 mg) by mouth daily    ACE Inhibitors (Including Combos) Protocol Failed    7/26/2018 11:19 AM       Failed - Normal serum creatinine on file in past 12 months    Recent Labs   Lab Test  06/07/17   0834   CR  0.65            Failed - Normal serum potassium on file in past 12 months    Recent Labs   Lab Test  06/07/17   0834   POTASSIUM  4.1            Passed - Blood pressure under 140/90 in past 12 months    BP Readings from Last 3 Encounters:   02/07/18 114/68   01/31/18 125/78   11/09/17 101/64                Passed - Recent (12 mo) or future (30 days) visit within the authorizing provider's specialty    Patient had office visit in the last 12 months or has a visit in the next 30 days with authorizing provider or within the authorizing provider's specialty.  See \"Patient Info\" tab in inbasket, or \"Choose Columns\" in Meds & Orders section of the refill encounter.           Passed - Patient is age 18 or older       Passed - No active pregnancy on record       Passed - No positive pregnancy test in past 12 months        "

## 2018-07-27 RX ORDER — LISINOPRIL 20 MG/1
20 TABLET ORAL DAILY
Qty: 30 TABLET | Refills: 0 | Status: SHIPPED | OUTPATIENT
Start: 2018-07-27 | End: 2018-08-31

## 2018-07-27 NOTE — TELEPHONE ENCOUNTER
Pharmacy sends request for new Lisinopril RX. Pt last seen for Telephone visit with Dr. العراقي for Ativan refill.   Last in office visit 2-16-18 with Dr. Lea for UTI-BP at that visit 114/68. pls advise.AKIL Monroy, CMA

## 2018-07-27 NOTE — TELEPHONE ENCOUNTER
Medication is being filled for 1 time refill only due to:  Patient needs to be seen because it has been more than one year since last visit.   HTN last addressed 7/6/17  Last OV 2/16/18 was ER follow up for Cystitis.  Sarah Bean RN

## 2018-08-31 ENCOUNTER — OFFICE VISIT (OUTPATIENT)
Dept: FAMILY MEDICINE | Facility: CLINIC | Age: 58
End: 2018-08-31
Payer: COMMERCIAL

## 2018-08-31 VITALS
BODY MASS INDEX: 22.16 KG/M2 | DIASTOLIC BLOOD PRESSURE: 84 MMHG | HEART RATE: 78 BPM | WEIGHT: 120.4 LBS | SYSTOLIC BLOOD PRESSURE: 133 MMHG | TEMPERATURE: 98.1 F | HEIGHT: 62 IN | RESPIRATION RATE: 16 BRPM | OXYGEN SATURATION: 100 %

## 2018-08-31 DIAGNOSIS — Z00.00 ROUTINE HISTORY AND PHYSICAL EXAMINATION OF ADULT: Primary | ICD-10-CM

## 2018-08-31 DIAGNOSIS — Z17.0 MALIGNANT NEOPLASM OF UPPER-OUTER QUADRANT OF LEFT BREAST IN FEMALE, ESTROGEN RECEPTOR POSITIVE (H): ICD-10-CM

## 2018-08-31 DIAGNOSIS — I10 BENIGN ESSENTIAL HYPERTENSION: ICD-10-CM

## 2018-08-31 DIAGNOSIS — J31.0 OTHER CHRONIC RHINITIS: ICD-10-CM

## 2018-08-31 DIAGNOSIS — C50.412 MALIGNANT NEOPLASM OF UPPER-OUTER QUADRANT OF LEFT BREAST IN FEMALE, ESTROGEN RECEPTOR POSITIVE (H): ICD-10-CM

## 2018-08-31 DIAGNOSIS — Z11.59 NEED FOR HEPATITIS C SCREENING TEST: ICD-10-CM

## 2018-08-31 DIAGNOSIS — Z11.3 SCREEN FOR STD (SEXUALLY TRANSMITTED DISEASE): ICD-10-CM

## 2018-08-31 LAB
ANION GAP SERPL CALCULATED.3IONS-SCNC: 8 MMOL/L (ref 3–14)
BUN SERPL-MCNC: 14 MG/DL (ref 7–30)
CALCIUM SERPL-MCNC: 9.4 MG/DL (ref 8.5–10.1)
CHLORIDE SERPL-SCNC: 103 MMOL/L (ref 94–109)
CHOLEST SERPL-MCNC: 215 MG/DL
CO2 SERPL-SCNC: 27 MMOL/L (ref 20–32)
CREAT SERPL-MCNC: 0.72 MG/DL (ref 0.52–1.04)
GFR SERPL CREATININE-BSD FRML MDRD: 83 ML/MIN/1.7M2
GLUCOSE SERPL-MCNC: 91 MG/DL (ref 70–99)
HDLC SERPL-MCNC: 72 MG/DL
HIV 1+2 AB+HIV1 P24 AG SERPL QL IA: NONREACTIVE
LDLC SERPL CALC-MCNC: 106 MG/DL
NONHDLC SERPL-MCNC: 143 MG/DL
POTASSIUM SERPL-SCNC: 4.6 MMOL/L (ref 3.4–5.3)
SODIUM SERPL-SCNC: 138 MMOL/L (ref 133–144)
TRIGL SERPL-MCNC: 187 MG/DL

## 2018-08-31 PROCEDURE — 87389 HIV-1 AG W/HIV-1&-2 AB AG IA: CPT | Performed by: FAMILY MEDICINE

## 2018-08-31 PROCEDURE — 80061 LIPID PANEL: CPT | Performed by: FAMILY MEDICINE

## 2018-08-31 PROCEDURE — 80048 BASIC METABOLIC PNL TOTAL CA: CPT | Performed by: FAMILY MEDICINE

## 2018-08-31 PROCEDURE — 87491 CHLMYD TRACH DNA AMP PROBE: CPT | Performed by: FAMILY MEDICINE

## 2018-08-31 PROCEDURE — 36415 COLL VENOUS BLD VENIPUNCTURE: CPT | Performed by: FAMILY MEDICINE

## 2018-08-31 PROCEDURE — 99396 PREV VISIT EST AGE 40-64: CPT | Performed by: FAMILY MEDICINE

## 2018-08-31 RX ORDER — LISINOPRIL 20 MG/1
20 TABLET ORAL DAILY
Qty: 90 TABLET | Refills: 3 | Status: SHIPPED | OUTPATIENT
Start: 2018-08-31 | End: 2019-08-16

## 2018-08-31 NOTE — MR AVS SNAPSHOT
After Visit Summary   8/31/2018    Maryse Goins    MRN: [id number]           Patient Information     Date Of Birth          1960        Visit Information        Provider Department      8/31/2018 10:00 AM Gomez العراقي MD [facility]        Today's Diagnoses     Routine history and physical examination of adult    -  1    Benign essential hypertension        Other chronic rhinitis        Malignant neoplasm of upper-outer quadrant of left breast in female, estrogen receptor positive (H)        Need for hepatitis C screening test        Screen for STD (sexually transmitted disease)          Care Instructions      Preventive Health Recommendations  Female Ages 50 - 64    Yearly exam: See your health care provider every year in order to  o Review health changes.   o Discuss preventive care.    o Review your medicines if your doctor has prescribed any.      Get a Pap test every three years (unless you have an abnormal result and your provider advises testing more often).    If you get Pap tests with HPV test, you only need to test every 5 years, unless you have an abnormal result.     You do not need a Pap test if your uterus was removed (hysterectomy) and you have not had cancer.    You should be tested each year for STDs (sexually transmitted diseases) if you're at risk.     Have a mammogram every 1 to 2 years.    Have a colonoscopy at age 50, or have a yearly FIT test (stool test). These exams screen for colon cancer.      Have a cholesterol test every 5 years, or more often if advised.    Have a diabetes test (fasting glucose) every three years. If you are at risk for diabetes, you should have this test more often.     If you are at risk for osteoporosis (brittle bone disease), think about having a bone density scan (DEXA).    Shots: Get a flu shot each year. Get a tetanus shot every 10 years.    Nutrition:     Eat at least 5 servings of fruits and vegetables each  day.    Eat whole-grain bread, whole-wheat pasta and brown rice instead of white grains and rice.    Get adequate Calcium and Vitamin D.     Lifestyle    Exercise at least 150 minutes a week (30 minutes a day, 5 days a week). This will help you control your weight and prevent disease.    Limit alcohol to one drink per day.    No smoking.     Wear sunscreen to prevent skin cancer.     See your dentist every six months for an exam and cleaning.    See your eye doctor every 1 to 2 years.            Follow-ups after your visit        Follow-up notes from your care team     Return in about 1 year (around 8/31/2019).      Your next 10 appointments already scheduled     Jan 14, 2019  8:30 AM CST   Return Visit with  Oncology Nurse   [facility] ([facility])    [facility]  [address]  [facility] [address]   [phone]            Jan 30, 2019  8:40 AM CST   Return Visit with Vannessa Mejia MD   [facility] ([facility])    [facility]  [address]  [facility] [address]   [phone]              Future tests that were ordered for you today     Open Future Orders        Priority Expected Expires Ordered    **Hepatitis C Screen Reflex to RNA FUTURE anytime Routine 8/31/2018 8/31/2019 8/31/2018            Who to contact     If you have questions or need follow up information about today's clinic visit or your schedule please contact [facility] [place] [facility] directly at [phone].  Normal or non-critical lab and imaging results will be communicated to you by MyChart, letter or phone within 4 business days after the clinic has received the results. If you do not hear from us within 7 days, please contact the clinic through [employer] or phone. If you have a critical or abnormal lab result, we will notify you by phone as soon as possible.  Submit refill requests through [employer] or call your pharmacy and they  "will forward the refill request to us. Please allow 3 business days for your refill to be completed.          Additional Information About Your Visit        [employer] Information     [employer] gives you secure access to your electronic health record. If you see a primary care provider, you can also send messages to your care team and make appointments. If you have questions, please call your primary care clinic.  If you do not have a primary care provider, please call [phone] and they will assist you.        Care EveryWhere ID     This is your Care EveryWhere ID. This could be used by other organizations to access your [place] medical records  [id number]        Your Vitals Were     Pulse Temperature Respirations Height Pulse Oximetry Breastfeeding?    78 98.1  F (36.7  C) (Oral) 16 5' 2\" (1.575 m) 100% No    BMI (Body Mass Index)                   22.02 kg/m2            Blood Pressure from Last 3 Encounters:   08/31/18 133/84   02/07/18 114/68   01/31/18 125/78    Weight from Last 3 Encounters:   08/31/18 120 lb 6.4 oz (54.6 kg)   02/07/18 122 lb 1.6 oz (55.4 kg)   01/31/18 121 lb 12.8 oz (55.2 kg)              We Performed the Following     Basic metabolic panel     Chlamydia trachomatis PCR     HIV Antigen Antibody Combo     Lipid panel reflex to direct LDL Non-fasting          Where to get your medicines      These medications were sent to [id number] - [place], [place] - [address]  [address] [facility] [address]     Phone:  [phone]     Beclomethasone Dipropionate 80 MCG/ACT Aers Nasal Spray    lisinopril 20 MG tablet          Primary Care Provider Office Phone # Fax #    Gomez Jovany العراقي -[phone] [address] [facility] [address]        Equal Access to Services     [facility] FREDIS : Adam christiano Sograciela, waaxda luqadaha, qaybta kaalmada kokiyafreddy, eric perdue. So [facility] [phone].    ATENCIÓN: Si gurdeep ritchie " sellers disposición servicios gratuitos de asistencia lingüística. Ney mueller [phone].    We comply with applicable federal civil rights laws and [place] laws. We do not discriminate on the basis of race, color, national origin, age, disability, sex, sexual orientation, or gender identity.            Thank you!     Thank you for choosing [facility]  for your care. Our goal is always to provide you with excellent care. Hearing back from our patients is one way we can continue to improve our services. Please take a few minutes to complete the written survey that you may receive in the mail after your visit with us. Thank you!             Your Updated Medication List - Protect others around you: Learn how to safely use, store and throw away your medicines at www.disposemymeds.org.          This list is accurate as of 8/31/18 10:52 AM.  Always use your most recent med list.                   Brand Name Dispense Instructions for use Diagnosis    aspirin 81 MG tablet      Take 1 tablet by mouth daily.        Beclomethasone Dipropionate 80 MCG/ACT Aers Nasal Spray    QNASL    8.7 g    Spray 2 sprays into both nostrils daily    Other chronic rhinitis       cholecalciferol 98434 units capsule    VITAMIN D3    24 capsule    Take 1 tablet twice weekly    Low vitamin D level       lisinopril 20 MG tablet    PRINIVIL/ZESTRIL    90 tablet    Take 1 tablet (20 mg) by mouth daily    Benign essential hypertension       LORazepam 1 MG tablet    ATIVAN    10 tablet    Take 0.5 tablets (0.5 mg) by mouth every 8 hours as needed for anxiety Take 30 minutes prior to departure.  Do not operate a vehicle after taking this medication    Flying phobia       tamoxifen 20 MG tablet    NOLVADEX    90 tablet    Take 1 tablet (20 mg) by mouth daily    Breast cancer (H)

## 2018-08-31 NOTE — PROGRESS NOTES
SUBJECTIVE:   CC: Maryse Goins is an 58 year old woman who presents for preventive health visit.     Physical   Annual:     Getting at least 3 servings of Calcium per day:  Yes    Bi-annual eye exam:  Yes    Dental care twice a year:  Yes    Sleep apnea or symptoms of sleep apnea:  None    Diet:  Regular (no restrictions)    Frequency of exercise:  4-5 days/week    Duration of exercise:  45-60 minutes    Taking medications regularly:  Yes    Medication side effects:  None    Additional concerns today:  No        Today's PHQ-2 Score:   PHQ-2 ( 1999 [employer]) 8/31/2018   Q1: Little interest or pleasure in doing things 0   Q2: Feeling down, depressed or hopeless 0   PHQ-2 Score 0   Q1: Little interest or pleasure in doing things Not at all   Q2: Feeling down, depressed or hopeless Not at all   PHQ-2 Score 0       Abuse: Current or Past(Physical, Sexual or Emotional)- No  Do you feel safe in your environment - Yes    Social History   Substance Use Topics     Smoking status: Former Smoker     Quit date: 11/9/1993     Smokeless tobacco: Never Used      Comment: 15 yrs ago     Alcohol use 0.0 oz/week     0 Standard drinks or equivalent per week      Comment: 4-6  drinks weekly     Alcohol Use 8/31/2018   If you drink alcohol do you typically have greater than 3 drinks per day OR greater than 7 drinks per week? No   No flowsheet data found.    Reviewed orders with patient.  Reviewed health maintenance and updated orders accordingly - Yes  Labs reviewed in EPIC  BP Readings from Last 3 Encounters:   08/31/18 133/84   02/07/18 114/68   01/31/18 125/78    Wt Readings from Last 3 Encounters:   08/31/18 120 lb 6.4 oz (54.6 kg)   02/07/18 122 lb 1.6 oz (55.4 kg)   01/31/18 121 lb 12.8 oz (55.2 kg)                  Patient Active Problem List   Diagnosis     Breast cancer (H)     Benign essential hypertension     Post-nasal drip     Flying phobia     Past Surgical History:   Procedure Laterality Date     CHOLECYSTECTOMY        COLONOSCOPY N/A 2017    Procedure: COLONOSCOPY;  colonoscopy / gastroscopy;  Surgeon: Trev Burks MD;  Location:  GI     complete hysterectomy      pap no longer needed     ESOPHAGOSCOPY, GASTROSCOPY, DUODENOSCOPY (EGD), COMBINED N/A 2017    Procedure: COMBINED ESOPHAGOSCOPY, GASTROSCOPY, DUODENOSCOPY (EGD), BIOPSY SINGLE OR MULTIPLE;;  Surgeon: Trev Burks MD;  Location:  GI     HEMORRHOIDECTOMY EXTERNAL N/A 2017    Procedure: HEMORRHOIDECTOMY EXTERNAL;  HEMORRHOIDECTOMY EXTERNAL;  Surgeon: Madina Augustin MD;  Location:  OR     HYSTERECTOMY, PAP NO LONGER INDICATED      vaginal     MASTECTOMY MODIFIED RADICAL BILATERAL       MASTECTOMY, BILATERAL         Social History   Substance Use Topics     Smoking status: Former Smoker     Quit date: 1993     Smokeless tobacco: Never Used      Comment: 15 yrs ago     Alcohol use 0.0 oz/week     0 Standard drinks or equivalent per week      Comment: 4-6  drinks weekly     Family History   Problem Relation Age of Onset     Colon Cancer Mother 49          Colon Cancer Maternal Aunt 44          Colon Cancer Maternal Uncle           Colon Cancer Maternal Aunt           Other Cancer Maternal Uncle      Stomach cancer         Current Outpatient Prescriptions   Medication Sig Dispense Refill     aspirin 81 MG tablet Take 1 tablet by mouth daily.       cholecalciferol (VITAMIN D3) 18232 units capsule Take 1 tablet twice weekly 24 capsule 3     lisinopril (PRINIVIL/ZESTRIL) 20 MG tablet Take 1 tablet (20 mg) by mouth daily 30 tablet 0     LORazepam (ATIVAN) 1 MG tablet Take 0.5 tablets (0.5 mg) by mouth every 8 hours as needed for anxiety Take 30 minutes prior to departure.  Do not operate a vehicle after taking this medication 10 tablet 1     QNASL 80 MCG/ACT AERS Nasal Spray SPRAY 2 SPRAYS INTO BOTH NOSTRILS DAILY 8.7 g 1     tamoxifen (NOLVADEX) 20 MG tablet Take 1 tablet (20 mg) by mouth daily 90  "tablet 3     Allergies   Allergen Reactions     Sulfa Drugs      Itching, swelling, irritation     Diagnostic X-Ray Materials Itching and Rash     Metrizamide Itching and Rash       Alternate mammogram schedule due to breast cancer history     Pertinent mammograms are reviewed under the imaging tab.  History of abnormal Pap smear: Status post benign hysterectomy. Health Maintenance and Surgical History updated.     Reviewed and updated as needed this visit by clinical staff  Tobacco  Allergies  Meds  Problems  Med Hx  Surg Hx  Fam Hx  Soc Hx          Reviewed and updated as needed this visit by Provider            Review of Systems  CONSTITUTIONAL: NEGATIVE for fever, chills, change in weight  INTEGUMENTARY/SKIN: NEGATIVE for worrisome rashes, moles or lesions  EYES: NEGATIVE for vision changes or irritation  ENT: NEGATIVE for ear, mouth and throat problems  RESP: NEGATIVE for significant cough or SOB  BREAST: NEGATIVE for masses, tenderness or discharge  CV: NEGATIVE for chest pain, palpitations or peripheral edema  GI: NEGATIVE for nausea, abdominal pain, heartburn, or change in bowel habits  : NEGATIVE for unusual urinary or vaginal symptoms. No vaginal bleeding.  MUSCULOSKELETAL: NEGATIVE for significant arthralgias or myalgia  NEURO: NEGATIVE for weakness, dizziness or paresthesias  PSYCHIATRIC: NEGATIVE for changes in mood or affect      OBJECTIVE:   /84  Pulse 78  Temp 98.1  F (36.7  C) (Oral)  Resp 16  Ht 5' 2\" (1.575 m)  Wt 120 lb 6.4 oz (54.6 kg)  SpO2 100%  Breastfeeding? No  BMI 22.02 kg/m2  Physical Exam    General Appearance: Pleasant, alert, in no acute respiratory distress.  Head Exam: Normal. Normocephalic, atraumatic. No sinus tenderness.  Eye Exam: Normal external eye, conjunctiva, lids. TONG.  Ear Exam: Normal auditory canals and external ears. Non-tender.  Left TM-normal. Right TM-normal.  OroPharynx Exam: Dental hygiene adequate. Normal buccal mucosa. Normal " "pharynx.  Neck Exam: Supple, no masses or enlarged, tender nodes.  Thyroid Exam: No nodules or enlargement or tenderness.  Chest/Respiratory Exam: Normal, comfortable, easy respirations. Chest wall normal. Lungs are clear to auscultation. No wheezing, crackles, or rhonchi.  Cardiovascular Exam: Regular rate and rhythm. No murmur, gallop, or rubs. No pedal edema.  Gastrointestinal Exam: Soft, non-tender, no masses or organomegaly.  Musculoskeletal Exam: Back is non-tender, full ROM of upper and lower extremities.  Skin: no rash, warm and dry.    Neurologic Exam: Nonfocal, no tremor. Normal gait.  Psychiatric Exam: Alert - appropriate, normal affect      ASSESSMENT/PLAN:       ICD-10-CM    1. Routine history and physical examination of adult Z00.00    2. Benign essential hypertension I10    3. Other chronic rhinitis J31.0    4. Malignant neoplasm of upper-outer quadrant of left breast in female, estrogen receptor positive (H) C50.412     Z17.0    5. Need for hepatitis C screening test Z11.59      Patient has stable chronic conditions as noted in A/P including Benign essential hypertension, Other chronic rhinitis, Malignant neoplasm of upper-outer quadrant of left breast in female, estrogen receptor positive (H), Need for hepatitis C screening test, and Screen for STD (sexually transmitted disease) were also pertinent to this visit.    These diagnoses were each reviewed for stability and medications were reviewed and refilled, labs ordered and updated.      COUNSELING:  Reviewed preventive health counseling, as reflected in patient instructions       Regular exercise       Healthy diet/nutrition       Colon cancer screening       (Zofia)menopause management    BP Readings from Last 1 Encounters:   08/31/18 133/84     Estimated body mass index is 22.02 kg/(m^2) as calculated from the following:    Height as of this encounter: 5' 2\" (1.575 m).    Weight as of this encounter: 120 lb 6.4 oz (54.6 kg).           reports that " she quit smoking about 24 years ago. She has never used smokeless tobacco.      Counseling Resources:  ATP IV Guidelines  Pooled Cohorts Equation Calculator  Breast Cancer Risk Calculator  FRAX Risk Assessment  ICSI Preventive Guidelines  Dietary Guidelines for Americans, 2010  USDA's MyPlate  ASA Prophylaxis  Lung CA Screening    Gomez Jovany العراقي MD  [facility] CLINICAnswers for HPI/ROS submitted by the patient on 8/31/2018   PHQ-2 Score: 0

## 2018-09-03 LAB
C TRACH DNA SPEC QL NAA+PROBE: NEGATIVE
SPECIMEN SOURCE: NORMAL

## 2018-09-04 ENCOUNTER — TELEPHONE (OUTPATIENT)
Dept: FAMILY MEDICINE | Facility: CLINIC | Age: 58
End: 2018-09-04

## 2018-09-04 NOTE — TELEPHONE ENCOUNTER
Qnasl NS not covered by pt's insurance.  Formulary alternatives: Fluticasone Propionate, Flunisolide NS  [facility] [address] ph# [phone]

## 2018-09-05 ENCOUNTER — TRANSFERRED RECORDS (OUTPATIENT)
Dept: HEALTH INFORMATION MANAGEMENT | Facility: CLINIC | Age: 58
End: 2018-09-05

## 2018-09-06 ENCOUNTER — TELEPHONE (OUTPATIENT)
Dept: FAMILY MEDICINE | Facility: CLINIC | Age: 58
End: 2018-09-06

## 2018-09-06 NOTE — TELEPHONE ENCOUNTER
Prior Authorization Retail Medication Request    Medication/Dose: Qnasl NS  ICD code (if different than what is on RX):    Previously Tried and Failed:  Flonase  Rationale:  Pt is stable on the use of Qnasl NS, Because she has an intolerance to the preservative present in the other sprays. Qnasl does not have a preservative. She cannot use Flunisolide, Fluticasone, Mometasone or Triamcinolone spray.    Insurance Name:  [phone]  Insurance ID:  [id number]      Pharmacy Information (if different than what is on RX)  Name:  [facility] [address]  Phone:  [phone]

## 2018-09-06 NOTE — TELEPHONE ENCOUNTER
PA Initiation    Medication: Qnasl NS  Insurance Company: Other (see comments)Comment:  [facility] [phone], fax# [phone]  Pharmacy Filling the Rx: [id number] [place] - [address] [facility] [facility] [address]  Filling Pharmacy Phone: [phone]  Filling Pharmacy Fax:    Start Date: 9/6/2018    Central Prior Authorization Team   Phone: [phone]

## 2018-09-10 NOTE — TELEPHONE ENCOUNTER
Prior Authorization Approval    Authorization Effective Date: 9/6/2018  Authorization Expiration Date: 9/6/2019  Medication: Qnasl NS  Approved Dose/Quantity:    Reference #:     Insurance Company: Other (see comments)Comment:  [facility] [phone], fax# [phone]  Expected CoPay:       CoPay Card Available:      Foundation Assistance Needed:    Which Pharmacy is filling the prescription (Not needed for infusion/clinic administered): [id number] - [place], [place] - [address] [facility]  Pharmacy Notified: Yes  Patient Notified: Yes

## 2019-01-14 ENCOUNTER — HOSPITAL ENCOUNTER (OUTPATIENT)
Facility: CLINIC | Age: 59
Setting detail: SPECIMEN
Discharge: HOME | End: 2019-01-14
Attending: INTERNAL MEDICINE | Admitting: INTERNAL MEDICINE
Payer: COMMERCIAL

## 2019-01-14 ENCOUNTER — INFUSION THERAPY VISIT (OUTPATIENT)
Dept: INFUSION THERAPY | Facility: CLINIC | Age: 59
End: 2019-01-14
Attending: INTERNAL MEDICINE
Payer: COMMERCIAL

## 2019-01-14 ENCOUNTER — TELEPHONE (OUTPATIENT)
Dept: ONCOLOGY | Facility: CLINIC | Age: 59
End: 2019-01-14

## 2019-01-14 DIAGNOSIS — Z85.3 PERSONAL HISTORY OF MALIGNANT NEOPLASM OF BREAST: ICD-10-CM

## 2019-01-14 LAB
ALBUMIN SERPL-MCNC: 3.7 G/DL (ref 3.4–5)
ALP SERPL-CCNC: 57 U/L (ref 40–150)
ALT SERPL W P-5'-P-CCNC: 30 U/L (ref 0–50)
ANION GAP SERPL CALCULATED.3IONS-SCNC: 9 MMOL/L (ref 3–14)
AST SERPL W P-5'-P-CCNC: 29 U/L (ref 0–45)
BASOPHILS # BLD AUTO: 0.1 10E9/L (ref 0–0.2)
BASOPHILS NFR BLD AUTO: 1.3 %
BILIRUB SERPL-MCNC: 0.4 MG/DL (ref 0.2–1.3)
BUN SERPL-MCNC: 12 MG/DL (ref 7–30)
CALCIUM SERPL-MCNC: 8.7 MG/DL (ref 8.5–10.1)
CANCER AG27-29 SERPL-ACNC: 34 U/ML (ref 0–39)
CHLORIDE SERPL-SCNC: 107 MMOL/L (ref 94–109)
CO2 SERPL-SCNC: 26 MMOL/L (ref 20–32)
CREAT SERPL-MCNC: 0.65 MG/DL (ref 0.52–1.04)
DIFFERENTIAL METHOD BLD: ABNORMAL
EOSINOPHIL # BLD AUTO: 0.1 10E9/L (ref 0–0.7)
EOSINOPHIL NFR BLD AUTO: 2.3 %
ERYTHROCYTE [DISTWIDTH] IN BLOOD BY AUTOMATED COUNT: 14.7 % (ref 10–15)
GFR SERPL CREATININE-BSD FRML MDRD: >90 ML/MIN/{1.73_M2}
GLUCOSE SERPL-MCNC: 99 MG/DL (ref 70–99)
HCT VFR BLD AUTO: 38.6 % (ref 35–47)
HGB BLD-MCNC: 12.8 G/DL (ref 11.7–15.7)
IMM GRANULOCYTES # BLD: 0 10E9/L (ref 0–0.4)
IMM GRANULOCYTES NFR BLD: 0.3 %
LYMPHOCYTES # BLD AUTO: 1.7 10E9/L (ref 0.8–5.3)
LYMPHOCYTES NFR BLD AUTO: 42.4 %
MCH RBC QN AUTO: 30.8 PG (ref 26.5–33)
MCHC RBC AUTO-ENTMCNC: 33.2 G/DL (ref 31.5–36.5)
MCV RBC AUTO: 93 FL (ref 78–100)
MONOCYTES # BLD AUTO: 0.3 10E9/L (ref 0–1.3)
MONOCYTES NFR BLD AUTO: 8 %
NEUTROPHILS # BLD AUTO: 1.8 10E9/L (ref 1.6–8.3)
NEUTROPHILS NFR BLD AUTO: 45.7 %
NRBC # BLD AUTO: 0 10*3/UL
NRBC BLD AUTO-RTO: 0 /100
PLATELET # BLD AUTO: 218 10E9/L (ref 150–450)
POTASSIUM SERPL-SCNC: 3.7 MMOL/L (ref 3.4–5.3)
PROT SERPL-MCNC: 7.1 G/DL (ref 6.8–8.8)
RBC # BLD AUTO: 4.16 10E12/L (ref 3.8–5.2)
SODIUM SERPL-SCNC: 142 MMOL/L (ref 133–144)
WBC # BLD AUTO: 3.9 10E9/L (ref 4–11)

## 2019-01-14 PROCEDURE — 36415 COLL VENOUS BLD VENIPUNCTURE: CPT

## 2019-01-14 PROCEDURE — 85025 COMPLETE CBC W/AUTO DIFF WBC: CPT | Performed by: INTERNAL MEDICINE

## 2019-01-14 PROCEDURE — 80053 COMPREHEN METABOLIC PANEL: CPT | Performed by: INTERNAL MEDICINE

## 2019-01-14 PROCEDURE — 86300 IMMUNOASSAY TUMOR CA 15-3: CPT | Performed by: INTERNAL MEDICINE

## 2019-01-14 NOTE — PROGRESS NOTES
Patient here for blood draw  Labs drawn:  See released labs  Peripheral: right hand gauge, 23 needle type  Denies concerns or issues that need to be addressed prior to appt with MD Ines Fields RN

## 2019-01-14 NOTE — TELEPHONE ENCOUNTER
Patient called with her normal lab results. Patient was given phone number of pre-authorization to make sure her new insurance covers Dr. Mejia. Ines Fields RN,BSN,OCN

## 2019-01-22 DIAGNOSIS — C50.919 BREAST CANCER (H): ICD-10-CM

## 2019-01-22 RX ORDER — TAMOXIFEN CITRATE 20 MG/1
20 TABLET ORAL DAILY
Qty: 90 TABLET | Refills: 3 | Status: SHIPPED | OUTPATIENT
Start: 2019-01-22 | End: 2019-03-05

## 2019-02-15 ENCOUNTER — OFFICE VISIT (OUTPATIENT)
Dept: URGENT CARE | Facility: URGENT CARE | Age: 59
End: 2019-02-15
Payer: COMMERCIAL

## 2019-02-15 ENCOUNTER — ANCILLARY PROCEDURE (OUTPATIENT)
Dept: GENERAL RADIOLOGY | Facility: CLINIC | Age: 59
End: 2019-02-15
Attending: PHYSICIAN ASSISTANT
Payer: COMMERCIAL

## 2019-02-15 VITALS
RESPIRATION RATE: 16 BRPM | DIASTOLIC BLOOD PRESSURE: 78 MMHG | OXYGEN SATURATION: 98 % | TEMPERATURE: 99.6 F | HEART RATE: 100 BPM | SYSTOLIC BLOOD PRESSURE: 128 MMHG

## 2019-02-15 DIAGNOSIS — J10.1 INFLUENZA A: Primary | ICD-10-CM

## 2019-02-15 DIAGNOSIS — R05.9 COUGH: ICD-10-CM

## 2019-02-15 DIAGNOSIS — J20.9 ACUTE BRONCHITIS, UNSPECIFIED ORGANISM: ICD-10-CM

## 2019-02-15 DIAGNOSIS — R07.0 THROAT PAIN: ICD-10-CM

## 2019-02-15 DIAGNOSIS — R68.83 CHILLS: ICD-10-CM

## 2019-02-15 LAB
DEPRECATED S PYO AG THROAT QL EIA: NORMAL
FLUAV+FLUBV AG SPEC QL: NEGATIVE
FLUAV+FLUBV AG SPEC QL: POSITIVE
SPECIMEN SOURCE: ABNORMAL
SPECIMEN SOURCE: NORMAL

## 2019-02-15 PROCEDURE — 99214 OFFICE O/P EST MOD 30 MIN: CPT | Performed by: PHYSICIAN ASSISTANT

## 2019-02-15 PROCEDURE — 71046 X-RAY EXAM CHEST 2 VIEWS: CPT

## 2019-02-15 PROCEDURE — 87798 DETECT AGENT NOS DNA AMP: CPT | Performed by: PHYSICIAN ASSISTANT

## 2019-02-15 PROCEDURE — 87880 STREP A ASSAY W/OPTIC: CPT | Performed by: PHYSICIAN ASSISTANT

## 2019-02-15 PROCEDURE — 87804 INFLUENZA ASSAY W/OPTIC: CPT | Performed by: PHYSICIAN ASSISTANT

## 2019-02-15 PROCEDURE — 87081 CULTURE SCREEN ONLY: CPT | Performed by: PHYSICIAN ASSISTANT

## 2019-02-15 RX ORDER — BENZONATATE 200 MG/1
200 CAPSULE ORAL 3 TIMES DAILY PRN
Qty: 25 CAPSULE | Refills: 0 | Status: SHIPPED | OUTPATIENT
Start: 2019-02-15 | End: 2019-02-22

## 2019-02-15 RX ORDER — PREDNISONE 20 MG/1
20 TABLET ORAL DAILY
Qty: 5 TABLET | Refills: 0 | Status: SHIPPED | OUTPATIENT
Start: 2019-02-15 | End: 2019-05-13

## 2019-02-15 NOTE — PATIENT INSTRUCTIONS
Patient Education     What Is Acute Bronchitis?  Acute bronchitis is when the airways in your lungs (bronchial tubes) become red and swollen (inflamed). It is usually caused by a viral infection. But it can also occur because of a bacteria or allergen. Symptoms include a cough that produces yellow or greenish mucus and can last for days or sometimes weeks.  Inside healthy lungs    Air travels in and out of the lungs through the airways. The linings of these airways produce sticky mucus. This mucus traps particles that enter the lungs. Tiny structures called cilia then sweep the particles out of the airways.     Healthy airway: Airways are normally open. Air moves in and out easily.      Healthy cilia: Tiny, hairlike cilia sweep mucus and particles up and out of the airways.     Lungs with bronchitis  Bronchitis often occurs with a cold or the flu virus. The airways become inflamed (red and swollen). There is a deep hacking cough from the extra mucus. Other symptoms may include:    Wheezing    Chest discomfort    Shortness of breath    Mild fever  A second infection, this time due to bacteria, may then occur. And airways irritated by allergens or smoke are more likely to get infected.        Inflamed airway: Inflammation and extra mucus narrow the airway, causing shortness of breath.      Impaired cilia: Extra mucus impairs cilia, causing congestion and wheezing. Smoking makes the problem worse.     Making a diagnosis  A physical exam, health history, and certain tests help your healthcare provider make the diagnosis.  Health history  Your healthcare provider will ask you about your symptoms.  The exam  Your provider listens to your chest for signs of congestion. He or she may also check your ears, nose, and throat.  Possible tests    A sputum test for bacteria. This requires a sample of mucus from your lungs.    A nasal or throat swab. This tests to see if you have a bacterial infection.    A chest X-ray. This is  done if your healthcare provider thinks you have pneumonia.    Tests to check for an underlying condition. Other tests may be done to check for things such as allergies, asthma, or COPD (chronic obstructive pulmonary disease). You may need to see a specialist for more lung function testing.  Treating a cough  The main treatment for bronchitis is easing symptoms. Avoiding smoke, allergens, and other things that trigger coughing can often help. If the infection is bacterial, you may be given antibiotics. During the illness, it's important to get plenty of sleep. To ease symptoms:    Don t smoke. Also avoid secondhand smoke.    Use a humidifier. Or try breathing in steam from a hot shower. This may help loosen mucus.    Drink a lot of water and juice. They can soothe the throat and may help thin mucus.    Sit up or use extra pillows when in bed. This helps to lessen coughing and congestion.    Ask your provider about using medicine. Ask about using cough medicine, pain and fever medicine, or a decongestant.  Antibiotics  Most cases of bronchitis are caused by cold or flu viruses. They don t need antibiotics to treat them, even if your mucus is thick and green or yellow. Antibiotics don t treat viral illness and antibiotics have not been shown to have any benefit in cases of acute bronchitis. Taking antibiotics when they are not needed increases your risk of getting an infection later that is antibiotic-resistant. Antibiotics can also cause severe cases of diarrhea that require other antibiotics to treat.  It is important that you accept your healthcare provider's opinion to not use antibiotics. Your provider will prescribe antibiotics if the infection is caused by bacteria. If they are prescribed:    Take all of the medicine. Take the medicine until it is used up, even if symptoms have improved. If you don t, the bronchitis may come back.    Take the medicines as directed. For instance, some medicines should be taken  with food.    Ask about side effects. Ask your provider or pharmacist what side effects are common, and what to do about them.  Follow-up care  You should see your provider again in 2 to 3 weeks. By this time, symptoms should have improved. An infection that lasts longer may mean you have a more serious problem.  Prevention    Avoid tobacco smoke. If you smoke, quit. Stay away from smoky places. Ask friends and family not to smoke around you, or in your home or car.    Get checked for allergies.    Ask your provider about getting a yearly flu shot. Also ask about pneumococcal or pneumonia shots.    Wash your hands often. This helps reduce the chance of picking up viruses that cause colds and flu.  Call your healthcare provider if:    Symptoms worsen, or you have new symptoms    Breathing problems worsen or  become severe    Symptoms don t get better within a week, or within 3 days of taking antibiotics   Date Last Reviewed: 2/1/2017 2000-2018 [employer]. [address] [place] [address], [place] [address]. All rights reserved. This information is not intended as a substitute for professional medical care. Always follow your healthcare professional's instructions.

## 2019-02-15 NOTE — PROGRESS NOTES
SUBJECTIVE:   Maryse Goins is a 58 year old female presenting with a chief complaint of   Chief Complaint   Patient presents with     Urgent Care     croupy cough, nausea, muscleache, and tightness in chest for 2 weeks       She is an established patient of [facility].    URI Adult    Cold, cough, runny nose, stuffy nose, chills, headache, body ache, nausea.   Onset of symptoms was 2 week(s) ago. Cough is intermittent.   Course of illness is worsening.    Severity moderate  Current and Associated symptoms: chills, sweats, runny nose, stuffy nose, cough - non-productive, sore throat, facial pain/pressure, headache, body aches and nausea  Treatment measures tried include Tylenol/Ibuprofen and Decongestants.  Predisposing factors include None.        Review of Systems 10 point review of systems performed and is negative except as above and in HPI.      Past Medical History:   Diagnosis Date     Cancer (H)     Left Breast Cancer     Hypertension      Family History   Problem Relation Age of Onset     Colon Cancer Mother 49             Colon Cancer Maternal Aunt 44             Colon Cancer Maternal Uncle              Colon Cancer Maternal Aunt              Other Cancer Maternal Uncle         Stomach cancer     Current Outpatient Medications   Medication Sig Dispense Refill     aspirin 81 MG tablet Take 1 tablet by mouth daily.       Beclomethasone Dipropionate (QNASL) 80 MCG/ACT AERS Nasal Spray Spray 2 sprays into both nostrils daily 8.7 g 11     benzonatate (TESSALON) 200 MG capsule Take 1 capsule (200 mg) by mouth 3 times daily as needed for cough 25 capsule 0     cholecalciferol (VITAMIN D3) 29152 units capsule Take 1 tablet twice weekly 24 capsule 3     lisinopril (PRINIVIL/ZESTRIL) 20 MG tablet Take 1 tablet (20 mg) by mouth daily 90 tablet 3     predniSONE (DELTASONE) 20 MG tablet Take 20 mg by mouth daily. 5 tablet 0     tamoxifen (NOLVADEX) 20 MG tablet Take 1 tablet (20  mg) by mouth daily 90 tablet 3     LORazepam (ATIVAN) 1 MG tablet Take 0.5 tablets (0.5 mg) by mouth every 8 hours as needed for anxiety Take 30 minutes prior to departure.  Do not operate a vehicle after taking this medication (Patient not taking: Reported on 2/15/2019) 10 tablet 1     Social History     Tobacco Use     Smoking status: Former Smoker     Last attempt to quit: 1993     Years since quittin.2     Smokeless tobacco: Never Used     Tobacco comment: 15 yrs ago   Substance Use Topics     Alcohol use: Yes     Alcohol/week: 0.0 oz     Comment: 4-6  drinks weekly       OBJECTIVE  /78   Pulse 100   Temp 99.6  F (37.6  C) (Tympanic)   Resp 16   SpO2 98%     Physical Exam   Constitutional: She is oriented to person, place, and time. She appears well-developed and well-nourished. She appears distressed.   HENT:   Right Ear: External ear normal.   Left Ear: External ear normal.   Mouth/Throat: Oropharyngeal exudate and posterior oropharyngeal erythema present.   Eyes: EOM are normal. Pupils are equal, round, and reactive to light.   Cardiovascular: Normal rate, regular rhythm and normal heart sounds.   Pulmonary/Chest: Effort normal. She has wheezes ([place]ate[place] ).   Musculoskeletal: Normal range of motion.   Neurological: She is alert and oriented to person, place, and time.   Skin: Skin is warm.   Psychiatric: She has a normal mood and affect. Her behavior is normal. Judgment and thought content normal.   Vitals reviewed.      Labs:  Results for orders placed or performed in visit on 02/15/19 (from the past 24 hour(s))   Strep, Rapid Screen   Result Value Ref Range    Specimen Description Throat     Rapid Strep A Screen       NEGATIVE: No Group A streptococcal antigen detected by immunoassay, await culture report.   Beta strep group A culture   Result Value Ref Range    Specimen Description Throat     Culture Micro No beta hemolytic Streptococcus Group A isolated    Influenza A/B antigen    Result Value Ref Range    Influenza A/B Agn Specimen Nasal     Influenza A Positive (A) NEG^Negative    Influenza B Negative NEG^Negative       X-Ray was done, my findings are: No pneumonia.     ASSESSMENT:      ICD-10-CM    1. Influenza A J10.1    2. Acute bronchitis, unspecified organism J20.9 B. pertussis/parapertussis PCR-NP     XR Chest 2 Views     benzonatate (TESSALON) 200 MG capsule     predniSONE (DELTASONE) 20 MG tablet   3. Chills R68.83 Influenza A/B antigen   4. Throat pain R07.0 Strep, Rapid Screen     Beta strep group A culture        Medical Decision Making:    Differential Diagnosis:  URI Adult/Peds:  Acute right otitis media, Acute left otitis media, Croup, Influenza, Pneumonia, Strep pharyngitis, Tonsilitis, Viral pharyngitis and Viral syndrome    Serious Comorbid Conditions:  Hypertension  History of breast cancer.     Rapid strep test: To rule out strep throat.  As posterior oropharynx was erythematous and patient has sore throat.  Chest x-ray: To rule out pneumonia patient has cough, shortness of breath and on auscultation there was bilateral scattered wheeze with few rails..   Influenza A B: To rule out influenza as patient has  chills, night sweats, cough and URI symptoms with body ache.  Bordetella pertussis PCR patient describes the cough: To be croupy cough.  Therefore a  pertussis PCR test is being done to rule out Bordetella pertussis.    PLAN:    Acute bronchitis:  Tylenol, Ibuprofen, Fluids and Rest, Tessalon Perles low-dose steroid     Influenza A: Supportive care and reassurance was provided.  Patient is advised to keep a close eye on her breathing, shortness of breath, and cough symptoms.  If the cough and fever is worsening patient needs to come back to rule out pneumonia.  Patient is also informed that pneumonia is the most common complication of influenza.        Followup:    If not improving or if condition worsens, follow up with your Primary Care Provider    Patient  Instructions       Patient Education     What Is Acute Bronchitis?  Acute bronchitis is when the airways in your lungs (bronchial tubes) become red and swollen (inflamed). It is usually caused by a viral infection. But it can also occur because of a bacteria or allergen. Symptoms include a cough that produces yellow or greenish mucus and can last for days or sometimes weeks.  Inside healthy lungs    Air travels in and out of the lungs through the airways. The linings of these airways produce sticky mucus. This mucus traps particles that enter the lungs. Tiny structures called cilia then sweep the particles out of the airways.     Healthy airway: Airways are normally open. Air moves in and out easily.      Healthy cilia: Tiny, hairlike cilia sweep mucus and particles up and out of the airways.     Lungs with bronchitis  Bronchitis often occurs with a cold or the flu virus. The airways become inflamed (red and swollen). There is a deep hacking cough from the extra mucus. Other symptoms may include:    Wheezing    Chest discomfort    Shortness of breath    Mild fever  A second infection, this time due to bacteria, may then occur. And airways irritated by allergens or smoke are more likely to get infected.        Inflamed airway: Inflammation and extra mucus narrow the airway, causing shortness of breath.      Impaired cilia: Extra mucus impairs cilia, causing congestion and wheezing. Smoking makes the problem worse.     Making a diagnosis  A physical exam, health history, and certain tests help your healthcare provider make the diagnosis.  Health history  Your healthcare provider will ask you about your symptoms.  The exam  Your provider listens to your chest for signs of congestion. He or she may also check your ears, nose, and throat.  Possible tests    A sputum test for bacteria. This requires a sample of mucus from your lungs.    A nasal or throat swab. This tests to see if you have a bacterial infection.    A  chest X-ray. This is done if your healthcare provider thinks you have pneumonia.    Tests to check for an underlying condition. Other tests may be done to check for things such as allergies, asthma, or COPD (chronic obstructive pulmonary disease). You may need to see a specialist for more lung function testing.  Treating a cough  The main treatment for bronchitis is easing symptoms. Avoiding smoke, allergens, and other things that trigger coughing can often help. If the infection is bacterial, you may be given antibiotics. During the illness, it's important to get plenty of sleep. To ease symptoms:    Don t smoke. Also avoid secondhand smoke.    Use a humidifier. Or try breathing in steam from a hot shower. This may help loosen mucus.    Drink a lot of water and juice. They can soothe the throat and may help thin mucus.    Sit up or use extra pillows when in bed. This helps to lessen coughing and congestion.    Ask your provider about using medicine. Ask about using cough medicine, pain and fever medicine, or a decongestant.  Antibiotics  Most cases of bronchitis are caused by cold or flu viruses. They don t need antibiotics to treat them, even if your mucus is thick and green or yellow. Antibiotics don t treat viral illness and antibiotics have not been shown to have any benefit in cases of acute bronchitis. Taking antibiotics when they are not needed increases your risk of getting an infection later that is antibiotic-resistant. Antibiotics can also cause severe cases of diarrhea that require other antibiotics to treat.  It is important that you accept your healthcare provider's opinion to not use antibiotics. Your provider will prescribe antibiotics if the infection is caused by bacteria. If they are prescribed:    Take all of the medicine. Take the medicine until it is used up, even if symptoms have improved. If you don t, the bronchitis may come back.    Take the medicines as directed. For instance, some  medicines should be taken with food.    Ask about side effects. Ask your provider or pharmacist what side effects are common, and what to do about them.  Follow-up care  You should see your provider again in 2 to 3 weeks. By this time, symptoms should have improved. An infection that lasts longer may mean you have a more serious problem.  Prevention    Avoid tobacco smoke. If you smoke, quit. Stay away from smoky places. Ask friends and family not to smoke around you, or in your home or car.    Get checked for allergies.    Ask your provider about getting a yearly flu shot. Also ask about pneumococcal or pneumonia shots.    Wash your hands often. This helps reduce the chance of picking up viruses that cause colds and flu.  Call your healthcare provider if:    Symptoms worsen, or you have new symptoms    Breathing problems worsen or  become severe    Symptoms don t get better within a week, or within 3 days of taking antibiotics   Date Last Reviewed: 2/1/2017 2000-2018 [employer]. [address] [place] [address], [place] [address]. All rights reserved. This information is not intended as a substitute for professional medical care. Always follow your healthcare professional's instructions.

## 2019-02-16 LAB
B PARAPERT DNA SPEC QL NAA+PROBE: NOT DETECTED
B PERT DNA SPEC QL NAA+PROBE: NOT DETECTED
BACTERIA SPEC CULT: NORMAL
BORDETELLA COMMENT: NORMAL
SPECIMEN SOURCE: NORMAL

## 2019-02-20 ENCOUNTER — ANCILLARY PROCEDURE (OUTPATIENT)
Dept: GENERAL RADIOLOGY | Facility: CLINIC | Age: 59
End: 2019-02-20
Attending: FAMILY MEDICINE
Payer: COMMERCIAL

## 2019-02-20 ENCOUNTER — OFFICE VISIT (OUTPATIENT)
Dept: URGENT CARE | Facility: URGENT CARE | Age: 59
End: 2019-02-20
Payer: COMMERCIAL

## 2019-02-20 VITALS
TEMPERATURE: 98.2 F | WEIGHT: 126 LBS | BODY MASS INDEX: 23.05 KG/M2 | DIASTOLIC BLOOD PRESSURE: 96 MMHG | RESPIRATION RATE: 17 BRPM | HEART RATE: 85 BPM | OXYGEN SATURATION: 96 % | SYSTOLIC BLOOD PRESSURE: 155 MMHG

## 2019-02-20 DIAGNOSIS — R05.9 COUGH: ICD-10-CM

## 2019-02-20 DIAGNOSIS — J34.89 NASAL DISCHARGE: ICD-10-CM

## 2019-02-20 DIAGNOSIS — I10 BENIGN ESSENTIAL HYPERTENSION: ICD-10-CM

## 2019-02-20 DIAGNOSIS — J11.1 INFLUENZA: ICD-10-CM

## 2019-02-20 DIAGNOSIS — J18.9 PNEUMONIA OF RIGHT LOWER LOBE DUE TO INFECTIOUS ORGANISM: Primary | ICD-10-CM

## 2019-02-20 LAB
BASOPHILS # BLD AUTO: 0 10E9/L (ref 0–0.2)
BASOPHILS NFR BLD AUTO: 0.3 %
DIFFERENTIAL METHOD BLD: NORMAL
EOSINOPHIL # BLD AUTO: 0 10E9/L (ref 0–0.7)
EOSINOPHIL NFR BLD AUTO: 0 %
ERYTHROCYTE [DISTWIDTH] IN BLOOD BY AUTOMATED COUNT: 13.8 % (ref 10–15)
HCT VFR BLD AUTO: 39.9 % (ref 35–47)
HGB BLD-MCNC: 13.3 G/DL (ref 11.7–15.7)
LYMPHOCYTES # BLD AUTO: 1.7 10E9/L (ref 0.8–5.3)
LYMPHOCYTES NFR BLD AUTO: 21.6 %
MCH RBC QN AUTO: 30.9 PG (ref 26.5–33)
MCHC RBC AUTO-ENTMCNC: 33.3 G/DL (ref 31.5–36.5)
MCV RBC AUTO: 93 FL (ref 78–100)
MONOCYTES # BLD AUTO: 0.8 10E9/L (ref 0–1.3)
MONOCYTES NFR BLD AUTO: 10.5 %
NEUTROPHILS # BLD AUTO: 5.3 10E9/L (ref 1.6–8.3)
NEUTROPHILS NFR BLD AUTO: 67.6 %
PLATELET # BLD AUTO: 207 10E9/L (ref 150–450)
RBC # BLD AUTO: 4.31 10E12/L (ref 3.8–5.2)
WBC # BLD AUTO: 7.8 10E9/L (ref 4–11)

## 2019-02-20 PROCEDURE — 71046 X-RAY EXAM CHEST 2 VIEWS: CPT

## 2019-02-20 PROCEDURE — 36415 COLL VENOUS BLD VENIPUNCTURE: CPT | Performed by: FAMILY MEDICINE

## 2019-02-20 PROCEDURE — 85025 COMPLETE CBC W/AUTO DIFF WBC: CPT | Performed by: FAMILY MEDICINE

## 2019-02-20 PROCEDURE — 99214 OFFICE O/P EST MOD 30 MIN: CPT | Performed by: FAMILY MEDICINE

## 2019-02-20 RX ORDER — BENZONATATE 200 MG/1
200 CAPSULE ORAL 3 TIMES DAILY PRN
Qty: 21 CAPSULE | Refills: 0 | Status: SHIPPED | OUTPATIENT
Start: 2019-02-20 | End: 2019-02-27

## 2019-02-20 RX ORDER — AZITHROMYCIN 250 MG/1
TABLET, FILM COATED ORAL
Qty: 6 TABLET | Refills: 0 | Status: SHIPPED | OUTPATIENT
Start: 2019-02-20 | End: 2019-02-25

## 2019-02-20 NOTE — PROGRESS NOTES
SUBJECTIVE: Maryse Goins is a 58 year old female presenting with a chief complaint of nasal congestion and cough .  Onset of symptoms was 1 week(s) ago.  Course of illness is worsening.    Severity moderate  Current and Associated symptoms: runny nose, stuffy nose and cough - productive  Treatment measures tried include OTC, no Tamiflu but did get shot for flu.  Predisposing factors include None.    Past Medical History:   Diagnosis Date     Cancer (H)     Left Breast Cancer     Hypertension      Allergies   Allergen Reactions     Sulfa Drugs      Itching, swelling, irritation     Diagnostic X-Ray Materials Itching and Rash     Metrizamide Itching and Rash     Social History     Tobacco Use     Smoking status: Former Smoker     Last attempt to quit: 1993     Years since quittin.2     Smokeless tobacco: Never Used     Tobacco comment: 15 yrs ago   Substance Use Topics     Alcohol use: Yes     Alcohol/week: 0.0 oz     Comment: 4-6  drinks weekly       ROS:  SKIN: no rash  GI: no vomiting    OBJECTIVE:  BP (!) 155/96   Pulse 85   Temp 98.2  F (36.8  C) (Oral)   Resp 17   Wt 57.2 kg (126 lb)   SpO2 96%   BMI 23.05 kg/m  GENERAL APPEARANCE: healthy, alert and no distress  EYES: EOMI,  PERRL, conjunctiva clear  HENT: ear canals and TM's normal.  Nose and mouth without ulcers, erythema or lesions  NECK: supple, nontender, no lymphadenopathy  RESP: rhonchi R lower posterior  CV: regular rates and rhythm, normal S1 S2, no murmur noted  SKIN: no suspicious lesions or rashes  No edema    Xray without acute findings, no pneumonia on CXR read by Raj Martin D.O.      ICD-10-CM    1. Pneumonia of right lower lobe due to infectious organism (H) J18.1 azithromycin (ZITHROMAX) 250 MG tablet   2. Influenza J11.1 CBC with platelets differential     XR Chest 2 Views   3. Cough R05 CBC with platelets differential     XR Chest 2 Views     benzonatate (TESSALON) 200 MG capsule   4. Nasal discharge J34.89 CBC  with platelets differential   5. Benign essential hypertension I10      Fluids/Rest, f/u if worse/not any better

## 2019-02-25 ENCOUNTER — ALLIED HEALTH/NURSE VISIT (OUTPATIENT)
Dept: NURSING | Facility: CLINIC | Age: 59
End: 2019-02-25
Payer: COMMERCIAL

## 2019-02-25 VITALS — DIASTOLIC BLOOD PRESSURE: 83 MMHG | SYSTOLIC BLOOD PRESSURE: 131 MMHG | HEART RATE: 62 BPM

## 2019-02-25 DIAGNOSIS — I10 BENIGN ESSENTIAL HYPERTENSION: Primary | ICD-10-CM

## 2019-02-25 PROCEDURE — 99207 ZZC NO CHARGE NURSE ONLY: CPT

## 2019-02-25 NOTE — Clinical Note
Patient came in today to recheck BP from 2/20/19, patient would like to know if she has to come back and get rechecked or come in for a OV w/you. Please advise. Geri Byrd CMA on 2/25/2019 at 12:06 PM

## 2019-02-25 NOTE — NURSING NOTE
Maryse Goins is a 58 year old year old patient who comes in today for a Blood Pressure check because of high BP when she came in on 2/20/19.  Vital Signs as repeated by UMM Byrd CMA on 2/25/2019 at 12:03 PM   Patient is taking medication as prescribed  Patient is tolerating medications well.  Patient is not monitoring Blood Pressure at home.  Average readings if yes are   Current complaints: none  Disposition:

## 2019-02-28 NOTE — TELEPHONE ENCOUNTER
Attempted to call pt re: medical hx. There is no indication in her chart re: breast ca or where those records or diagnosis may have come from.  No recent imaging indicating this in  or [facility].

## 2019-02-28 NOTE — TELEPHONE ENCOUNTER
RECORDS STATUS - ALL OTHER DIAGNOSIS      RECORDS RECEIVED FROM: Epic    DATE RECEIVED: February 28, 2019     NOTES STATUS DETAILS   OFFICE NOTE from referring provider     OFFICE NOTE from medical oncologist Epic     DISCHARGE SUMMARY from hospital Epic     DISCHARGE REPORT from the ER [facility]     OPERATIVE REPORT     MEDICATION LIST [facility]     CLINICAL TRIAL TREATMENTS TO DATE     LABS     PATHOLOGY REPORTS [facility] from 12/10/10 no recent Path.    ANYTHING RELATED TO DIAGNOSIS     GENONOMIC TESTING     TYPE: Tumor marker - Epic     IMAGING (NEED IMAGES & REPORT)     CT SCANS     MRI [facility]     MAMMO     ULTRASOUND     PET [facility]

## 2019-03-05 ENCOUNTER — PRE VISIT (OUTPATIENT)
Dept: ONCOLOGY | Facility: CLINIC | Age: 59
End: 2019-03-05

## 2019-03-05 ENCOUNTER — ONCOLOGY VISIT (OUTPATIENT)
Dept: ONCOLOGY | Facility: CLINIC | Age: 59
End: 2019-03-05
Attending: INTERNAL MEDICINE
Payer: COMMERCIAL

## 2019-03-05 VITALS
OXYGEN SATURATION: 99 % | HEART RATE: 66 BPM | BODY MASS INDEX: 23.55 KG/M2 | DIASTOLIC BLOOD PRESSURE: 83 MMHG | WEIGHT: 128 LBS | RESPIRATION RATE: 18 BRPM | TEMPERATURE: 97.9 F | HEIGHT: 62 IN | SYSTOLIC BLOOD PRESSURE: 124 MMHG

## 2019-03-05 DIAGNOSIS — C50.412 MALIGNANT NEOPLASM OF UPPER-OUTER QUADRANT OF LEFT BREAST IN FEMALE, ESTROGEN RECEPTOR POSITIVE (H): Primary | ICD-10-CM

## 2019-03-05 DIAGNOSIS — Z17.0 MALIGNANT NEOPLASM OF UPPER-OUTER QUADRANT OF LEFT BREAST IN FEMALE, ESTROGEN RECEPTOR POSITIVE (H): Primary | ICD-10-CM

## 2019-03-05 PROCEDURE — 99214 OFFICE O/P EST MOD 30 MIN: CPT | Performed by: INTERNAL MEDICINE

## 2019-03-05 PROCEDURE — G0463 HOSPITAL OUTPT CLINIC VISIT: HCPCS

## 2019-03-05 RX ORDER — TAMOXIFEN CITRATE 20 MG/1
20 TABLET ORAL DAILY
Qty: 90 TABLET | Refills: 3 | Status: SHIPPED | OUTPATIENT
Start: 2019-03-05 | End: 2019-03-25

## 2019-03-05 ASSESSMENT — MIFFLIN-ST. JEOR: SCORE: 1113.85

## 2019-03-05 ASSESSMENT — PAIN SCALES - GENERAL: PAINLEVEL: NO PAIN (0)

## 2019-03-05 NOTE — PROGRESS NOTES
"Oncology Rooming Note    March 5, 2019 8:13 AM   Maryse Goins is a 58 year old female who presents for:    Chief Complaint   Patient presents with     Oncology Clinic Visit     Malignant neoplasm of upper-outer quadrant of left breast in female, estrogen receptor positive (H)     Initial Vitals: /83 (BP Location: Right arm, Patient Position: Sitting, Cuff Size: Adult Regular)   Pulse 66   Temp 97.9  F (36.6  C) (Oral)   Resp 18   Ht 1.575 m (5' 2\")   Wt 58.1 kg (128 lb)   SpO2 99%   BMI 23.41 kg/m   Estimated body mass index is 23.41 kg/m  as calculated from the following:    Height as of this encounter: 1.575 m (5' 2\").    Weight as of this encounter: 58.1 kg (128 lb). Body surface area is 1.59 meters squared.  No Pain (0) Comment: Data Unavailable   No LMP recorded. Patient has had a hysterectomy.  Allergies reviewed: Yes  Medications reviewed: Yes    Medications: MEDICATION REFILLS NEEDED TODAY. Provider was notified. Refill TAMOXIFEN  Pharmacy name entered into [facility]:    [id number] - [place] - [address]  [id number] - [place], [place] - [address]  [facility] #[id number] - [place] - [address]    Clinical concerns: no          Selena Corona MA              "

## 2019-03-05 NOTE — LETTER
3/5/2019         RE: Maryse Goins  [address]  [facility] [address]        Dear Colleague,    Thank you for referring your patient, Maryse Goins, to the [facility]. Please see a copy of my visit note below.    [facility]    Hematology/Oncology Established Patient Follow-up Note      Today's Date: 03/05/19    Reason for Follow-up: left breast cancer    HISTORY OF PRESENT ILLNESS: Maryse Goins is a 58 year old female with PMHx of HTN, external hemorrhoidectomy, who presents with history of left breast cancer.  She was a patient of Dr. Mejia's.  She was diagnosed with left breast cancer, infiltrating ductal cancer, grade 3, ER/FL positive, HER-2 negative, 8/14 positive nodes, s/p modified left radical mastectomy in 03/2006, s/p adjuvant chemotherapy with dose-dense Adriamycin and cyclophosphamide followed by Taxol, completed in 08/2006.  She completed adjuvant radiation in 10/2006.  She started Arimidex in 09/2006.  She was not willing to stop it.  It was continued to 2013 when 10 years of tamoxifen data came out.  It was changed to tamoxifen in 12/2013.       She had a prophylactic right mastectomy in 12/2009 and immediate reconstruction.  The left side is a TRAM flap and the right side is an implant.      Final pathology from the right breast showed proliferative fibrocystic changes, radical scar, sclerosing adenosis, sclerosed fibroadenoma.      She had a hysterectomy and bilateral salpingo-oophorectomy in 04/2007.    She has strong family history of colon cancer on the maternal side.  She tested negative for BRCA1 and BRCA2.      She is .  She works in [employer].  She has twin sons and a daughter.        INTERIM HISTORY: Maryse is here for follow-up today.  She says that she is doing very well.  She has seen Dr. Mejia for many years, but her insurance doesn't cover her anymore.  She continues to take tamoxifen.  She denies any side effects  from it.  She occasionally gets leg cramps.  She denies any new lumps/bumps or new pains.        REVIEW OF SYSTEMS:   14 point ROS was reviewed and is negative other than as noted above in HPI.       HOME MEDICATIONS:  Current Outpatient Medications   Medication Sig Dispense Refill     aspirin 81 MG tablet Take 1 tablet by mouth daily.       Beclomethasone Dipropionate (QNASL) 80 MCG/ACT AERS Nasal Spray Spray 2 sprays into both nostrils daily 8.7 g 11     cholecalciferol (VITAMIN D3) 34287 units capsule Take 1 tablet twice weekly 24 capsule 3     lisinopril (PRINIVIL/ZESTRIL) 20 MG tablet Take 1 tablet (20 mg) by mouth daily 90 tablet 3     LORazepam (ATIVAN) 1 MG tablet Take 0.5 tablets (0.5 mg) by mouth every 8 hours as needed for anxiety Take 30 minutes prior to departure.  Do not operate a vehicle after taking this medication 10 tablet 1     tamoxifen (NOLVADEX) 20 MG tablet Take 1 tablet (20 mg) by mouth daily 90 tablet 3     predniSONE (DELTASONE) 20 MG tablet Take 20 mg by mouth daily. (Patient not taking: Reported on 2/20/2019.) 5 tablet 0         ALLERGIES:  Allergies   Allergen Reactions     Sulfa Drugs      Itching, swelling, irritation     Diagnostic X-Ray Materials Itching and Rash     Metrizamide Itching and Rash         PAST MEDICAL HISTORY:  Past Medical History:   Diagnosis Date     Cancer (H) 2007    Left Breast Cancer     Hypertension          PAST SURGICAL HISTORY:  Past Surgical History:   Procedure Laterality Date     CHOLECYSTECTOMY       COLONOSCOPY N/A 6/9/2017    Procedure: COLONOSCOPY;  colonoscopy / gastroscopy;  Surgeon: Trev Burks MD;  Location:  GI     complete hysterectomy      pap no longer needed     ESOPHAGOSCOPY, GASTROSCOPY, DUODENOSCOPY (EGD), COMBINED N/A 6/9/2017    Procedure: COMBINED ESOPHAGOSCOPY, GASTROSCOPY, DUODENOSCOPY (EGD), BIOPSY SINGLE OR MULTIPLE;;  Surgeon: Trev Burks MD;  Location:  GI     HEMORRHOIDECTOMY EXTERNAL N/A 11/9/2017     Procedure: HEMORRHOIDECTOMY EXTERNAL;  HEMORRHOIDECTOMY EXTERNAL;  Surgeon: Madina Augustin MD;  Location: [facility]     HYSTERECTOMY, PAP NO LONGER INDICATED      vaginal     MASTECTOMY MODIFIED RADICAL BILATERAL       MASTECTOMY, BILATERAL           SOCIAL HISTORY:  Social History     Socioeconomic History     Marital status:      Spouse name: Not on file     Number of children: Not on file     Years of education: Not on file     Highest education level: Not on file   Occupational History     Not on file   Social Needs     Financial resource strain: Not on file     Food insecurity:     Worry: Not on file     Inability: Not on file     Transportation needs:     Medical: Not on file     Non-medical: Not on file   Tobacco Use     Smoking status: Former Smoker     Last attempt to quit: 1993     Years since quittin.3     Smokeless tobacco: Never Used     Tobacco comment: 15 yrs ago   Substance and Sexual Activity     Alcohol use: Yes     Alcohol/week: 0.0 oz     Comment: 4-6  drinks weekly     Drug use: No     Sexual activity: Not Currently     Partners: Male   Lifestyle     Physical activity:     Days per week: Not on file     Minutes per session: Not on file     Stress: Not on file   Relationships     Social connections:     Talks on phone: Not on file     Gets together: Not on file     Attends Jain service: Not on file     Active member of club or organization: Not on file     Attends meetings of clubs or organizations: Not on file     Relationship status: Not on file     Intimate partner violence:     Fear of current or ex partner: Not on file     Emotionally abused: Not on file     Physically abused: Not on file     Forced sexual activity: Not on file   Other Topics Concern     Parent/sibling w/ CABG, MI or angioplasty before 65F 55M? Not Asked   Social History Narrative     Not on file         FAMILY HISTORY:  Family History   Problem Relation Age of Onset     Colon Cancer Mother 49         "     Colon Cancer Maternal Aunt 44             Colon Cancer Maternal Uncle              Colon Cancer Maternal Aunt              Other Cancer Maternal Uncle         Stomach cancer         PHYSICAL EXAM:  Vital signs:  /83 (BP Location: Right arm, Patient Position: Sitting, Cuff Size: Adult Regular)   Pulse 66   Temp 97.9  F (36.6  C) (Oral)   Resp 18   Ht 1.575 m (5' 2\")   Wt 58.1 kg (128 lb)   SpO2 99%   BMI 23.41 kg/m      ECO  GENERAL/CONSTITUTIONAL: No acute distress.  EYES: No scleral icterus.  LYMPH: No anterior cervical, posterior cervical, supraclavicular, axillary or inguinal adenopathy.   RESPIRATORY: Clear to auscultation bilaterally. No crackles or wheezing.   CARDIOVASCULAR: Regular rate and rhythm without murmurs, gallops, or rubs.  GASTROINTESTINAL: No tenderness. The patient has normal bowel sounds. No guarding.  No distention.  BREAST: s/p bilateral mastectomies and reconstruction.  MUSCULOSKELETAL: Warm and well-perfused, no cyanosis, clubbing, or edema.  NEUROLOGIC: Alert, oriented, answers questions appropriately.  INTEGUMENTARY: No jaundice.  GAIT: Steady, does not use assistive device      LABS:  CBC RESULTS:   Recent Labs   Lab Test 19  0939   WBC 7.8   RBC 4.31   HGB 13.3   HCT 39.9   MCV 93   MCH 30.9   MCHC 33.3   RDW 13.8          Recent Labs   Lab Test 19  0855 18  1041    138   POTASSIUM 3.7 4.6   CHLORIDE 107 103   CO2 26 27   ANIONGAP 9 8   GLC 99 91   BUN 12 14   CR 0.65 0.72   DANNIE 8.7 9.4       ASSESSMENT/PLAN:  Maryse Goins is a 58 year old female with:    1) History of left breast cancer: infiltrating ductal cancer, grade 3, ER/IL positive, HER-2 negative,  positive nodes, s/p modified left radical mastectomy in 2006, s/p adjuvant chemotherapy with dose-dense Adriamycin and cyclophosphamide followed by Taxol, completed in 2006.  She completed adjuvant radiation in 10/2006.  She started " "Arimidex in 2006.  It was continued to  and then changed to tamoxifen in 2013.   -she plans to be on tamoxifen for 10 years  -we discussed potential side effects.  She is tolerating tamoxifen very well.  She already had MOR/BSO.  We discussed the small risk of blood clots.  She is very active.  She will make sure to walk around and stretch her legs every couple of hours when she travels.    -return to clinic in 1 year    2) Family history of colon cancer: Her mother  of colon cancer at age 52.  One of her mother's half sisters, one full sister, and one full brother (all her mother's siblings) also  of colon cancer.  Another maternal uncle had gastric cancer.  She started having colonoscopies at age 37 and a polyp was detected.  She underwent colonoscopy every 2 years since then and never had any polyps since then.    -She follows up with Dr. Augustin.  She was recommended by Dr. Augustin to undergo colonoscopy every 3 years.    -next colonoscopy to be in 2020    I spent a total of 25 minutes with the patient, with over >50% of the time in counseling and/or coordination of care.       Kait Peck MD  Hematology/Oncology  [facility]      Oncology Rooming Note    2019 8:13 AM   Maryse Goins is a 58 year old female who presents for:    Chief Complaint   Patient presents with     Oncology Clinic Visit     Malignant neoplasm of upper-outer quadrant of left breast in female, estrogen receptor positive (H)     Initial Vitals: /83 (BP Location: Right arm, Patient Position: Sitting, Cuff Size: Adult Regular)   Pulse 66   Temp 97.9  F (36.6  C) (Oral)   Resp 18   Ht 1.575 m (5' 2\")   Wt 58.1 kg (128 lb)   SpO2 99%   BMI 23.41 kg/m    Estimated body mass index is 23.41 kg/m  as calculated from the following:    Height as of this encounter: 1.575 m (5' 2\").    Weight as of this encounter: 58.1 kg (128 lb). Body surface area is 1.59 meters squared.  No " Pain (0) Comment: Data Unavailable   No LMP recorded. Patient has had a hysterectomy.  Allergies reviewed: Yes  Medications reviewed: Yes    Medications: MEDICATION REFILLS NEEDED TODAY. Provider was notified. Refill TAMOXIFEN  Pharmacy name entered into [facility]:    [id number] - [place], [place] - [address]  [id number] - [place], [place] - [address]  [facility][id number] - [place], [place] - [address] [facility]    Clinical concerns: no          Selena Corona MA                Again, thank you for allowing me to participate in the care of your patient.        Sincerely,        Kait Peck MD

## 2019-03-05 NOTE — PROGRESS NOTES
[facility]    Hematology/Oncology Established Patient Follow-up Note      Today's Date: 03/05/19    Reason for Follow-up: left breast cancer    HISTORY OF PRESENT ILLNESS: Maryse Goins is a 58 year old female with PMHx of HTN, external hemorrhoidectomy, who presents with history of left breast cancer.  She was a patient of Dr. Mejia's.  She was diagnosed with left breast cancer, infiltrating ductal cancer, grade 3, ER/NC positive, HER-2 negative, 8/14 positive nodes, s/p modified left radical mastectomy in 03/2006, s/p adjuvant chemotherapy with dose-dense Adriamycin and cyclophosphamide followed by Taxol, completed in 08/2006.  She completed adjuvant radiation in 10/2006.  She started Arimidex in 09/2006.  She was not willing to stop it.  It was continued to 2013 when 10 years of tamoxifen data came out.  It was changed to tamoxifen in 12/2013.       She had a prophylactic right mastectomy in 12/2009 and immediate reconstruction.  The left side is a TRAM flap and the right side is an implant.      Final pathology from the right breast showed proliferative fibrocystic changes, radical scar, sclerosing adenosis, sclerosed fibroadenoma.      She had a hysterectomy and bilateral salpingo-oophorectomy in 04/2007.    She has strong family history of colon cancer on the maternal side.  She tested negative for BRCA1 and BRCA2.      She is .  She works in [place].  She has twin sons and a daughter.        INTERIM HISTORY: Maryse is here for follow-up today.  She says that she is doing very well.  She has seen Dr. Mejia for many years, but her insurance doesn't cover her anymore.  She continues to take tamoxifen.  She denies any side effects from it.  She occasionally gets leg cramps.  She denies any new lumps/bumps or new pains.        REVIEW OF SYSTEMS:   14 point ROS was reviewed and is negative other than as noted above in HPI.       HOME MEDICATIONS:  Current Outpatient Medications    Medication Sig Dispense Refill     aspirin 81 MG tablet Take 1 tablet by mouth daily.       Beclomethasone Dipropionate (QNASL) 80 MCG/ACT AERS Nasal Spray Spray 2 sprays into both nostrils daily 8.7 g 11     cholecalciferol (VITAMIN D3) 68486 units capsule Take 1 tablet twice weekly 24 capsule 3     lisinopril (PRINIVIL/ZESTRIL) 20 MG tablet Take 1 tablet (20 mg) by mouth daily 90 tablet 3     LORazepam (ATIVAN) 1 MG tablet Take 0.5 tablets (0.5 mg) by mouth every 8 hours as needed for anxiety Take 30 minutes prior to departure.  Do not operate a vehicle after taking this medication 10 tablet 1     tamoxifen (NOLVADEX) 20 MG tablet Take 1 tablet (20 mg) by mouth daily 90 tablet 3     predniSONE (DELTASONE) 20 MG tablet Take 20 mg by mouth daily. (Patient not taking: Reported on 2/20/2019.) 5 tablet 0         ALLERGIES:  Allergies   Allergen Reactions     Sulfa Drugs      Itching, swelling, irritation     Diagnostic X-Ray Materials Itching and Rash     Metrizamide Itching and Rash         PAST MEDICAL HISTORY:  Past Medical History:   Diagnosis Date     Cancer (H) 2007    Left Breast Cancer     Hypertension          PAST SURGICAL HISTORY:  Past Surgical History:   Procedure Laterality Date     CHOLECYSTECTOMY       COLONOSCOPY N/A 6/9/2017    Procedure: COLONOSCOPY;  colonoscopy / gastroscopy;  Surgeon: Trev Burks MD;  Location:  GI     complete hysterectomy      pap no longer needed     ESOPHAGOSCOPY, GASTROSCOPY, DUODENOSCOPY (EGD), COMBINED N/A 6/9/2017    Procedure: COMBINED ESOPHAGOSCOPY, GASTROSCOPY, DUODENOSCOPY (EGD), BIOPSY SINGLE OR MULTIPLE;;  Surgeon: Trev Burks MD;  Location:  GI     HEMORRHOIDECTOMY EXTERNAL N/A 11/9/2017    Procedure: HEMORRHOIDECTOMY EXTERNAL;  HEMORRHOIDECTOMY EXTERNAL;  Surgeon: Madina Augustin MD;  Location:  OR     HYSTERECTOMY, PAP NO LONGER INDICATED      vaginal     MASTECTOMY MODIFIED RADICAL BILATERAL       MASTECTOMY, BILATERAL            SOCIAL HISTORY:  Social History     Socioeconomic History     Marital status:      Spouse name: Not on file     Number of children: Not on file     Years of education: Not on file     Highest education level: Not on file   Occupational History     Not on file   Social Needs     Financial resource strain: Not on file     Food insecurity:     Worry: Not on file     Inability: Not on file     Transportation needs:     Medical: Not on file     Non-medical: Not on file   Tobacco Use     Smoking status: Former Smoker     Last attempt to quit: 1993     Years since quittin.3     Smokeless tobacco: Never Used     Tobacco comment: 15 yrs ago   Substance and Sexual Activity     Alcohol use: Yes     Alcohol/week: 0.0 oz     Comment: 4-6  drinks weekly     Drug use: No     Sexual activity: Not Currently     Partners: Male   Lifestyle     Physical activity:     Days per week: Not on file     Minutes per session: Not on file     Stress: Not on file   Relationships     Social connections:     Talks on phone: Not on file     Gets together: Not on file     Attends Caodaism service: Not on file     Active member of club or organization: Not on file     Attends meetings of clubs or organizations: Not on file     Relationship status: Not on file     Intimate partner violence:     Fear of current or ex partner: Not on file     Emotionally abused: Not on file     Physically abused: Not on file     Forced sexual activity: Not on file   Other Topics Concern     Parent/sibling w/ CABG, MI or angioplasty before 65F 55M? Not Asked   Social History Narrative     Not on file         FAMILY HISTORY:  Family History   Problem Relation Age of Onset     Colon Cancer Mother 49             Colon Cancer Maternal Aunt 44             Colon Cancer Maternal Uncle              Colon Cancer Maternal Aunt              Other Cancer Maternal Uncle         Stomach cancer         PHYSICAL EXAM:  Vital  "signs:  /83 (BP Location: Right arm, Patient Position: Sitting, Cuff Size: Adult Regular)   Pulse 66   Temp 97.9  F (36.6  C) (Oral)   Resp 18   Ht 1.575 m (5' 2\")   Wt 58.1 kg (128 lb)   SpO2 99%   BMI 23.41 kg/m     ECO  GENERAL/CONSTITUTIONAL: No acute distress.  EYES: No scleral icterus.  LYMPH: No anterior cervical, posterior cervical, supraclavicular, axillary or inguinal adenopathy.   RESPIRATORY: Clear to auscultation bilaterally. No crackles or wheezing.   CARDIOVASCULAR: Regular rate and rhythm without murmurs, gallops, or rubs.  GASTROINTESTINAL: No tenderness. The patient has normal bowel sounds. No guarding.  No distention.  BREAST: s/p bilateral mastectomies and reconstruction.  MUSCULOSKELETAL: Warm and well-perfused, no cyanosis, clubbing, or edema.  NEUROLOGIC: Alert, oriented, answers questions appropriately.  INTEGUMENTARY: No jaundice.  GAIT: Steady, does not use assistive device      LABS:  CBC RESULTS:   Recent Labs   Lab Test 19  0939   WBC 7.8   RBC 4.31   HGB 13.3   HCT 39.9   MCV 93   MCH 30.9   MCHC 33.3   RDW 13.8          Recent Labs   Lab Test 19  0855 18  1041    138   POTASSIUM 3.7 4.6   CHLORIDE 107 103   CO2 26 27   ANIONGAP 9 8   GLC 99 91   BUN 12 14   CR 0.65 0.72   DANNIE 8.7 9.4       ASSESSMENT/PLAN:  Maryse Goins is a 58 year old female with:    1) History of left breast cancer: infiltrating ductal cancer, grade 3, ER/ND positive, HER-2 negative, 8/ positive nodes, s/p modified left radical mastectomy in 2006, s/p adjuvant chemotherapy with dose-dense Adriamycin and cyclophosphamide followed by Taxol, completed in 2006.  She completed adjuvant radiation in 10/2006.  She started Arimidex in 2006.  It was continued to  and then changed to tamoxifen in 2013.   -she plans to be on tamoxifen for 10 years  -we discussed potential side effects.  She is tolerating tamoxifen very well.  She already had MOR/BSO.  We " discussed the small risk of blood clots.  She is very active.  She will make sure to walk around and stretch her legs every couple of hours when she travels.    -return to clinic in 1 year    2) Family history of colon cancer: Her mother  of colon cancer at age 52.  One of her mother's half sisters, one full sister, and one full brother (all her mother's siblings) also  of colon cancer.  Another maternal uncle had gastric cancer.  She started having colonoscopies at age 37 and a polyp was detected.  She underwent colonoscopy every 2 years since then and never had any polyps since then.    -She follows up with Dr. Augustin.  She was recommended by Dr. Augustin to undergo colonoscopy every 3 years.    -next colonoscopy to be in 2020    I spent a total of 25 minutes with the patient, with over >50% of the time in counseling and/or coordination of care.       Kait Peck MD  Hematology/Oncology  [facility]

## 2019-03-25 ENCOUNTER — TELEPHONE (OUTPATIENT)
Dept: ONCOLOGY | Facility: CLINIC | Age: 59
End: 2019-03-25

## 2019-03-25 DIAGNOSIS — Z17.0 MALIGNANT NEOPLASM OF UPPER-OUTER QUADRANT OF LEFT BREAST IN FEMALE, ESTROGEN RECEPTOR POSITIVE (H): Primary | ICD-10-CM

## 2019-03-25 DIAGNOSIS — C50.412 MALIGNANT NEOPLASM OF UPPER-OUTER QUADRANT OF LEFT BREAST IN FEMALE, ESTROGEN RECEPTOR POSITIVE (H): Primary | ICD-10-CM

## 2019-03-25 RX ORDER — TAMOXIFEN CITRATE 20 MG/1
20 TABLET ORAL DAILY
Qty: 90 TABLET | Refills: 3 | Status: SHIPPED | OUTPATIENT
Start: 2019-03-25 | End: 2020-03-13

## 2019-03-25 RX ORDER — TAMOXIFEN CITRATE 20 MG/1
20 TABLET ORAL DAILY
Qty: 90 TABLET | Refills: 3 | Status: CANCELLED | OUTPATIENT
Start: 2019-03-25

## 2019-03-25 NOTE — TELEPHONE ENCOUNTER
Need exact location that pt needs refill sent spoke to Dustin to confrim the [facility] he was calling from and where to send rx.

## 2019-03-25 NOTE — TELEPHONE ENCOUNTER
Dustin from [facility] in [facility] called stating patient is out of tamoxifen and recent prescription was sent to wrong pharmacy and then cancelled. Needs Tamoxifen 20mg refilled

## 2019-05-13 ENCOUNTER — E-VISIT (OUTPATIENT)
Dept: FAMILY MEDICINE | Facility: CLINIC | Age: 59
End: 2019-05-13
Payer: COMMERCIAL

## 2019-05-13 ENCOUNTER — MYC MEDICAL ADVICE (OUTPATIENT)
Dept: FAMILY MEDICINE | Facility: CLINIC | Age: 59
End: 2019-05-13

## 2019-05-13 DIAGNOSIS — Z23 NEED FOR VACCINATION: Primary | ICD-10-CM

## 2019-05-13 PROCEDURE — 99444 ZZC PHYSICIAN ONLINE EVALUATION & MANAGEMENT SERVICE: CPT | Performed by: FAMILY MEDICINE

## 2019-05-14 ENCOUNTER — ALLIED HEALTH/NURSE VISIT (OUTPATIENT)
Dept: NURSING | Facility: CLINIC | Age: 59
End: 2019-05-14
Payer: COMMERCIAL

## 2019-05-14 DIAGNOSIS — Z23 NEED FOR VACCINATION: Primary | ICD-10-CM

## 2019-05-14 PROCEDURE — 90471 IMMUNIZATION ADMIN: CPT

## 2019-05-14 PROCEDURE — 90707 MMR VACCINE SC: CPT

## 2019-05-14 PROCEDURE — 99207 ZZC NO CHARGE LOS: CPT

## 2019-05-14 NOTE — PROGRESS NOTES
Prior to injection, verified patient identity using patient's name and date of birth.  Due to injection administration, patient instructed to remain in clinic for 15 minutes  afterwards, and to report any adverse reaction to me immediately.    Screening Questionnaire for Adult Immunization    Are you sick today?   No   Do you have allergies to medications, food, a vaccine component or latex?   Yes   Have you ever had a serious reaction after receiving a vaccination?   No   Do you have a long-term health problem with heart disease, lung disease, asthma, kidney disease, metabolic disease (e.g. diabetes), anemia, or other blood disorder?   No   Do you have cancer, leukemia, HIV/AIDS, or any other immune system problem?   No   In the past 3 months, have you taken medications that affect  your immune system, such as prednisone, other steroids, or anticancer drugs; drugs for the treatment of rheumatoid arthritis, Crohn s disease, or psoriasis; or have you had radiation treatments?   Yes   Have you had a seizure, or a brain or other nervous system problem?   No   During the past year, have you received a transfusion of blood or blood     products, or been given immune (gamma) globulin or antiviral drug?   No   For women: Are you pregnant or is there a chance you could become        pregnant during the next month?   No   Have you received any vaccinations in the past 4 weeks?   No     Immunization questionnaire was positive for at least one answer.  Notified Dr. Montanez.        Per orders of Dr. العراقي, injection of MMR given by Alberto Leonard. Patient instructed to remain in clinic for 15 minutes afterwards, and to report any adverse reaction to me immediately.       Screening performed by Alberto Leonard on 5/14/2019 at 1:06 PM.

## 2019-08-12 DIAGNOSIS — R79.89 LOW VITAMIN D LEVEL: ICD-10-CM

## 2019-08-16 DIAGNOSIS — I10 BENIGN ESSENTIAL HYPERTENSION: ICD-10-CM

## 2019-08-16 RX ORDER — LISINOPRIL 20 MG/1
TABLET ORAL
Qty: 30 TABLET | Refills: 0 | Status: SHIPPED | OUTPATIENT
Start: 2019-08-16 | End: 2019-09-13

## 2019-08-16 NOTE — TELEPHONE ENCOUNTER
Medication is being filled for 1 time refill only due to:  Patient needs to be seen because due for physical.   Radha ALVARES RN

## 2019-09-13 ENCOUNTER — OFFICE VISIT (OUTPATIENT)
Dept: FAMILY MEDICINE | Facility: CLINIC | Age: 59
End: 2019-09-13
Payer: COMMERCIAL

## 2019-09-13 VITALS
WEIGHT: 129.1 LBS | BODY MASS INDEX: 23.76 KG/M2 | HEIGHT: 62 IN | HEART RATE: 71 BPM | SYSTOLIC BLOOD PRESSURE: 134 MMHG | OXYGEN SATURATION: 97 % | RESPIRATION RATE: 12 BRPM | DIASTOLIC BLOOD PRESSURE: 82 MMHG | TEMPERATURE: 98.1 F

## 2019-09-13 DIAGNOSIS — Z23 NEED FOR VACCINATION: ICD-10-CM

## 2019-09-13 DIAGNOSIS — F40.243 FLYING PHOBIA: ICD-10-CM

## 2019-09-13 DIAGNOSIS — Z00.00 ROUTINE GENERAL MEDICAL EXAMINATION AT A HEALTH CARE FACILITY: Primary | ICD-10-CM

## 2019-09-13 DIAGNOSIS — I10 BENIGN ESSENTIAL HYPERTENSION: ICD-10-CM

## 2019-09-13 DIAGNOSIS — C50.412 MALIGNANT NEOPLASM OF UPPER-OUTER QUADRANT OF LEFT BREAST IN FEMALE, ESTROGEN RECEPTOR POSITIVE (H): ICD-10-CM

## 2019-09-13 DIAGNOSIS — Z17.0 MALIGNANT NEOPLASM OF UPPER-OUTER QUADRANT OF LEFT BREAST IN FEMALE, ESTROGEN RECEPTOR POSITIVE (H): ICD-10-CM

## 2019-09-13 DIAGNOSIS — M16.11 OSTEOARTHRITIS OF RIGHT HIP, UNSPECIFIED OSTEOARTHRITIS TYPE: ICD-10-CM

## 2019-09-13 PROCEDURE — 90471 IMMUNIZATION ADMIN: CPT | Performed by: FAMILY MEDICINE

## 2019-09-13 PROCEDURE — 90686 IIV4 VACC NO PRSV 0.5 ML IM: CPT | Performed by: FAMILY MEDICINE

## 2019-09-13 PROCEDURE — 99214 OFFICE O/P EST MOD 30 MIN: CPT | Mod: 25 | Performed by: FAMILY MEDICINE

## 2019-09-13 PROCEDURE — 99396 PREV VISIT EST AGE 40-64: CPT | Mod: 25 | Performed by: FAMILY MEDICINE

## 2019-09-13 RX ORDER — LISINOPRIL 20 MG/1
20 TABLET ORAL DAILY
Qty: 90 TABLET | Refills: 3 | Status: SHIPPED | OUTPATIENT
Start: 2019-09-13 | End: 2020-09-11

## 2019-09-13 RX ORDER — LORAZEPAM 1 MG/1
0.5 TABLET ORAL EVERY 8 HOURS PRN
Qty: 10 TABLET | Refills: 1 | Status: SHIPPED | OUTPATIENT
Start: 2019-09-13 | End: 2021-03-15

## 2019-09-13 ASSESSMENT — MIFFLIN-ST. JEOR: SCORE: 1113.84

## 2019-09-13 NOTE — PROGRESS NOTES
SUBJECTIVE:   CC: Maryse Goins is an 59 year old woman who presents for preventive health visit.     Healthy Habits:     Getting at least 3 servings of Calcium per day:  Yes    Bi-annual eye exam:  Yes    Dental care twice a year:  NO    Sleep apnea or symptoms of sleep apnea:  None    Diet:  Regular (no restrictions)    Frequency of exercise:  4-5 days/week    Duration of exercise:  45-60 minutes    Taking medications regularly:  Yes    Medication side effects:  None    PHQ-2 Total Score: 0    Additional concerns today:  No    in addition to health maintenance patient would like to discuss the following problems:    Getting some pain in R lateral hip and groin sometimes  Couple weeks ago could hardly walk due to pain in R hip  Aleve helps  Scheduled for cortisone shot in her hip  Also is having problems with her knee  Prior history of related problems: no prior problems with this area in the past.  Treatment:   Work related: no  Problem pertinent review of systems  Skin: no rash or infection  Neuro: no significant weakness, numbness, tingling.    Pertinent EXAM  Normal R & L lower extremity joints for ROM symmetry & tone/ no tenderness/ no effusions/ no crepitus or deformities, except:  Pain in groin worse with active internal rotation  Neurological exam reveals normal without focal findings. DTR's, motor strength and sensation normal.    A/Plan: Osteoarthritis R hip  Discussed follow up and cortisone injection      Also follow up of familial polyposis  Colonoscopy 6/9/17    Hx breast cancer, long term remission      Patient has been having problems with situational anxiety related to flying  Onset: Many years  Course: Treated appropriately with short acting benzodiazepines    Current symptoms are described by the PHQ9/CLINTON below.  No flowsheet data found.  No flowsheet data found.    No known medical causes of depression or Anxiety.    Problem pert ROS:  Neuro: Normal; no tremor  Psych: No suicidal  thoughts or plan. No halluciations.  Psych exam:  GROOMING: Adequately groomed.  ATTIRE: Appropriate.  AGE: Appears stated.  BEHAVIOR TOWARDS EXAMINER: Cooperative.  MOTOR ACTIVITY: Unremarkable.  EYE CONTACT: Appropriate.  Mood:  good  Affect:  appropriate and in normal range  Discussed a refill of limited use benzodiazepines for situational anxiety  Reviewed risks and benefits of medications and other treatment options.  Discussed potential for impairment and dependence with benzo medications, a limited Rx prescribed        The 10-year ASCVD risk score (Scooter PFEIFFER Jr., et al., 2013) is: 3.8%    Values used to calculate the score:      Age: 59 years      Sex: Female      Is Non- : No      Diabetic: No      Tobacco smoker: No      Systolic Blood Pressure: 134 mmHg      Is BP treated: Yes      HDL Cholesterol: 72 mg/dL      Total Cholesterol: 215 mg/dL      Today's PHQ-2 Score:   PHQ-2 (  [employer]) 2019   Q1: Little interest or pleasure in doing things 0   Q2: Feeling down, depressed or hopeless 0   PHQ-2 Score 0   Q1: Little interest or pleasure in doing things Not at all   Q2: Feeling down, depressed or hopeless Not at all   PHQ-2 Score 0       Abuse: Current or Past(Physical, Sexual or Emotional)- No  Do you feel safe in your environment? Yes    Social History     Tobacco Use     Smoking status: Former Smoker     Last attempt to quit: 1993     Years since quittin.8     Smokeless tobacco: Never Used     Tobacco comment: 15 yrs ago   Substance Use Topics     Alcohol use: Yes     Alcohol/week: 0.0 oz     Comment: 4-6  drinks weekly     If you drink alcohol do you typically have >3 drinks per day or >7 drinks per week? No    Alcohol Use 2019   Prescreen: >3 drinks/day or >7 drinks/week? No   Prescreen: >3 drinks/day or >7 drinks/week? -   No flowsheet data found.    Reviewed orders with patient.  Reviewed health maintenance and updated orders accordingly - Yes  Lab work is in  process  Labs reviewed in EPIC  BP Readings from Last 3 Encounters:   19 134/82   19 124/83   19 131/83    Wt Readings from Last 3 Encounters:   19 58.6 kg (129 lb 1.6 oz)   19 58.1 kg (128 lb)   19 57.2 kg (126 lb)                  Patient Active Problem List   Diagnosis     Malignant neoplasm of upper-outer quadrant of left breast in female, estrogen receptor positive (H)     Benign essential hypertension     Post-nasal drip     Flying phobia     Familial polyposis of colon     Family history of colon cancer     Osteoarthritis of right hip, unspecified osteoarthritis type     Past Surgical History:   Procedure Laterality Date     CHOLECYSTECTOMY       COLONOSCOPY N/A 2017    Procedure: COLONOSCOPY;  colonoscopy / gastroscopy;  Surgeon: Trev Burks MD;  Location:  GI     complete hysterectomy      pap no longer needed     ESOPHAGOSCOPY, GASTROSCOPY, DUODENOSCOPY (EGD), COMBINED N/A 2017    Procedure: COMBINED ESOPHAGOSCOPY, GASTROSCOPY, DUODENOSCOPY (EGD), BIOPSY SINGLE OR MULTIPLE;;  Surgeon: Trev Burks MD;  Location:  GI     HEMORRHOIDECTOMY EXTERNAL N/A 2017    Procedure: HEMORRHOIDECTOMY EXTERNAL;  HEMORRHOIDECTOMY EXTERNAL;  Surgeon: Madina Augustin MD;  Location:  OR     HYSTERECTOMY, PAP NO LONGER INDICATED      vaginal     MASTECTOMY MODIFIED RADICAL BILATERAL       MASTECTOMY, BILATERAL         Social History     Tobacco Use     Smoking status: Former Smoker     Last attempt to quit: 1993     Years since quittin.8     Smokeless tobacco: Never Used     Tobacco comment: 15 yrs ago   Substance Use Topics     Alcohol use: Yes     Alcohol/week: 0.0 oz     Comment: 4-6  drinks weekly     Family History   Problem Relation Age of Onset     Colon Cancer Mother 49             Colon Cancer Maternal Aunt 44             Colon Cancer Maternal Uncle              Colon Cancer Maternal Aunt               Other Cancer Maternal Uncle         Stomach cancer         Current Outpatient Medications   Medication Sig Dispense Refill     aspirin 81 MG tablet Take 1 tablet by mouth daily.       Beclomethasone Dipropionate (QNASL) 80 MCG/ACT AERS Nasal Spray Spray 2 sprays into both nostrils daily 8.7 g 11     lisinopril (PRINIVIL/ZESTRIL) 20 MG tablet Take 1 tablet (20 mg) by mouth daily 90 tablet 3     LORazepam (ATIVAN) 1 MG tablet Take 0.5 tablets (0.5 mg) by mouth every 8 hours as needed for anxiety Take 30 minutes prior to departure.  Do not operate a vehicle after taking this medication 10 tablet 1     tamoxifen (NOLVADEX) 20 MG tablet Take 1 tablet (20 mg) by mouth daily 90 tablet 3     vitamin D3 (CHOLECALCIFEROL) 48827 units capsule Take 1 tablet twice weekly 24 capsule 3     Allergies   Allergen Reactions     Sulfa Drugs      Itching, swelling, irritation     Diagnostic X-Ray Materials Itching and Rash     Metrizamide Itching and Rash       Alternate mammogram schedule due to breast cancer history     Pertinent mammograms are reviewed under the imaging tab.  History of abnormal Pap smear: NO - age 30-65 PAP every 5 years with negative HPV co-testing recommended     Reviewed and updated as needed this visit by clinical staff  Tobacco  Allergies  Meds         Reviewed and updated as needed this visit by Provider            Review of Systems  CONSTITUTIONAL: NEGATIVE for fever, chills, change in weight  INTEGUMENTARY/SKIN: NEGATIVE for worrisome rashes, moles or lesions  EYES: NEGATIVE for vision changes or irritation  ENT: NEGATIVE for ear, mouth and throat problems  RESP: NEGATIVE for significant cough or SOB  BREAST: NEGATIVE for masses, tenderness or discharge  CV: NEGATIVE for chest pain, palpitations or peripheral edema  GI: NEGATIVE for nausea, abdominal pain, heartburn, or change in bowel habits  : NEGATIVE for unusual urinary or vaginal symptoms. No vaginal bleeding.  MUSCULOSKELETAL: NEGATIVE for  "significant arthralgias or myalgia  NEURO: NEGATIVE for weakness, dizziness or paresthesias  PSYCHIATRIC: NEGATIVE for changes in mood or affect      OBJECTIVE:   /82   Pulse 71   Temp 98.1  F (36.7  C) (Oral)   Resp 12   Ht 1.575 m (5' 2\")   Wt 58.6 kg (129 lb 1.6 oz)   SpO2 97%   BMI 23.61 kg/m    Physical Exam    General Appearance: Pleasant, alert, in no acute respiratory distress.  Head Exam: Normal. Normocephalic, atraumatic. No sinus tenderness.  Eye Exam: Normal external eye, conjunctiva, lids. TONG.  Ear Exam: Normal auditory canals and external ears. Non-tender.  Left TM-normal. Right TM-normal.  OroPharynx Exam: Dental hygiene adequate. Normal buccal mucosa. Normal pharynx.  Neck Exam: Supple, no masses or enlarged, tender nodes.  Thyroid Exam: No nodules or enlargement or tenderness.  Chest/Respiratory Exam: Normal, comfortable, easy respirations. Chest wall normal. Lungs are clear to auscultation. No wheezing, crackles, or rhonchi.  Cardiovascular Exam: Regular rate and rhythm. No murmur, gallop, or rubs. No pedal edema.  Gastrointestinal Exam: Soft, non-tender, no masses or organomegaly.  Musculoskeletal Exam: Back is non-tender, full ROM of upper and lower extremities.  Except as above  Skin: no rash, warm and dry.    Neurologic Exam: Nonfocal, no tremor. Normal gait.  Psychiatric Exam: Alert - appropriate, normal affect      ASSESSMENT/PLAN:       ICD-10-CM    1. Routine general medical examination at a health care facility Z00.00    2. Benign essential hypertension I10 lisinopril (PRINIVIL/ZESTRIL) 20 MG tablet   3. Malignant neoplasm of upper-outer quadrant of left breast in female, estrogen receptor positive (H) C50.412     Z17.0    4. Need for vaccination Z23 HC FLU VAC PRESRV FREE QUAD SPLIT VIR 3+YRS IM   5. Osteoarthritis of right hip, unspecified osteoarthritis type M16.11    6. Flying phobia F40.243 LORazepam (ATIVAN) 1 MG tablet       COUNSELING:  Reviewed preventive health " "counseling, as reflected in patient instructions       Regular exercise       Healthy diet/nutrition    Estimated body mass index is 23.61 kg/m  as calculated from the following:    Height as of this encounter: 1.575 m (5' 2\").    Weight as of this encounter: 58.6 kg (129 lb 1.6 oz).       reports that she quit smoking about 25 years ago. She has never used smokeless tobacco.      Counseling Resources:  ATP IV Guidelines  Pooled Cohorts Equation Calculator  Breast Cancer Risk Calculator  FRAX Risk Assessment  ICSI Preventive Guidelines  Dietary Guidelines for Americans, 2010  USDA's MyPlate  ASA Prophylaxis  Lung CA Screening    Gomez Jovany العراقي MD  [facility]  "

## 2019-09-13 NOTE — NURSING NOTE
"Chief Complaint   Patient presents with     Physical     /82   Pulse 71   Temp 98.1  F (36.7  C) (Oral)   Resp 12   Ht 1.575 m (5' 2\")   Wt 58.6 kg (129 lb 1.6 oz)   SpO2 97%   BMI 23.61 kg/m   Estimated body mass index is 23.61 kg/m  as calculated from the following:    Height as of this encounter: 1.575 m (5' 2\").    Weight as of this encounter: 58.6 kg (129 lb 1.6 oz).  bp completed using cuff size: regular      Health Maintenance addressed:  NONE    n/a    Angeles Armendariz MA    "

## 2019-09-13 NOTE — NURSING NOTE
Prior to immunization administration, verified patients identity using patient s name and date of birth. Please see Immunization Activity for additional information.     Screening Questionnaire for Adult Immunization    Are you sick today?   No   Do you have allergies to medications, food, a vaccine component or latex?   No   Have you ever had a serious reaction after receiving a vaccination?   No   Do you have a long-term health problem with heart disease, lung disease, asthma, kidney disease, metabolic disease (e.g. diabetes), anemia, or other blood disorder?   No   Do you have cancer, leukemia, HIV/AIDS, or any other immune system problem?   No   In the past 3 months, have you taken medications that affect  your immune system, such as prednisone, other steroids, or anticancer drugs; drugs for the treatment of rheumatoid arthritis, Crohn s disease, or psoriasis; or have you had radiation treatments?   No   Have you had a seizure, or a brain or other nervous system problem?   No   During the past year, have you received a transfusion of blood or blood     products, or been given immune (gamma) globulin or antiviral drug?   No   For women: Are you pregnant or is there a chance you could become        pregnant during the next month?   No   Have you received any vaccinations in the past 4 weeks?   No     Immunization questionnaire answers were all negative.        Per orders of Dr. العراقي, injection of Flu  given by Rayna Joy CMA. Patient instructed to remain in clinic for 15 minutes afterwards, and to report any adverse reaction to me immediately.  Rayna Joy CMA on 9/13/2019 at 3:01 PM         Screening performed by Rayna Joy CMA on 9/13/2019 at 3:00 PM.

## 2019-09-14 DIAGNOSIS — I10 BENIGN ESSENTIAL HYPERTENSION: ICD-10-CM

## 2019-09-14 NOTE — TELEPHONE ENCOUNTER
"Requested Prescriptions   Pending Prescriptions Disp Refills     lisinopril (PRINIVIL/ZESTRIL) 20 MG tablet [Pharmacy Med Name: LISINOPRIL 20 MG TABLET]  Last Written Prescription Date:  09/13/2019  Last Fill Quantity: 90 tablet,  # refills: 3   Last Office Visit: 9/13/2019   Future Office Visit:   30 tablet 0     Sig: TAKE 1 TABLET BY MOUTH EVERY DAY       ACE Inhibitors (Including Combos) Protocol Passed - 9/14/2019 12:27 AM        Passed - Blood pressure under 140/90 in past 12 months     BP Readings from Last 3 Encounters:   09/13/19 134/82   03/05/19 124/83   02/25/19 131/83           Passed - Recent (12 mo) or future (30 days) visit within the authorizing provider's specialty     Patient had office visit in the last 12 months or has a visit in the next 30 days with authorizing provider or within the authorizing provider's specialty.  See \"Patient Info\" tab in inbasket, or \"Choose Columns\" in Meds & Orders section of the refill encounter.          Passed - Medication is active on med list        Passed - Patient is age 18 or older        Passed - No active pregnancy on record        Passed - Normal serum creatinine on file in past 12 months     Recent Labs   Lab Test 01/14/19  0855   CR 0.65           Passed - Normal serum potassium on file in past 12 months     Recent Labs   Lab Test 01/14/19  0855   POTASSIUM 3.7           Passed - No positive pregnancy test within past 12 months          "

## 2019-09-16 ENCOUNTER — TRANSFERRED RECORDS (OUTPATIENT)
Dept: HEALTH INFORMATION MANAGEMENT | Facility: CLINIC | Age: 59
End: 2019-09-16

## 2019-09-16 RX ORDER — LISINOPRIL 20 MG/1
TABLET ORAL
Start: 2019-09-16

## 2019-09-27 ENCOUNTER — MYC REFILL (OUTPATIENT)
Dept: FAMILY MEDICINE | Facility: CLINIC | Age: 59
End: 2019-09-27

## 2019-09-27 DIAGNOSIS — I10 BENIGN ESSENTIAL HYPERTENSION: ICD-10-CM

## 2019-09-27 RX ORDER — LISINOPRIL 20 MG/1
20 TABLET ORAL DAILY
Qty: 90 TABLET | Refills: 3 | Status: CANCELLED | OUTPATIENT
Start: 2019-09-27

## 2019-09-30 NOTE — TELEPHONE ENCOUNTER
"See MyChart to pt   Should have refills left  Radha ALVARES RN    Last Written Prescription Date:  9/13/2019  Last Fill Quantity: 90,  # refills: 3   Last office visit: 9/13/2019 with prescribing provider:     Future Office Visit:    Requested Prescriptions   Pending Prescriptions Disp Refills     lisinopril (PRINIVIL/ZESTRIL) 20 MG tablet 90 tablet 3     Sig: Take 1 tablet (20 mg) by mouth daily       ACE Inhibitors (Including Combos) Protocol Passed - 9/27/2019  2:57 PM        Passed - Blood pressure under 140/90 in past 12 months     BP Readings from Last 3 Encounters:   09/13/19 134/82   03/05/19 124/83   02/25/19 131/83                 Passed - Recent (12 mo) or future (30 days) visit within the authorizing provider's specialty     Patient has had an office visit with the authorizing provider or a provider within the authorizing providers department within the previous 12 mos or has a future within next 30 days. See \"Patient Info\" tab in inbasket, or \"Choose Columns\" in Meds & Orders section of the refill encounter.              Passed - Medication is active on med list        Passed - Patient is age 18 or older        Passed - No active pregnancy on record        Passed - Normal serum creatinine on file in past 12 months     Recent Labs   Lab Test 01/14/19  0855   CR 0.65             Passed - Normal serum potassium on file in past 12 months     Recent Labs   Lab Test 01/14/19  0855   POTASSIUM 3.7             Passed - No positive pregnancy test within past 12 months        "

## 2019-10-07 DIAGNOSIS — R09.82 POST-NASAL DRIP: Primary | ICD-10-CM

## 2019-10-07 NOTE — TELEPHONE ENCOUNTER
Requested Prescriptions   Pending Prescriptions Disp Refills     QNASL 80 MCG/ACT Nasal [place] [Pharmacy Med Name: QNASL 80 MCG NASAL.   SPRAY]  Last Written Prescription Date:  8/31/18  Last Fill Quantity: 8.7g,  # refills: 11   Last Office Visit: 9/13/2019   Future Office Visit:       11     Sig: INSTILL 2 SPRAYS INTO BOTH NOSTRILS DAILY       There is no refill protocol information for this order

## 2019-10-08 RX ORDER — BECLOMETHASONE DIPROPIONATE 80 UG/1
AEROSOL, METERED NASAL
Qty: 8.7 G | Refills: 1 | Status: SHIPPED | OUTPATIENT
Start: 2019-10-08 | End: 2020-03-05

## 2019-10-08 NOTE — TELEPHONE ENCOUNTER
JF    Routing refill request to provider for review/approval because:  Drug not on the [facility] refill protocol   Thanks,  Sarah Bean RN

## 2019-10-17 ENCOUNTER — TRANSFERRED RECORDS (OUTPATIENT)
Dept: HEALTH INFORMATION MANAGEMENT | Facility: CLINIC | Age: 59
End: 2019-10-17

## 2019-10-29 ENCOUNTER — TELEPHONE (OUTPATIENT)
Dept: FAMILY MEDICINE | Facility: CLINIC | Age: 59
End: 2019-10-29

## 2019-10-29 NOTE — TELEPHONE ENCOUNTER
Prior Authorization Retail Medication Request    Medication/Dose: QNASL 80 MCG/ACT Nasal Spray  ICD code (if different than what is on RX):    Previously Tried and Failed:  Flonase  Rationale:  Pt is stable on the use of Qnasl NS, because she has an intolerance to the preservative present in other sprays. Qnasl does not have a preservative. She cannot use Flunisolide, Fluticasone, Mometasone or Triamcinolone spray.    Insurance Name:  [phone]  Insurance ID:  [id number]      Pharmacy Information (if different than what is on RX)  Name:  [facility] [address]  Phone:  [phone]

## 2019-10-30 NOTE — TELEPHONE ENCOUNTER
Central Prior Authorization Team   Phone: [phone]      PA Initiation    Medication: QNASL 80 MCG/ACT Nasal Spray - INITIATED  Insurance Company: [facility]  Ph[facility]illing [id number] [place] [facility] [place] - [address]  Filling Pharmacy Phone: [phone]  Filling Pharmacy Fax: [phone]  Start Date: 10/30/2019

## 2019-10-31 NOTE — TELEPHONE ENCOUNTER
Central Prior Authorization Team   Phone: [phone]      Prior Authorization Approval    Authorization Effective Date: 10/30/2019  Authorization Expiration Date: 10/30/2020  Medication: QNASL 80 MCG/ACT Nasal Spray - APPROVED  Approved Dose/Quantity: 8.7 FOR 30  Reference #:     Insurance Company: [employer] [facility] [facility]  Expected CoPay:       CoPay Card Available:      Foundation Assistance Needed:    Which Pharmacy is filling the prescription (Not needed for infusion/clinic administered): [facility] [id number] [place] [address]  Pharmacy Notified: Yes  Patient Notified: Yes (**Instructed pharmacy to notify patient when script is ready to /ship.**)

## 2019-11-03 ENCOUNTER — HEALTH MAINTENANCE LETTER (OUTPATIENT)
Age: 59
End: 2019-11-03

## 2019-11-25 ENCOUNTER — MYC MEDICAL ADVICE (OUTPATIENT)
Dept: FAMILY MEDICINE | Facility: CLINIC | Age: 59
End: 2019-11-25

## 2020-02-15 ENCOUNTER — MYC MEDICAL ADVICE (OUTPATIENT)
Dept: ONCOLOGY | Facility: CLINIC | Age: 60
End: 2020-02-15

## 2020-02-17 NOTE — TELEPHONE ENCOUNTER
Good morning Dr. Cisco Serra did not order any labs. We can always draw them at your visit if she feels you need any labs.

## 2020-03-05 ENCOUNTER — TRANSFERRED RECORDS (OUTPATIENT)
Dept: HEALTH INFORMATION MANAGEMENT | Facility: CLINIC | Age: 60
End: 2020-03-05

## 2020-03-05 DIAGNOSIS — R09.82 POST-NASAL DRIP: ICD-10-CM

## 2020-03-05 NOTE — TELEPHONE ENCOUNTER
Last Written Prescription Date:  10/8/2019   Last Fill Quantity: 8.7g,  # refills: 1   Last office visit: 9/13/2019 with prescribing provider:  Dulce Maria   Future Office Visit:   Next 5 appointments (look out 90 days)    Mar 13, 2020  8:30 AM CDT  Return Visit with Kait Peck MD  [facility] ([facility]) [address]  [facility] [address]  [phone]

## 2020-03-06 NOTE — TELEPHONE ENCOUNTER
Routing refill request to provider for review/approval because:  Drug not on the [facility] refill protocol   Radha ALVARES RN

## 2020-03-07 ENCOUNTER — TRANSFERRED RECORDS (OUTPATIENT)
Dept: HEALTH INFORMATION MANAGEMENT | Facility: CLINIC | Age: 60
End: 2020-03-07

## 2020-03-10 RX ORDER — BECLOMETHASONE DIPROPIONATE 80 UG/1
AEROSOL, METERED NASAL
Qty: 8.7 G | Refills: 1 | Status: SHIPPED | OUTPATIENT
Start: 2020-03-10 | End: 2020-06-23

## 2020-03-11 DIAGNOSIS — I10 BENIGN ESSENTIAL HYPERTENSION: ICD-10-CM

## 2020-03-12 RX ORDER — LISINOPRIL 20 MG/1
TABLET ORAL
Start: 2020-03-12

## 2020-03-13 ENCOUNTER — ONCOLOGY VISIT (OUTPATIENT)
Dept: ONCOLOGY | Facility: CLINIC | Age: 60
End: 2020-03-13
Attending: INTERNAL MEDICINE
Payer: COMMERCIAL

## 2020-03-13 VITALS
TEMPERATURE: 98.2 F | HEART RATE: 85 BPM | SYSTOLIC BLOOD PRESSURE: 109 MMHG | RESPIRATION RATE: 18 BRPM | BODY MASS INDEX: 23.59 KG/M2 | DIASTOLIC BLOOD PRESSURE: 74 MMHG | WEIGHT: 128.2 LBS | HEIGHT: 62 IN | OXYGEN SATURATION: 99 %

## 2020-03-13 DIAGNOSIS — R79.89 LOW VITAMIN D LEVEL: ICD-10-CM

## 2020-03-13 DIAGNOSIS — C50.412 MALIGNANT NEOPLASM OF UPPER-OUTER QUADRANT OF LEFT BREAST IN FEMALE, ESTROGEN RECEPTOR POSITIVE (H): ICD-10-CM

## 2020-03-13 DIAGNOSIS — Z17.0 MALIGNANT NEOPLASM OF UPPER-OUTER QUADRANT OF LEFT BREAST IN FEMALE, ESTROGEN RECEPTOR POSITIVE (H): ICD-10-CM

## 2020-03-13 PROCEDURE — 99214 OFFICE O/P EST MOD 30 MIN: CPT | Performed by: INTERNAL MEDICINE

## 2020-03-13 PROCEDURE — G0463 HOSPITAL OUTPT CLINIC VISIT: HCPCS

## 2020-03-13 RX ORDER — TAMOXIFEN CITRATE 20 MG/1
20 TABLET ORAL DAILY
Qty: 90 TABLET | Refills: 3 | Status: SHIPPED | OUTPATIENT
Start: 2020-03-13 | End: 2021-03-10

## 2020-03-13 ASSESSMENT — PAIN SCALES - GENERAL: PAINLEVEL: NO PAIN (0)

## 2020-03-13 ASSESSMENT — MIFFLIN-ST. JEOR: SCORE: 1109.76

## 2020-03-13 NOTE — LETTER
3/13/2020         RE: Maryse Goins  [address]  [facility] [address]        Dear Colleague,    Thank you for referring your patient, Maryse Goins, to the [facility]. Please see a copy of my visit note below.    [facility]    Hematology/Oncology Established Patient Follow-up Note      Today's Date: 03/13/20    Reason for Follow-up: left breast cancer    HISTORY OF PRESENT ILLNESS: Maryse Goins is a 59 year old female with PMHx of HTN, external hemorrhoidectomy, who presents with history of left breast cancer.  She was a patient of Dr. Prabhakar.  She was diagnosed with left breast cancer, infiltrating ductal cancer, grade 3, ER/[place] positive, HER-2 negative, 8/14 positive nodes, s/p modified left radical mastectomy in 03/2006, s/p adjuvant chemotherapy with dose-dense Adriamycin and cyclophosphamide followed by Taxol, completed in 08/2006.  She completed adjuvant radiation in 10/2006.  She started Arimidex in 09/2006.  She was not willing to stop it.  It was continued to 2013 when 10 years of tamoxifen data came out.  It was changed to tamoxifen in 12/2013.       She had a prophylactic right mastectomy in 12/2009 and immediate reconstruction.  The left side is a TRAM flap and the right side is an implant.      Final pathology from the right breast showed proliferative fibrocystic changes, radical scar, sclerosing adenosis, sclerosed fibroadenoma.      She had a hysterectomy and bilateral salpingo-oophorectomy in 04/2007.    She has strong family history of colon cancer on the maternal side.  She tested negative for BRCA1 and BRCA2.      She is .  She works in [employer].  She has twin sons and a daughter.        INTERIM HISTORY: Maryse is here for follow-up today.  She says that she is doing well.  She continues to take tamoxifen without side effects.  She says that she wants to continue taking it because it feels like a security  blanket.        REVIEW OF SYSTEMS:   14 point ROS was reviewed and is negative other than as noted above in HPI.       HOME MEDICATIONS:  Current Outpatient Medications   Medication Sig Dispense Refill     aspirin 81 MG tablet Take 1 tablet by mouth daily.       beclomethasone (QNASL) 80 MCG/ACT nasal aerosol INSTILL 2 SPRAYS INTO BOTH NOSTRILS DAILY 8.7 g 1     lisinopril (PRINIVIL/ZESTRIL) 20 MG tablet Take 1 tablet (20 mg) by mouth daily 90 tablet 3     LORazepam (ATIVAN) 1 MG tablet Take 0.5 tablets (0.5 mg) by mouth every 8 hours as needed for anxiety Take 30 minutes prior to departure.  Do not operate a vehicle after taking this medication 10 tablet 1     tamoxifen (NOLVADEX) 20 MG tablet Take 1 tablet (20 mg) by mouth daily 90 tablet 3     vitamin D3 (CHOLECALCIFEROL) 1.25 MG (33982 UT) capsule Take 1 tablet twice weekly 24 capsule 3         ALLERGIES:  Allergies   Allergen Reactions     Sulfa Drugs      Itching, swelling, irritation     Diagnostic X-Ray Materials Itching and Rash     Metrizamide Itching and Rash         PAST MEDICAL HISTORY:  Past Medical History:   Diagnosis Date     Cancer (H) 2007    Left Breast Cancer     Hypertension          PAST SURGICAL HISTORY:  Past Surgical History:   Procedure Laterality Date     CHOLECYSTECTOMY       COLONOSCOPY N/A 6/9/2017    Procedure: COLONOSCOPY;  colonoscopy / gastroscopy;  Surgeon: Trev Burks MD;  Location:  GI     complete hysterectomy      pap no longer needed     ESOPHAGOSCOPY, GASTROSCOPY, DUODENOSCOPY (EGD), COMBINED N/A 6/9/2017    Procedure: COMBINED ESOPHAGOSCOPY, GASTROSCOPY, DUODENOSCOPY (EGD), BIOPSY SINGLE OR MULTIPLE;;  Surgeon: Trev Burks MD;  Location:  GI     HEMORRHOIDECTOMY EXTERNAL N/A 11/9/2017    Procedure: HEMORRHOIDECTOMY EXTERNAL;  HEMORRHOIDECTOMY EXTERNAL;  Surgeon: Madina Augustin MD;  Location:  OR     HYSTERECTOMY, PAP NO LONGER INDICATED      vaginal     MASTECTOMY MODIFIED RADICAL BILATERAL        MASTECTOMY, BILATERAL           SOCIAL HISTORY:  Social History     Socioeconomic History     Marital status:      Spouse name: Not on file     Number of children: Not on file     Years of education: Not on file     Highest education level: Not on file   Occupational History     Not on file   Social Needs     Financial resource strain: Not on file     Food insecurity     Worry: Not on file     Inability: Not on file     Transportation needs     Medical: Not on file     Non-medical: Not on file   Tobacco Use     Smoking status: Former Smoker     Last attempt to quit: 1993     Years since quittin.3     Smokeless tobacco: Never Used     Tobacco comment: 15 yrs ago   Substance and Sexual Activity     Alcohol use: Yes     Alcohol/week: 0.0 standard drinks     Comment: 4-6  drinks weekly     Drug use: No     Sexual activity: Not Currently     Partners: Male   Lifestyle     Physical activity     Days per week: Not on file     Minutes per session: Not on file     Stress: Not on file   Relationships     Social connections     Talks on phone: Not on file     Gets together: Not on file     Attends Sabianist service: Not on file     Active member of club or organization: Not on file     Attends meetings of clubs or organizations: Not on file     Relationship status: Not on file     Intimate partner violence     Fear of current or ex partner: Not on file     Emotionally abused: Not on file     Physically abused: Not on file     Forced sexual activity: Not on file   Other Topics Concern     Parent/sibling w/ CABG, MI or angioplasty before 65F 55M? Not Asked   Social History Narrative     Not on file         FAMILY HISTORY:  Family History   Problem Relation Age of Onset     Colon Cancer Mother 49             Colon Cancer Maternal Aunt 44             Colon Cancer Maternal Uncle              Colon Cancer Maternal Aunt              Other Cancer Maternal Uncle         Stomach  "cancer         PHYSICAL EXAM:  Vital signs:  /74 (BP Location: Right arm, Patient Position: Sitting, Cuff Size: Adult Regular)   Pulse 85   Temp 98.2  F (36.8  C) (Oral)   Resp 18   Ht 1.575 m (5' 2\")   Wt 58.2 kg (128 lb 3.2 oz)   SpO2 99%   BMI 23.45 kg/m     ECO  GENERAL/CONSTITUTIONAL: No acute distress.  EYES: No scleral icterus.  LYMPH: No anterior cervical, posterior cervical, supraclavicular, axillary or inguinal adenopathy.   RESPIRATORY: Clear to auscultation bilaterally. No crackles or wheezing.   CARDIOVASCULAR: Regular rate and rhythm without murmurs, gallops, or rubs.  GASTROINTESTINAL: No tenderness. The patient has normal bowel sounds. No guarding.  No distention.  BREAST: s/p bilateral mastectomies and reconstruction.  MUSCULOSKELETAL: Warm and well-perfused, no cyanosis, clubbing, or edema.  NEUROLOGIC: Alert, oriented, answers questions appropriately.  INTEGUMENTARY: No jaundice.  GAIT: Steady, does not use assistive device        ASSESSMENT/PLAN:  Maryse Goins is a 59 year old female with:    1) History of left breast cancer: infiltrating ductal cancer, grade 3, ER/[place] positive, HER-2 negative, 8/14 positive nodes, s/p modified left radical mastectomy in 2006, s/p adjuvant chemotherapy with dose-dense Adriamycin and cyclophosphamide followed by Taxol, completed in 2006.  She completed adjuvant radiation in 10/2006.  She started Arimidex in 2006.  It was continued to  and then changed to tamoxifen in 2013.   -she plans to be on tamoxifen for 10 years.  She wants to continue taking it because it feels like a security blanket.  We discussed that there is no data of benefit over the potential side effects to take it beyond 10 years, and she would be willing to stop taking it then.  -we discussed potential side effects.  She is tolerating tamoxifen very well.  She already had MOR/BSO.  We discussed the small risk of blood clots.  She is very active.  She will " "make sure to walk around and stretch her legs every couple of hours when she travels.    -return to clinic in 1 year    2) Family history of colon cancer: Her mother  of colon cancer at age 52.  One of her mother's half sisters, one full sister, and one full brother (all her mother's siblings) also  of colon cancer.  Another maternal uncle had gastric cancer.  She started having colonoscopies at age 37 and a polyp was detected.  She underwent colonoscopy every 2 years since then and never had any polyps since then.    -She follows up with Dr. Augustin.  She was recommended by Dr. Augustin to undergo colonoscopy every 3 years.    -next colonoscopy to be in 2020 - she says that she will call to schedule    I spent a total of 25 minutes with the patient, with over >50% of the time in counseling and/or coordination of care.       Kait Peck MD  Hematology/Oncology  [facility]      Oncology Rooming Note    2020 8:41 AM   Maryse Goins is a 59 year old female who presents for:    Chief Complaint   Patient presents with     Oncology Clinic Visit     Malignant neoplasm of upper-outer quadrant of left breast in female, estrogen receptor positive (H)     Initial Vitals: /74 (BP Location: Right arm, Patient Position: Sitting, Cuff Size: Adult Regular)   Pulse 85   Temp 98.2  F (36.8  C) (Oral)   Resp 18   Ht 1.575 m (5' 2\")   Wt 58.2 kg (128 lb 3.2 oz)   SpO2 99%   BMI 23.45 kg/m   Estimated body mass index is 23.45 kg/m  as calculated from the following:    Height as of this encounter: 1.575 m (5' 2\").    Weight as of this encounter: 58.2 kg (128 lb 3.2 oz). Body surface area is 1.6 meters squared.  No Pain (0) Comment: Data Unavailable   No LMP recorded. Patient has had a hysterectomy.  Allergies reviewed: Yes  Medications reviewed: Yes    Medications: MEDICATION REFILLS NEEDED TODAY. Provider was notified. Refill TAMOXIFEN, CHOLECALCIFEROL  Pharmacy name " entered into EPIC:    [id number] - [place], [place] - [address]  [id number] - [place], [place] - [address]  [id number] - [place], [place] - [address] [facility] [address] [place] [facility] - [place], [place] - [address]    Clinical concerns: no       Selena Corona, [facility]            Again, thank you for allowing me to participate in the care of your patient.        Sincerely,        Kait Peck MD

## 2020-03-13 NOTE — PROGRESS NOTES
[facility]    Hematology/Oncology Established Patient Follow-up Note      Today's Date: 03/13/20    Reason for Follow-up: left breast cancer    HISTORY OF PRESENT ILLNESS: Maryse Goins is a 59 year old female with PMHx of HTN, external hemorrhoidectomy, who presents with history of left breast cancer.  She was a patient of Dr. Prabhakar.  She was diagnosed with left breast cancer, infiltrating ductal cancer, grade 3, ER/[place] positive, HER-2 negative, 8/14 positive nodes, s/p modified left radical mastectomy in 03/2006, s/p adjuvant chemotherapy with dose-dense Adriamycin and cyclophosphamide followed by Taxol, completed in 08/2006.  She completed adjuvant radiation in 10/2006.  She started Arimidex in 09/2006.  She was not willing to stop it.  It was continued to 2013 when 10 years of tamoxifen data came out.  It was changed to tamoxifen in 12/2013.       She had a prophylactic right mastectomy in 12/2009 and immediate reconstruction.  The left side is a TRAM flap and the right side is an implant.      Final pathology from the right breast showed proliferative fibrocystic changes, radical scar, sclerosing adenosis, sclerosed fibroadenoma.      She had a hysterectomy and bilateral salpingo-oophorectomy in 04/2007.    She has strong family history of colon cancer on the maternal side.  She tested negative for BRCA1 and BRCA2.      She is .  She works in [employer].  She has twin sons and a daughter.        INTERIM HISTORY: Maryse is here for follow-up today.  She says that she is doing well.  She continues to take tamoxifen without side effects.  She says that she wants to continue taking it because it feels like a security blanket.        REVIEW OF SYSTEMS:   14 point ROS was reviewed and is negative other than as noted above in HPI.       HOME MEDICATIONS:  Current Outpatient Medications   Medication Sig Dispense Refill     aspirin 81 MG tablet Take 1 tablet by mouth daily.        beclomethasone (QNASL) 80 MCG/ACT nasal aerosol INSTILL 2 SPRAYS INTO BOTH NOSTRILS DAILY 8.7 g 1     lisinopril (PRINIVIL/ZESTRIL) 20 MG tablet Take 1 tablet (20 mg) by mouth daily 90 tablet 3     LORazepam (ATIVAN) 1 MG tablet Take 0.5 tablets (0.5 mg) by mouth every 8 hours as needed for anxiety Take 30 minutes prior to departure.  Do not operate a vehicle after taking this medication 10 tablet 1     tamoxifen (NOLVADEX) 20 MG tablet Take 1 tablet (20 mg) by mouth daily 90 tablet 3     vitamin D3 (CHOLECALCIFEROL) 1.25 MG (15106 UT) capsule Take 1 tablet twice weekly 24 capsule 3         ALLERGIES:  Allergies   Allergen Reactions     Sulfa Drugs      Itching, swelling, irritation     Diagnostic X-Ray Materials Itching and Rash     Metrizamide Itching and Rash         PAST MEDICAL HISTORY:  Past Medical History:   Diagnosis Date     Cancer (H) 2007    Left Breast Cancer     Hypertension          PAST SURGICAL HISTORY:  Past Surgical History:   Procedure Laterality Date     CHOLECYSTECTOMY       COLONOSCOPY N/A 6/9/2017    Procedure: COLONOSCOPY;  colonoscopy / gastroscopy;  Surgeon: Trev Burks MD;  Location:  GI     complete hysterectomy      pap no longer needed     ESOPHAGOSCOPY, GASTROSCOPY, DUODENOSCOPY (EGD), COMBINED N/A 6/9/2017    Procedure: COMBINED ESOPHAGOSCOPY, GASTROSCOPY, DUODENOSCOPY (EGD), BIOPSY SINGLE OR MULTIPLE;;  Surgeon: Trev Burks MD;  Location:  GI     HEMORRHOIDECTOMY EXTERNAL N/A 11/9/2017    Procedure: HEMORRHOIDECTOMY EXTERNAL;  HEMORRHOIDECTOMY EXTERNAL;  Surgeon: Madina Augustin MD;  Location:  OR     HYSTERECTOMY, PAP NO LONGER INDICATED      vaginal     MASTECTOMY MODIFIED RADICAL BILATERAL       MASTECTOMY, BILATERAL           SOCIAL HISTORY:  Social History     Socioeconomic History     Marital status:      Spouse name: Not on file     Number of children: Not on file     Years of education: Not on file     Highest education level: Not on  "file   Occupational History     Not on file   Social Needs     Financial resource strain: Not on file     Food insecurity     Worry: Not on file     Inability: Not on file     Transportation needs     Medical: Not on file     Non-medical: Not on file   Tobacco Use     Smoking status: Former Smoker     Last attempt to quit: 1993     Years since quittin.3     Smokeless tobacco: Never Used     Tobacco comment: 15 yrs ago   Substance and Sexual Activity     Alcohol use: Yes     Alcohol/week: 0.0 standard drinks     Comment: 4-6  drinks weekly     Drug use: No     Sexual activity: Not Currently     Partners: Male   Lifestyle     Physical activity     Days per week: Not on file     Minutes per session: Not on file     Stress: Not on file   Relationships     Social connections     Talks on phone: Not on file     Gets together: Not on file     Attends [employer] service: Not on file     Active member of club or organization: Not on file     Attends meetings of clubs or organizations: Not on file     Relationship status: Not on file     Intimate partner violence     Fear of current or ex partner: Not on file     Emotionally abused: Not on file     Physically abused: Not on file     Forced sexual activity: Not on file   Other Topics Concern     Parent/sibling w/ CABG, MI or angioplasty before 65F 55M? Not Asked   Social History Narrative     Not on file         FAMILY HISTORY:  Family History   Problem Relation Age of Onset     Colon Cancer Mother 49             Colon Cancer Maternal Aunt 44             Colon Cancer Maternal Uncle              Colon Cancer Maternal Aunt              Other Cancer Maternal Uncle         Stomach cancer         PHYSICAL EXAM:  Vital signs:  /74 (BP Location: Right arm, Patient Position: Sitting, Cuff Size: Adult Regular)   Pulse 85   Temp 98.2  F (36.8  C) (Oral)   Resp 18   Ht 1.575 m (5' 2\")   Wt 58.2 kg (128 lb 3.2 oz)   SpO2 99%   BMI " 23.45 kg/m     ECO  GENERAL/CONSTITUTIONAL: No acute distress.  EYES: No scleral icterus.  LYMPH: No anterior cervical, posterior cervical, supraclavicular, axillary or inguinal adenopathy.   RESPIRATORY: Clear to auscultation bilaterally. No crackles or wheezing.   CARDIOVASCULAR: Regular rate and rhythm without murmurs, gallops, or rubs.  GASTROINTESTINAL: No tenderness. The patient has normal bowel sounds. No guarding.  No distention.  BREAST: s/p bilateral mastectomies and reconstruction.  MUSCULOSKELETAL: Warm and well-perfused, no cyanosis, clubbing, or edema.  NEUROLOGIC: Alert, oriented, answers questions appropriately.  INTEGUMENTARY: No jaundice.  GAIT: Steady, does not use assistive device        ASSESSMENT/PLAN:  Maryse Goins is a 59 year old female with:    1) History of left breast cancer: infiltrating ductal cancer, grade 3, ER/CT positive, HER-2 negative, 8/14 positive nodes, s/p modified left radical mastectomy in 2006, s/p adjuvant chemotherapy with dose-dense Adriamycin and cyclophosphamide followed by Taxol, completed in 2006.  She completed adjuvant radiation in 10/2006.  She started Arimidex in 2006.  It was continued to  and then changed to tamoxifen in 2013.   -she plans to be on tamoxifen for 10 years.  She wants to continue taking it because it feels like a security blanket.  We discussed that there is no data of benefit over the potential side effects to take it beyond 10 years, and she would be willing to stop taking it then.  -we discussed potential side effects.  She is tolerating tamoxifen very well.  She already had MOR/BSO.  We discussed the small risk of blood clots.  She is very active.  She will make sure to walk around and stretch her legs every couple of hours when she travels.    -return to clinic in 1 year    2) Family history of colon cancer: Her mother  of colon cancer at age 52.  One of her mother's half sisters, one full sister, and one full  brother (all her mother's siblings) also  of colon cancer.  Another maternal uncle had gastric cancer.  She started having colonoscopies at age 37 and a polyp was detected.  She underwent colonoscopy every 2 years since then and never had any polyps since then.    -She follows up with Dr. Augustin.  She was recommended by Dr. Augustin to undergo colonoscopy every 3 years.    -next colonoscopy to be in 2020 - she says that she will call to schedule    I spent a total of 25 minutes with the patient, with over >50% of the time in counseling and/or coordination of care.       Kait Peck MD  Hematology/Oncology  [facility]

## 2020-03-13 NOTE — PROGRESS NOTES
"Oncology Rooming Note    March 13, 2020 8:41 AM   Maryse Goins is a 59 year old female who presents for:    Chief Complaint   Patient presents with     Oncology Clinic Visit     Malignant neoplasm of upper-outer quadrant of left breast in female, estrogen receptor positive (H)     Initial Vitals: /74 (BP Location: Right arm, Patient Position: Sitting, Cuff Size: Adult Regular)   Pulse 85   Temp 98.2  F (36.8  C) (Oral)   Resp 18   Ht 1.575 m (5' 2\")   Wt 58.2 kg (128 lb 3.2 oz)   SpO2 99%   BMI 23.45 kg/m   Estimated body mass index is 23.45 kg/m  as calculated from the following:    Height as of this encounter: 1.575 m (5' 2\").    Weight as of this encounter: 58.2 kg (128 lb 3.2 oz). Body surface area is 1.6 meters squared.  No Pain (0) Comment: Data Unavailable   No LMP recorded. Patient has had a hysterectomy.  Allergies reviewed: Yes  Medications reviewed: Yes    Medications: MEDICATION REFILLS NEEDED TODAY. Provider was notified. Refill TAMOXIFEN, CHOLECALCIFEROL  Pharmacy name entered into [facility]:    [id number] - [place], [place] - [address]  [id number] - [place], [place] - [address]  [id number] [place] - [address] [facility] [address] [place] [facility] [address]    Clinical concerns: no       Selena Corona CMA          "

## 2020-04-27 ENCOUNTER — VIRTUAL VISIT (OUTPATIENT)
Dept: FAMILY MEDICINE | Facility: CLINIC | Age: 60
End: 2020-04-27
Payer: COMMERCIAL

## 2020-04-27 DIAGNOSIS — F10.90 ALCOHOL PROBLEM DRINKING: Primary | ICD-10-CM

## 2020-04-27 PROCEDURE — 99213 OFFICE O/P EST LOW 20 MIN: CPT | Mod: 95 | Performed by: FAMILY MEDICINE

## 2020-04-27 NOTE — PROGRESS NOTES
"Maryse Goins is a 59 year old female who is being evaluated via a billable video visit.      The patient has been notified of following:     \"This video visit will be conducted via a call between you and your physician/provider. We have found that certain health care needs can be provided without the need for an in-person physical exam.  This service lets us provide the care you need with a video conversation.  If a prescription is necessary we can send it directly to your pharmacy.  If lab work is needed we can place an order for that and you can then stop by our lab to have the test done at a later time.    Video visits are billed at different rates depending on your insurance coverage.  Please reach out to your insurance provider with any questions.    If during the course of the call the physician/provider feels a video visit is not appropriate, you will not be charged for this service.\"    Patient has given verbal consent for Video visit? Yes    How would you like to obtain your AVS? Bc    Patient would like the video invitation sent by: Text to cell phone: [phone]    Will anyone else be joining your video visit? No      Subjective     Maryse Goins is a 59 year old female who presents to clinic today for the following health issues:    Video Start Time: 2:11 PM    HPI  Ambulance  Followup:    Facility: Ambulance visit  Date of visit: 04/27/2020 around 1130AM  Reason for visit: patient mentions got up this AM, big cup so of coffee had not aten anything, and started getting the shaks, ate some peanut better toast and juice and a protien shake - patient kepted shaking and felt faintish - EMT had asked about alcohol  Use and patient admits drinking heavily yesterday and admits to having some drinking issue  Current Status: patient stats she took at ativan 1/2 . Patient stats she is still shaking - some nausea        Having about 1 bottle of wine a day for years  Maybe up to a box of wine " on some days  Realizes that this is not healthy and needs some help             Patient Active Problem List   Diagnosis     Malignant neoplasm of upper-outer quadrant of left breast in female, estrogen receptor positive (H)     Benign essential hypertension     Post-nasal drip     Flying phobia     Familial polyposis of colon     Family history of colon cancer     Osteoarthritis of right hip, unspecified osteoarthritis type     Past Surgical History:   Procedure Laterality Date     CHOLECYSTECTOMY       COLONOSCOPY N/A 2017    Procedure: COLONOSCOPY;  colonoscopy / gastroscopy;  Surgeon: Trev Burks MD;  Location:  GI     complete hysterectomy      pap no longer needed     ESOPHAGOSCOPY, GASTROSCOPY, DUODENOSCOPY (EGD), COMBINED N/A 2017    Procedure: COMBINED ESOPHAGOSCOPY, GASTROSCOPY, DUODENOSCOPY (EGD), BIOPSY SINGLE OR MULTIPLE;;  Surgeon: Trev Burks MD;  Location:  GI     HEMORRHOIDECTOMY EXTERNAL N/A 2017    Procedure: HEMORRHOIDECTOMY EXTERNAL;  HEMORRHOIDECTOMY EXTERNAL;  Surgeon: Madina Augustin MD;  Location:  OR     HYSTERECTOMY, PAP NO LONGER INDICATED      vaginal     MASTECTOMY MODIFIED RADICAL BILATERAL       MASTECTOMY, BILATERAL         Social History     Tobacco Use     Smoking status: Former Smoker     Last attempt to quit: 1993     Years since quittin.4     Smokeless tobacco: Never Used     Tobacco comment: 15 yrs ago   Substance Use Topics     Alcohol use: Yes     Alcohol/week: 0.0 standard drinks     Comment: 4-6  drinks weekly     Family History   Problem Relation Age of Onset     Colon Cancer Mother 49             Colon Cancer Maternal Aunt 44             Colon Cancer Maternal Uncle              Colon Cancer Maternal Aunt              Other Cancer Maternal Uncle         Stomach cancer         Current Outpatient Medications   Medication Sig Dispense Refill     aspirin 81 MG tablet Take 1 tablet by mouth  "daily.       lisinopril (PRINIVIL/ZESTRIL) 20 MG tablet Take 1 tablet (20 mg) by mouth daily 90 tablet 3     LORazepam (ATIVAN) 1 MG tablet Take 0.5 tablets (0.5 mg) by mouth every 8 hours as needed for anxiety Take 30 minutes prior to departure.  Do not operate a vehicle after taking this medication 10 tablet 1     tamoxifen (NOLVADEX) 20 MG tablet Take 1 tablet (20 mg) by mouth daily 90 tablet 3     beclomethasone (QNASL) 80 MCG/ACT nasal aerosol INSTILL 2 SPRAYS INTO BOTH NOSTRILS DAILY (Patient not taking: Reported on 4/27/2020) 8.7 g 1     vitamin D3 (CHOLECALCIFEROL) 1.25 MG (12022 UT) capsule Take 1 tablet twice weekly (Patient not taking: Reported on 4/27/2020) 24 capsule 3     Allergies   Allergen Reactions     Sulfa Drugs      Itching, swelling, irritation     Diagnostic X-Ray Materials Itching and Rash     Metrizamide Itching and Rash     Recent Labs   Lab Test 01/14/19  0855 08/31/18  1041 01/16/18  0853  12/28/16  1253   LDL  --  106*  --   --   --    HDL  --  72  --   --   --    TRIG  --  187*  --   --   --    ALT 30  --  28  --  37   CR 0.65 0.72  --    < > 0.87   GFRESTIMATED >90 83  --    < > 67   GFRESTBLACK >90 >90  --    < > 81   POTASSIUM 3.7 4.6  --    < > 4.0    < > = values in this interval not displayed.        Reviewed and updated as needed this visit by Provider         Review of Systems   ROS COMP: CONSTITUTIONAL: NEGATIVE for fever, chills, change in weight  ENT/MOUTH: NEGATIVE for ear, mouth and throat problems  RESP: NEGATIVE for significant cough or SOB  CV: NEGATIVE for chest pain, palpitations or peripheral edema      Objective    There were no vitals taken for this visit.  Estimated body mass index is 23.45 kg/m  as calculated from the following:    Height as of 3/13/20: 1.575 m (5' 2\").    Weight as of 3/13/20: 58.2 kg (128 lb 3.2 oz).  Physical Exam     GENERAL: healthy, alert and no distress  EYES: Eyes grossly normal to inspection, conjunctivae and sclerae normal  RESP: no " audible wheeze, cough, or visible cyanosis.  No visible retractions or increased work of breathing.  Able to speak fully in complete sentences.  NEURO: Cranial nerves grossly intact, mentation intact and speech normal  PSYCH: mentation appears normal, affect normal/bright, judgement and insight intact, normal speech and appearance well-groomed      Diagnostic Test Results:  Labs reviewed in Epic        Assessment & Plan       ICD-10-CM    1. Alcohol problem drinking  Z72.89      Without dependence?  working on decreasing to a healthy amount  She will call back if this seems like a problem        No follow-ups on file.    Gomez العراقي MD  [facility]      Video-Visit Details    Type of service:  Video Visit    Video End Time:2:38 PM    Counselled on nonprescription adjunct treatment and appropriate follow up. Couns time: 20 minutes of 25 minutes total face to face time.      Originating Location (pt. Location): Home    Distant Location (provider location):  [facility]     Mode of Communication:  Video Conference via Timescape    No follow-ups on file.       Gomez العراقي MD

## 2020-06-22 DIAGNOSIS — R09.82 POST-NASAL DRIP: ICD-10-CM

## 2020-06-23 RX ORDER — BECLOMETHASONE DIPROPIONATE 80 UG/1
AEROSOL, METERED NASAL
Qty: 8.7 G | Refills: 0 | Status: SHIPPED | OUTPATIENT
Start: 2020-06-23 | End: 2020-07-22

## 2020-06-23 NOTE — TELEPHONE ENCOUNTER
"Last Written Prescription Date:  3/10/2020  Last Fill Quantity: 8.7 g,  # refills: 1   Last office visit: 9/13/2019 with prescribing provider:  Yes, SHAY- physical  Virtual visit with SHAY 4/27/2020   Future Office Visit:      Prescription approved per [facility] Refill Protocol.  Sarah Bean RN    Requested Prescriptions   Signed Prescriptions Disp Refills    QNASL 80 MCG/ACT Nasal Spray 8.7 g 0     Sig: INSTILL 2 SPRAYS INTO BOTH NOSTRILS DAILY       Nasal Allergy Protocol Passed - 6/22/2020 11:40 AM        Passed - Patient is age 12 or older        Passed - Recent (12 mo) or future (30 days) visit within the authorizing provider's specialty     Patient has had an office visit with the authorizing provider or a provider within the authorizing providers department within the previous 12 mos or has a future within next 30 days. See \"Patient Info\" tab in inbasket, or \"Choose Columns\" in Meds & Orders section of the refill encounter.              Passed - Medication is active on med list                 "

## 2020-07-21 DIAGNOSIS — R09.82 POST-NASAL DRIP: ICD-10-CM

## 2020-07-22 RX ORDER — BECLOMETHASONE DIPROPIONATE 80 UG/1
AEROSOL, METERED NASAL
Qty: 10.6 G | Refills: 1 | Status: SHIPPED | OUTPATIENT
Start: 2020-07-22 | End: 2020-10-28

## 2020-07-22 NOTE — TELEPHONE ENCOUNTER
"Requested Prescriptions   Pending Prescriptions Disp Refills     QNASL 80 MCG/ACT Nasal [place] [Pharmacy Med Name: QNASL 80 MCG NASAL SPRAY]  0     Sig: INSTILL 2 SPRAYS INTO BOTH NOSTRILS DAILY       Nasal Allergy Protocol Passed - 7/21/2020 12:18 AM        Passed - Patient is age 12 or older        Passed - Recent (12 mo) or future (30 days) visit within the authorizing provider's specialty     Patient has had an office visit with the authorizing provider or a provider within the authorizing providers department within the previous 12 mos or has a future within next 30 days. See \"Patient Info\" tab in inbasket, or \"Choose Columns\" in Meds & Orders section of the refill encounter.              Passed - Medication is active on med list           Prescription [place] per [facility] [place].  "

## 2020-09-11 DIAGNOSIS — I10 BENIGN ESSENTIAL HYPERTENSION: ICD-10-CM

## 2020-09-11 RX ORDER — LISINOPRIL 20 MG/1
TABLET ORAL
Qty: 30 TABLET | Refills: 0 | Status: SHIPPED | OUTPATIENT
Start: 2020-09-11 | End: 2020-10-12

## 2020-09-11 NOTE — TELEPHONE ENCOUNTER
"Last Written Prescription Date:  9/13/2019  Last Fill Quantity: 90,  # refills: 3   Last office visit: 9/13/2019 with prescribing provider:  Yes, SHAY - physical  Virtual visit with SHAY 4/27/2020 - alcohol problem  Future Office Visit:      Medication is being filled for 1 time refill only due to:  Patient needs to be seen because due for physical.   Sarah Bean, RN    Requested Prescriptions   Signed Prescriptions Disp Refills    lisinopril (ZESTRIL) 20 MG tablet 30 tablet 0     Sig: TAKE 1 TABLET BY MOUTH EVERY DAY       ACE Inhibitors (Including Combos) Protocol Failed - 9/11/2020 12:22 AM        Failed - Normal serum creatinine on file in past 12 months     Recent Labs   Lab Test 01/14/19  0855   CR 0.65       Ok to refill medication if creatinine is low          Failed - Normal serum potassium on file in past 12 months     Recent Labs   Lab Test 01/14/19  0855   POTASSIUM 3.7             Passed - Blood pressure under 140/90 in past 12 months     BP Readings from Last 3 Encounters:   03/13/20 109/74   09/13/19 134/82   03/05/19 124/83                 Passed - Recent (12 mo) or future (30 days) visit within the authorizing provider's specialty     Patient has had an office visit with the authorizing provider or a provider within the authorizing providers department within the previous 12 mos or has a future within next 30 days. See \"Patient Info\" tab in inbasket, or \"Choose Columns\" in Meds & Orders section of the refill encounter.              Passed - Medication is active on med list        Passed - Patient is age 18 or older        Passed - No active pregnancy on record        Passed - No positive pregnancy test within past 12 months                   "

## 2020-11-05 ENCOUNTER — TRANSFERRED RECORDS (OUTPATIENT)
Dept: HEALTH INFORMATION MANAGEMENT | Facility: CLINIC | Age: 60
End: 2020-11-05

## 2020-11-09 DIAGNOSIS — I10 BENIGN ESSENTIAL HYPERTENSION: ICD-10-CM

## 2020-11-09 NOTE — TELEPHONE ENCOUNTER
Routing refill request to provider for review/approval because:  Labs not current:  Last 1/14/19.  Kelli given x1 and patient did not follow up, please advise- Added in pharm comments and sent PhytoCeut[employer] message to pt to schedule.  Please authorize if appropriate.  Thanks,  Sarah Lucero RN

## 2020-11-10 RX ORDER — LISINOPRIL 20 MG/1
TABLET ORAL
Qty: 30 TABLET | Refills: 0 | Status: SHIPPED | OUTPATIENT
Start: 2020-11-10 | End: 2020-12-07

## 2020-11-16 ENCOUNTER — HEALTH MAINTENANCE LETTER (OUTPATIENT)
Age: 60
End: 2020-11-16

## 2020-12-05 DIAGNOSIS — I10 BENIGN ESSENTIAL HYPERTENSION: ICD-10-CM

## 2020-12-07 RX ORDER — LISINOPRIL 20 MG/1
TABLET ORAL
Qty: 30 TABLET | Refills: 0 | Status: SHIPPED | OUTPATIENT
Start: 2020-12-07 | End: 2021-01-06

## 2020-12-07 NOTE — TELEPHONE ENCOUNTER
Routing refill request to provider for review/approval because:  Labs not current:  MAYNOR ALVARES RN

## 2021-03-03 ENCOUNTER — TRANSFERRED RECORDS (OUTPATIENT)
Dept: HEALTH INFORMATION MANAGEMENT | Facility: CLINIC | Age: 61
End: 2021-03-03

## 2021-03-10 ENCOUNTER — VIRTUAL VISIT (OUTPATIENT)
Dept: ONCOLOGY | Facility: CLINIC | Age: 61
End: 2021-03-10
Attending: INTERNAL MEDICINE
Payer: COMMERCIAL

## 2021-03-10 DIAGNOSIS — C50.412 MALIGNANT NEOPLASM OF UPPER-OUTER QUADRANT OF LEFT BREAST IN FEMALE, ESTROGEN RECEPTOR POSITIVE (H): ICD-10-CM

## 2021-03-10 DIAGNOSIS — Z17.0 MALIGNANT NEOPLASM OF UPPER-OUTER QUADRANT OF LEFT BREAST IN FEMALE, ESTROGEN RECEPTOR POSITIVE (H): ICD-10-CM

## 2021-03-10 PROCEDURE — 99214 OFFICE O/P EST MOD 30 MIN: CPT | Mod: GT | Performed by: INTERNAL MEDICINE

## 2021-03-10 PROCEDURE — 999N001193 HC VIDEO/TELEPHONE VISIT; NO CHARGE

## 2021-03-10 RX ORDER — TAMOXIFEN CITRATE 20 MG/1
20 TABLET ORAL DAILY
Qty: 90 TABLET | Refills: 3 | Status: SHIPPED | OUTPATIENT
Start: 2021-03-10 | End: 2022-03-11

## 2021-03-10 ASSESSMENT — PAIN SCALES - GENERAL: PAINLEVEL: SEVERE PAIN (6)

## 2021-03-10 NOTE — PROGRESS NOTES
Maryse is a 60 year old who is being evaluated via a billable video visit.      How would you like to obtain your AVS? MyChart  If the video visit is dropped, the invitation should be resent by: Text to cell phone: -  [phone]    Will anyone else be joining your video visit? No       Video-Visit Details    Type of service:  Video Visit    Visit start time: 8:57 am    Visit end time: 9:15 am    Originating Location (pt. Location): Home    Distant Location (provider location):  [facility]     Platform used for Video Visit: SignalDemand      Send Text invite to [phone]      [facility]    Hematology/Oncology Established Patient Follow-up Note      Today's Date: 03/10/21    Reason for Follow-up: left breast cancer    HISTORY OF PRESENT ILLNESS: Maryse Goins is a 60 year old female with PMHx of HTN, external hemorrhoidectomy, who presents with history of left breast cancer.  She was a patient of Dr. Prabhakar.  She was diagnosed with left breast cancer, infiltrating ductal cancer, grade 3, ER/NV positive, HER-2 negative, 8/14 positive nodes, s/p modified left radical mastectomy in 03/2006, s/p adjuvant chemotherapy with dose-dense Adriamycin and cyclophosphamide followed by Taxol, completed in 08/2006.  She completed adjuvant radiation in 10/2006.  She started Arimidex in 09/2006.  She was not willing to stop it.  It was continued to 2013 when 10 years of tamoxifen data came out.  It was changed to tamoxifen in 12/2013.     She had a prophylactic right mastectomy in 12/2009 and immediate reconstruction.  The left side is a TRAM flap and the right side is an implant.      Final pathology from the right breast showed proliferative fibrocystic changes, radical scar, sclerosing adenosis, sclerosed fibroadenoma.      She had a hysterectomy and bilateral salpingo-oophorectomy in 04/2007.    She has strong family history of colon cancer on the maternal side.  She tested  negative for BRCA1 and BRCA2.      She is .  She works in [employer].  She has twin sons and a daughter.        INTERIM HISTORY: Maryse says that she is doing very well.  She continues to take tamoxifen without problems.  She notes that she might have hip replacement surgery this year.      REVIEW OF SYSTEMS:   14 point ROS was reviewed and is negative other than as noted above in HPI.       HOME MEDICATIONS:  Current Outpatient Medications   Medication Sig Dispense Refill     aspirin 81 MG tablet Take 1 tablet by mouth daily.       lisinopril (ZESTRIL) 20 MG tablet TAKE 1 TABLET BY MOUTH EVERY DAY 30 tablet 0     LORazepam (ATIVAN) 1 MG tablet Take 0.5 tablets (0.5 mg) by mouth every 8 hours as needed for anxiety Take 30 minutes prior to departure.  Do not operate a vehicle after taking this medication 10 tablet 1     QNASL 80 MCG/ACT Nasal Spray INSTILL 2 SPRAYS INTO BOTH NOSTRILS DAILY 10.6 g 1     tamoxifen (NOLVADEX) 20 MG tablet Take 1 tablet (20 mg) by mouth daily 90 tablet 3     vitamin D3 (CHOLECALCIFEROL) 1.25 MG (83272 UT) capsule Take 1 tablet twice weekly 24 capsule 3         ALLERGIES:  Allergies   Allergen Reactions     Sulfa Drugs      Itching, swelling, irritation     Diagnostic X-Ray Materials Itching and Rash     Metrizamide Itching and Rash         PAST MEDICAL HISTORY:  Past Medical History:   Diagnosis Date     Cancer (H) 2007    Left Breast Cancer     Hypertension          PAST SURGICAL HISTORY:  Past Surgical History:   Procedure Laterality Date     CHOLECYSTECTOMY       COLONOSCOPY N/A 6/9/2017    Procedure: COLONOSCOPY;  colonoscopy / gastroscopy;  Surgeon: Trev Burks MD;  Location: [facility]     complete hysterectomy      pap no longer needed     ESOPHAGOSCOPY, GASTROSCOPY, DUODENOSCOPY (EGD), COMBINED N/A 6/9/2017    Procedure: COMBINED ESOPHAGOSCOPY, GASTROSCOPY, DUODENOSCOPY (EGD), BIOPSY SINGLE OR MULTIPLE;;  Surgeon: Trev Burks MD;  Location:  GI      HEMORRHOIDECTOMY EXTERNAL N/A 2017    Procedure: HEMORRHOIDECTOMY EXTERNAL;  HEMORRHOIDECTOMY EXTERNAL;  Surgeon: Madina Augustin MD;  Location: [facility] OR     HYSTERECTOMY, PAP NO LONGER INDICATED      vaginal     MASTECTOMY MODIFIED RADICAL BILATERAL       MASTECTOMY, BILATERAL           SOCIAL HISTORY:  Social History     Socioeconomic History     Marital status:      Spouse name: Not on file     Number of children: Not on file     Years of education: Not on file     Highest education level: Not on file   Occupational History     Not on file   Social Needs     Financial resource strain: Not on file     Food insecurity     Worry: Not on file     Inability: Not on file     Transportation needs     Medical: Not on file     Non-medical: Not on file   Tobacco Use     Smoking status: Former Smoker     Quit date: 1993     Years since quittin.3     Smokeless tobacco: Never Used     Tobacco comment: 15 yrs ago   Substance and Sexual Activity     Alcohol use: Yes     Alcohol/week: 0.0 standard drinks     Comment: 4-6  drinks weekly     Drug use: No     Sexual activity: Not Currently     Partners: Male   Lifestyle     Physical activity     Days per week: Not on file     Minutes per session: Not on file     Stress: Not on file   Relationships     Social connections     Talks on phone: Not on file     Gets together: Not on file     Attends Mu-ism service: Not on file     Active member of club or organization: Not on file     Attends meetings of clubs or organizations: Not on file     Relationship status: Not on file     Intimate partner violence     Fear of current or ex partner: Not on file     Emotionally abused: Not on file     Physically abused: Not on file     Forced sexual activity: Not on file   Other Topics Concern     Parent/sibling w/ CABG, MI or angioplasty before 65F 55M? Not Asked   Social History Narrative     Not on file         FAMILY HISTORY:  Family History   Problem Relation Age  of Onset     Colon Cancer Mother 49             Colon Cancer Maternal Aunt 44             Colon Cancer Maternal Uncle              Colon Cancer Maternal Aunt              Other Cancer Maternal Uncle         Stomach cancer         PHYSICAL EXAM:  ECO  GENERAL/CONSTITUTIONAL: No acute distress. Healthy, alert.  EYES: No scleral icterus.    RESPIRATORY: No audible wheeze, cough, or visible cyanosis.  No visible retractions or increased work of breathing.  Able to speak fully in complete sentences.  NEUROLOGIC: Alert, oriented, answers questions appropriately. No tremor. Mentation intact and speech normal  INTEGUMENTARY: No jaundice.   PSYCHIATRIC:  Mentation appears normal, affect normal/bright, judgement and insight intact, normal speech and appearance well-groomed.    The rest of a comprehensive physical exam is deferred due to public health emergency video visit restrictions.        ASSESSMENT/PLAN:  Maryse Goins is a 60 year old female with:    1) History of left breast cancer: infiltrating ductal cancer, grade 3, ER/RI positive, HER-2 negative, 8/14 positive nodes, s/p modified left radical mastectomy in 2006, s/p adjuvant chemotherapy with dose-dense Adriamycin and cyclophosphamide followed by Taxol, completed in 2006.  She completed adjuvant radiation in 10/2006.  She started Arimidex in 2006.  It was continued to  and then changed to tamoxifen in 2013.   -she plans to be on tamoxifen for 10 years.  She wants to continue taking it because it feels like a security blanket.  We discussed that there is not much data of benefit over the potential side effects to take it beyond 10 years, and she would be willing to stop taking it then.  Tamoxifen prescription renewed today.  -we discussed potential side effects.  She is tolerating tamoxifen very well.  She already had MOR/BSO.  We discussed the small risk of blood clots.  She is very active.  She will make  sure to walk around and stretch her legs every couple of hours when she travels.    -return to clinic in 1 year    2) Family history of colon cancer: Her mother  of colon cancer at age 52.  One of her mother's half sisters, one full sister, and one full brother (all her mother's siblings) also  of colon cancer.  Another maternal uncle had gastric cancer.  She started having colonoscopies at age 37 and a polyp was detected.  She underwent colonoscopy every 2 years since then and never had any polyps since then.    -She follows up with Dr. Augustin.  She was recommended by Dr. Augustin to undergo colonoscopy every 3 years.    -she had colonoscopy in 2020.  We do not have those records.  She says that there were polyps removed.  Next colonoscopy was recommended to be in 3 years from then (2023).  We will obtain colonoscopy report for our records.    3) Patient notes that she might have hip replacement surgery this year.  We discussed the risk of VTE with tamoxifen use.  She will stop the tamoxifen 1-2 weeks prior to the surgery days, and resume it after she regains mobility.  She should have routine prophylactic anticoagulation after her surgery.  She says that she will let us know when she knows when her surgery date will be.      Kait Peck MD  Hematology/Oncology  [facility]    Total time spent on day of visit, including review of tests, obtaining/reviewing separately obtained history, ordering medications/tests/procedures, communicating with PCP/consultants, and documenting in electronic medical record: 30 minutes

## 2021-03-10 NOTE — LETTER
3/10/2021         RE: Maryse Goins  [address]  [facility] [address]        Dear Colleague,    Thank you for referring your patient, Maryse Goins, to the [facility]. Please see a copy of my visit note below.    Maryse is a 60 year old who is being evaluated via a billable video visit.      How would you like to obtain your AVS? MyChart  If the video visit is dropped, the invitation should be resent by: Text to cell phone: -  [phone]    Will anyone else be joining your video visit? No       Video-Visit Details    Type of service:  Video Visit    Visit start time: 8:57 am    Visit end time: 9:15 am    Originating Location (pt. Location): Home    Distant Location (provider location):  [facility]     Platform used for Video Visit: [employer]      Send Text invite to [phone]      [facility]    Hematology/Oncology Established Patient Follow-up Note      Today's Date: 03/10/21    Reason for Follow-up: left breast cancer    HISTORY OF PRESENT ILLNESS: Maryse Goins is a 60 year old female with PMHx of HTN, external hemorrhoidectomy, who presents with history of left breast cancer.  She was a patient of Dr. Prabhakar.  She was diagnosed with left breast cancer, infiltrating ductal cancer, grade 3, ER/NC positive, HER-2 negative, 8/14 positive nodes, s/p modified left radical mastectomy in 03/2006, s/p adjuvant chemotherapy with dose-dense Adriamycin and cyclophosphamide followed by Taxol, completed in 08/2006.  She completed adjuvant radiation in 10/2006.  She started Arimidex in 09/2006.  She was not willing to stop it.  It was continued to 2013 when 10 years of tamoxifen data came out.  It was changed to tamoxifen in 12/2013.     She had a prophylactic right mastectomy in 12/2009 and immediate reconstruction.  The left side is a TRAM flap and the right side is an implant.      Final pathology from the right  breast showed proliferative fibrocystic changes, radical scar, sclerosing adenosis, sclerosed fibroadenoma.      She had a hysterectomy and bilateral salpingo-oophorectomy in 04/2007.    She has strong family history of colon cancer on the maternal side.  She tested negative for BRCA1 and BRCA2.      She is .  She works in [place].  She has twin sons and a daughter.        INTERIM HISTORY: Maryse says that she is doing very well.  She continues to take tamoxifen without problems.  She notes that she might have hip replacement surgery this year.      REVIEW OF SYSTEMS:   14 point ROS was reviewed and is negative other than as noted above in HPI.       HOME MEDICATIONS:  Current Outpatient Medications   Medication Sig Dispense Refill     aspirin 81 MG tablet Take 1 tablet by mouth daily.       lisinopril (ZESTRIL) 20 MG tablet TAKE 1 TABLET BY MOUTH EVERY DAY 30 tablet 0     LORazepam (ATIVAN) 1 MG tablet Take 0.5 tablets (0.5 mg) by mouth every 8 hours as needed for anxiety Take 30 minutes prior to departure.  Do not operate a vehicle after taking this medication 10 tablet 1     QNASL 80 MCG/ACT Nasal Spray INSTILL 2 SPRAYS INTO BOTH NOSTRILS DAILY 10.6 g 1     tamoxifen (NOLVADEX) 20 MG tablet Take 1 tablet (20 mg) by mouth daily 90 tablet 3     vitamin D3 (CHOLECALCIFEROL) 1.25 MG (57606 UT) capsule Take 1 tablet twice weekly 24 capsule 3         ALLERGIES:  Allergies   Allergen Reactions     Sulfa Drugs      Itching, swelling, irritation     Diagnostic X-Ray Materials Itching and Rash     Metrizamide Itching and Rash         PAST MEDICAL HISTORY:  Past Medical History:   Diagnosis Date     Cancer (H) 2007    Left Breast Cancer     Hypertension          PAST SURGICAL HISTORY:  Past Surgical History:   Procedure Laterality Date     CHOLECYSTECTOMY       COLONOSCOPY N/A 6/9/2017    Procedure: COLONOSCOPY;  colonoscopy / gastroscopy;  Surgeon: Trev Burks MD;  Location:  GI     complete hysterectomy       pap no longer needed     ESOPHAGOSCOPY, GASTROSCOPY, DUODENOSCOPY (EGD), COMBINED N/A 2017    Procedure: COMBINED ESOPHAGOSCOPY, GASTROSCOPY, DUODENOSCOPY (EGD), BIOPSY SINGLE OR MULTIPLE;;  Surgeon: Trev Burks MD;  Location:  GI     HEMORRHOIDECTOMY EXTERNAL N/A 2017    Procedure: HEMORRHOIDECTOMY EXTERNAL;  HEMORRHOIDECTOMY EXTERNAL;  Surgeon: Madina Augustin MD;  Location:  OR     HYSTERECTOMY, PAP NO LONGER INDICATED      vaginal     MASTECTOMY MODIFIED RADICAL BILATERAL       MASTECTOMY, BILATERAL           SOCIAL HISTORY:  Social History     Socioeconomic History     Marital status:      Spouse name: Not on file     Number of children: Not on file     Years of education: Not on file     Highest education level: Not on file   Occupational History     Not on file   Social Needs     Financial resource strain: Not on file     Food insecurity     Worry: Not on file     Inability: Not on file     Transportation needs     Medical: Not on file     Non-medical: Not on file   Tobacco Use     Smoking status: Former Smoker     Quit date: 1993     Years since quittin.3     Smokeless tobacco: Never Used     Tobacco comment: 15 yrs ago   Substance and Sexual Activity     Alcohol use: Yes     Alcohol/week: 0.0 standard drinks     Comment: 4-6  drinks weekly     Drug use: No     Sexual activity: Not Currently     Partners: Male   Lifestyle     Physical activity     Days per week: Not on file     Minutes per session: Not on file     Stress: Not on file   Relationships     Social connections     Talks on phone: Not on file     Gets together: Not on file     Attends Congregational service: Not on file     Active member of club or organization: Not on file     Attends meetings of clubs or organizations: Not on file     Relationship status: Not on file     Intimate partner violence     Fear of current or ex partner: Not on file     Emotionally abused: Not on file     Physically  abused: Not on file     Forced sexual activity: Not on file   Other Topics Concern     Parent/sibling w/ CABG, MI or angioplasty before 65F 55M? Not Asked   Social History Narrative     Not on file         FAMILY HISTORY:  Family History   Problem Relation Age of Onset     Colon Cancer Mother 49             Colon Cancer Maternal Aunt 44             Colon Cancer Maternal Uncle              Colon Cancer Maternal Aunt              Other Cancer Maternal Uncle         Stomach cancer         PHYSICAL EXAM:  ECO  GENERAL/CONSTITUTIONAL: No acute distress. Healthy, alert.  EYES: No scleral icterus.    RESPIRATORY: No audible wheeze, cough, or visible cyanosis.  No visible retractions or increased work of breathing.  Able to speak fully in complete sentences.  NEUROLOGIC: Alert, oriented, answers questions appropriately. No tremor. Mentation intact and speech normal  INTEGUMENTARY: No jaundice.   PSYCHIATRIC:  Mentation appears normal, affect normal/bright, judgement and insight intact, normal speech and appearance well-groomed.    The rest of a comprehensive physical exam is deferred due to public health emergency video visit restrictions.        ASSESSMENT/PLAN:  Maryse Goins is a 60 year old female with:    1) History of left breast cancer: infiltrating ductal cancer, grade 3, ER/WV positive, HER-2 negative, 8/14 positive nodes, s/p modified left radical mastectomy in 2006, s/p adjuvant chemotherapy with dose-dense Adriamycin and cyclophosphamide followed by Taxol, completed in 2006.  She completed adjuvant radiation in 10/2006.  She started Arimidex in 2006.  It was continued to  and then changed to tamoxifen in 2013.   -she plans to be on tamoxifen for 10 years.  She wants to continue taking it because it feels like a security blanket.  We discussed that there is not much data of benefit over the potential side effects to take it beyond 10 years, and she would  be willing to stop taking it then.  Tamoxifen prescription renewed today.  -we discussed potential side effects.  She is tolerating tamoxifen very well.  She already had MOR/BSO.  We discussed the small risk of blood clots.  She is very active.  She will make sure to walk around and stretch her legs every couple of hours when she travels.    -return to clinic in 1 year    2) Family history of colon cancer: Her mother  of colon cancer at age 52.  One of her mother's half sisters, one full sister, and one full brother (all her mother's siblings) also  of colon cancer.  Another maternal uncle had gastric cancer.  She started having colonoscopies at age 37 and a polyp was detected.  She underwent colonoscopy every 2 years since then and never had any polyps since then.    -She follows up with Dr. Augustin.  She was recommended by Dr. Augustin to undergo colonoscopy every 3 years.    -she had colonoscopy in 2020.  We do not have those records.  She says that there were polyps removed.  Next colonoscopy was recommended to be in 3 years from then (2023).  We will obtain colonoscopy report for our records.    3) Patient notes that she might have hip replacement surgery this year.  We discussed the risk of VTE with tamoxifen use.  She will stop the tamoxifen 1-2 weeks prior to the surgery days, and resume it after she regains mobility.  She should have routine prophylactic anticoagulation after her surgery.  She says that she will let us know when she knows when her surgery date will be.      Kait Peck MD  Hematology/Oncology  [facility]    Total time spent on day of visit, including review of tests, obtaining/reviewing separately obtained history, ordering medications/tests/procedures, communicating with PCP/consultants, and documenting in electronic medical record: 30 minutes        Again, thank you for allowing me to participate in the care of your patient.         Sincerely,        Kait Peck MD

## 2021-03-10 NOTE — LETTER
3/10/2021         RE: Maryse Goins  [address]  [facility] [address]        Dear Colleague,    Thank you for referring your patient, Maryse Goins, to the [facility]. Please see a copy of my visit note below.    Maryse is a 60 year old who is being evaluated via a billable video visit.      How would you like to obtain your AVS? MyChart  If the video visit is dropped, the invitation should be resent by: Text to cell phone: -  [phone]    Will anyone else be joining your video visit? No       Video-Visit Details    Type of service:  Video Visit    Visit start time: 8:57 am    Visit end time: 9:15 am    Originating Location (pt. Location): Home    Distant Location (provider location):  [facility]     Platform used for Video Visit: [employer]      Send Text invite to [phone]      [facility]    Hematology/Oncology Established Patient Follow-up Note      Today's Date: 03/10/21    Reason for Follow-up: left breast cancer    HISTORY OF PRESENT ILLNESS: Maryse Goins is a 60 year old female with PMHx of HTN, external hemorrhoidectomy, who presents with history of left breast cancer.  She was a patient of Dr. Prabhakar.  She was diagnosed with left breast cancer, infiltrating ductal cancer, grade 3, ER/MI positive, HER-2 negative, 8/14 positive nodes, s/p modified left radical mastectomy in 03/2006, s/p adjuvant chemotherapy with dose-dense Adriamycin and cyclophosphamide followed by Taxol, completed in 08/2006.  She completed adjuvant radiation in 10/2006.  She started Arimidex in 09/2006.  She was not willing to stop it.  It was continued to 2013 when 10 years of tamoxifen data came out.  It was changed to tamoxifen in 12/2013.     She had a prophylactic right mastectomy in 12/2009 and immediate reconstruction.  The left side is a TRAM flap and the right side is an implant.      Final pathology from the right  breast showed proliferative fibrocystic changes, radical scar, sclerosing adenosis, sclerosed fibroadenoma.      She had a hysterectomy and bilateral salpingo-oophorectomy in 04/2007.    She has strong family history of colon cancer on the maternal side.  She tested negative for BRCA1 and BRCA2.      She is .  She works in [place].  She has twin sons and a daughter.        INTERIM HISTORY: Maryse says that she is doing very well.  She continues to take tamoxifen without problems.  She notes that she might have hip replacement surgery this year.      REVIEW OF SYSTEMS:   14 point ROS was reviewed and is negative other than as noted above in HPI.       HOME MEDICATIONS:  Current Outpatient Medications   Medication Sig Dispense Refill     aspirin 81 MG tablet Take 1 tablet by mouth daily.       lisinopril (ZESTRIL) 20 MG tablet TAKE 1 TABLET BY MOUTH EVERY DAY 30 tablet 0     LORazepam (ATIVAN) 1 MG tablet Take 0.5 tablets (0.5 mg) by mouth every 8 hours as needed for anxiety Take 30 minutes prior to departure.  Do not operate a vehicle after taking this medication 10 tablet 1     QNASL 80 MCG/ACT Nasal Spray INSTILL 2 SPRAYS INTO BOTH NOSTRILS DAILY 10.6 g 1     tamoxifen (NOLVADEX) 20 MG tablet Take 1 tablet (20 mg) by mouth daily 90 tablet 3     vitamin D3 (CHOLECALCIFEROL) 1.25 MG (89917 UT) capsule Take 1 tablet twice weekly 24 capsule 3         ALLERGIES:  Allergies   Allergen Reactions     Sulfa Drugs      Itching, swelling, irritation     Diagnostic X-Ray Materials Itching and Rash     Metrizamide Itching and Rash         PAST MEDICAL HISTORY:  Past Medical History:   Diagnosis Date     Cancer (H) 2007    Left Breast Cancer     Hypertension          PAST SURGICAL HISTORY:  Past Surgical History:   Procedure Laterality Date     CHOLECYSTECTOMY       COLONOSCOPY N/A 6/9/2017    Procedure: COLONOSCOPY;  colonoscopy / gastroscopy;  Surgeon: Trev Burks MD;  Location:  GI     complete hysterectomy       pap no longer needed     ESOPHAGOSCOPY, GASTROSCOPY, DUODENOSCOPY (EGD), COMBINED N/A 2017    Procedure: COMBINED ESOPHAGOSCOPY, GASTROSCOPY, DUODENOSCOPY (EGD), BIOPSY SINGLE OR MULTIPLE;;  Surgeon: Trev Burks MD;  Location:  GI     HEMORRHOIDECTOMY EXTERNAL N/A 2017    Procedure: HEMORRHOIDECTOMY EXTERNAL;  HEMORRHOIDECTOMY EXTERNAL;  Surgeon: Madina Augustin MD;  Location:  OR     HYSTERECTOMY, PAP NO LONGER INDICATED      vaginal     MASTECTOMY MODIFIED RADICAL BILATERAL       MASTECTOMY, BILATERAL           SOCIAL HISTORY:  Social History     Socioeconomic History     Marital status:      Spouse name: Not on file     Number of children: Not on file     Years of education: Not on file     Highest education level: Not on file   Occupational History     Not on file   Social Needs     Financial resource strain: Not on file     Food insecurity     Worry: Not on file     Inability: Not on file     Transportation needs     Medical: Not on file     Non-medical: Not on file   Tobacco Use     Smoking status: Former Smoker     Quit date: 1993     Years since quittin.3     Smokeless tobacco: Never Used     Tobacco comment: 15 yrs ago   Substance and Sexual Activity     Alcohol use: Yes     Alcohol/week: 0.0 standard drinks     Comment: 4-6  drinks weekly     Drug use: No     Sexual activity: Not Currently     Partners: Male   Lifestyle     Physical activity     Days per week: Not on file     Minutes per session: Not on file     Stress: Not on file   Relationships     Social connections     Talks on phone: Not on file     Gets together: Not on file     Attends Adventism service: Not on file     Active member of club or organization: Not on file     Attends meetings of clubs or organizations: Not on file     Relationship status: Not on file     Intimate partner violence     Fear of current or ex partner: Not on file     Emotionally abused: Not on file     Physically  abused: Not on file     Forced sexual activity: Not on file   Other Topics Concern     Parent/sibling w/ CABG, MI or angioplasty before 65F 55M? Not Asked   Social History Narrative     Not on file         FAMILY HISTORY:  Family History   Problem Relation Age of Onset     Colon Cancer Mother 49             Colon Cancer Maternal Aunt 44             Colon Cancer Maternal Uncle              Colon Cancer Maternal Aunt              Other Cancer Maternal Uncle         Stomach cancer         PHYSICAL EXAM:  ECO  GENERAL/CONSTITUTIONAL: No acute distress. Healthy, alert.  EYES: No scleral icterus.    RESPIRATORY: No audible wheeze, cough, or visible cyanosis.  No visible retractions or increased work of breathing.  Able to speak fully in complete sentences.  NEUROLOGIC: Alert, oriented, answers questions appropriately. No tremor. Mentation intact and speech normal  INTEGUMENTARY: No jaundice.   PSYCHIATRIC:  Mentation appears normal, affect normal/bright, judgement and insight intact, normal speech and appearance well-groomed.    The rest of a comprehensive physical exam is deferred due to public health emergency video visit restrictions.        ASSESSMENT/PLAN:  Maryse Goins is a 60 year old female with:    1) History of left breast cancer: infiltrating ductal cancer, grade 3, ER/MT positive, HER-2 negative, 8/14 positive nodes, s/p modified left radical mastectomy in 2006, s/p adjuvant chemotherapy with dose-dense Adriamycin and cyclophosphamide followed by Taxol, completed in 2006.  She completed adjuvant radiation in 10/2006.  She started Arimidex in 2006.  It was continued to  and then changed to tamoxifen in 2013.   -she plans to be on tamoxifen for 10 years.  She wants to continue taking it because it feels like a security blanket.  We discussed that there is not much data of benefit over the potential side effects to take it beyond 10 years, and she would  be willing to stop taking it then.  Tamoxifen prescription renewed today.  -we discussed potential side effects.  She is tolerating tamoxifen very well.  She already had MOR/BSO.  We discussed the small risk of blood clots.  She is very active.  She will make sure to walk around and stretch her legs every couple of hours when she travels.    -return to clinic in 1 year    2) Family history of colon cancer: Her mother  of colon cancer at age 52.  One of her mother's half sisters, one full sister, and one full brother (all her mother's siblings) also  of colon cancer.  Another maternal uncle had gastric cancer.  She started having colonoscopies at age 37 and a polyp was detected.  She underwent colonoscopy every 2 years since then and never had any polyps since then.    -She follows up with Dr. Augustin.  She was recommended by Dr. Augustin to undergo colonoscopy every 3 years.    -she had colonoscopy in 2020.  We do not have those records.  She says that there were polyps removed.  Next colonoscopy was recommended to be in 3 years from then (2023).  We will obtain colonoscopy report for our records.    3) Patient notes that she might have hip replacement surgery this year.  We discussed the risk of VTE with tamoxifen use.  She will stop the tamoxifen 1-2 weeks prior to the surgery days, and resume it after she regains mobility.  She should have routine prophylactic anticoagulation after her surgery.  She says that she will let us know when she knows when her surgery date will be.      Kait Peck MD  Hematology/Oncology  [facility]    Total time spent on day of visit, including review of tests, obtaining/reviewing separately obtained history, ordering medications/tests/procedures, communicating with PCP/consultants, and documenting in electronic medical record: 30 minutes        Again, thank you for allowing me to participate in the care of your patient.         Sincerely,        Kait Peck MD

## 2021-03-11 ENCOUNTER — PATIENT OUTREACH (OUTPATIENT)
Dept: ONCOLOGY | Facility: CLINIC | Age: 61
End: 2021-03-11

## 2021-03-11 NOTE — PROGRESS NOTES
Writer contacted [facility] and left VM with medical records department requesting copy of colonoscopy report from November 2020. Will follow-up as needed.     Addendum: Records received and reviewed by Dr. Peck.     Alexandra Rushing, BSN, RN, PHN, OCN  Oncology Care Coordinator  [facility]

## 2021-03-15 ENCOUNTER — OFFICE VISIT (OUTPATIENT)
Dept: FAMILY MEDICINE | Facility: CLINIC | Age: 61
End: 2021-03-15
Payer: COMMERCIAL

## 2021-03-15 VITALS
TEMPERATURE: 96.8 F | OXYGEN SATURATION: 100 % | WEIGHT: 126 LBS | DIASTOLIC BLOOD PRESSURE: 79 MMHG | HEIGHT: 61 IN | SYSTOLIC BLOOD PRESSURE: 118 MMHG | RESPIRATION RATE: 16 BRPM | BODY MASS INDEX: 23.79 KG/M2 | HEART RATE: 69 BPM

## 2021-03-15 DIAGNOSIS — I10 BENIGN ESSENTIAL HYPERTENSION: ICD-10-CM

## 2021-03-15 DIAGNOSIS — Z00.00 ROUTINE GENERAL MEDICAL EXAMINATION AT A HEALTH CARE FACILITY: Primary | ICD-10-CM

## 2021-03-15 DIAGNOSIS — R09.82 POST-NASAL DRIP: ICD-10-CM

## 2021-03-15 DIAGNOSIS — Z13.1 DIABETES MELLITUS SCREENING: ICD-10-CM

## 2021-03-15 DIAGNOSIS — Z13.220 LIPID SCREENING: ICD-10-CM

## 2021-03-15 DIAGNOSIS — F40.243 FLYING PHOBIA: ICD-10-CM

## 2021-03-15 LAB
CHOLEST SERPL-MCNC: 243 MG/DL
GLUCOSE SERPL-MCNC: 93 MG/DL (ref 70–99)
HDLC SERPL-MCNC: 61 MG/DL
LDLC SERPL CALC-MCNC: 124 MG/DL
NONHDLC SERPL-MCNC: 182 MG/DL
TRIGL SERPL-MCNC: 292 MG/DL

## 2021-03-15 PROCEDURE — 80061 LIPID PANEL: CPT | Performed by: FAMILY MEDICINE

## 2021-03-15 PROCEDURE — 36415 COLL VENOUS BLD VENIPUNCTURE: CPT | Performed by: FAMILY MEDICINE

## 2021-03-15 PROCEDURE — 99396 PREV VISIT EST AGE 40-64: CPT | Performed by: FAMILY MEDICINE

## 2021-03-15 PROCEDURE — 82947 ASSAY GLUCOSE BLOOD QUANT: CPT | Performed by: FAMILY MEDICINE

## 2021-03-15 RX ORDER — LISINOPRIL 20 MG/1
20 TABLET ORAL DAILY
Qty: 90 TABLET | Refills: 3 | Status: SHIPPED | OUTPATIENT
Start: 2021-03-15 | End: 2022-04-22

## 2021-03-15 RX ORDER — BECLOMETHASONE DIPROPIONATE 80 UG/1
AEROSOL, METERED NASAL
Qty: 10.6 G | Refills: 11 | Status: SHIPPED | OUTPATIENT
Start: 2021-03-15 | End: 2021-07-20

## 2021-03-15 RX ORDER — LORAZEPAM 1 MG/1
0.5 TABLET ORAL EVERY 8 HOURS PRN
Qty: 10 TABLET | Refills: 1 | Status: SHIPPED | OUTPATIENT
Start: 2021-03-15 | End: 2023-03-06

## 2021-03-15 ASSESSMENT — MIFFLIN-ST. JEOR: SCORE: 1078.91

## 2021-03-15 NOTE — PROGRESS NOTES
SUBJECTIVE:   CC: Maryse Goins is an 60 year old woman who presents for preventive health visit.   Patient has been advised of split billing requirements and indicates understanding: Yes  Healthy Habits:     Getting at least 3 servings of Calcium per day:  Yes    Bi-annual eye exam:  Yes    Dental care twice a year:  NO    Sleep apnea or symptoms of sleep apnea:  None    Diet:  Regular (no restrictions)    Frequency of exercise:  4-5 days/week    Duration of exercise:  45-60 minutes    Taking medications regularly:  Yes    PHQ-2 Total Score: 0    Additional concerns today:  No      Today's PHQ-2 Score:   PHQ-2 (  [employer]) 3/9/2021   Q1: Little interest or pleasure in doing things 0   Q2: Feeling down, depressed or hopeless 0   PHQ-2 Score 0   Q1: Little interest or pleasure in doing things Not at all   Q2: Feeling down, depressed or hopeless Not at all   PHQ-2 Score 0       Abuse: Current or Past (Physical, Sexual or Emotional) - No  Do you feel safe in your environment? Yes    Have you ever done Advance Care Planning? (For example, a Health Directive, POLST, or a discussion with a medical provider or your loved ones about your wishes): No, advance care planning information given to patient to review.  Advanced care planning was discussed at today's visit.    Social History     Tobacco Use     Smoking status: Former Smoker     Packs/day: 0.00     Years: 0.00     Pack years: 0.00     Quit date: 1993     Years since quittin.3     Smokeless tobacco: Never Used     Tobacco comment: 15 yrs ago   Substance Use Topics     Alcohol use: Yes     Alcohol/week: 0.0 standard drinks     Comment: 4-6  drinks weekly     If you drink alcohol do you typically have >3 drinks per day or >7 drinks per week? No    No flowsheet data found.    Any new diagnosis of family breast, ovarian, or bowel cancer? No     Reviewed orders with patient.  Reviewed health maintenance and updated orders accordingly - Yes  Lab work  is in process  Labs reviewed in EPIC  BP Readings from Last 3 Encounters:   03/15/21 118/79   20 109/74   19 134/82    Wt Readings from Last 3 Encounters:   03/15/21 57.2 kg (126 lb)   20 58.2 kg (128 lb 3.2 oz)   19 58.6 kg (129 lb 1.6 oz)              Patient Active Problem List   Diagnosis     Malignant neoplasm of upper-outer quadrant of left breast in female, estrogen receptor positive (H)     Benign essential hypertension     Post-nasal drip     Flying phobia     Familial polyposis of colon     Family history of colon cancer     Osteoarthritis of right hip, unspecified osteoarthritis type     Past Surgical History:   Procedure Laterality Date     CHOLECYSTECTOMY       COLONOSCOPY N/A 2017    Procedure: COLONOSCOPY;  colonoscopy / gastroscopy;  Surgeon: Trev Burks MD;  Location:  GI     complete hysterectomy      pap no longer needed     COSMETIC SURGERY      breast reconstruction     ESOPHAGOSCOPY, GASTROSCOPY, DUODENOSCOPY (EGD), COMBINED N/A 2017    Procedure: COMBINED ESOPHAGOSCOPY, GASTROSCOPY, DUODENOSCOPY (EGD), BIOPSY SINGLE OR MULTIPLE;;  Surgeon: Trev Burks MD;  Location:  GI     HEMORRHOIDECTOMY EXTERNAL N/A 2017    Procedure: HEMORRHOIDECTOMY EXTERNAL;  HEMORRHOIDECTOMY EXTERNAL;  Surgeon: Madina Augustin MD;  Location:  OR     HYSTERECTOMY, PAP NO LONGER INDICATED      vaginal     MASTECTOMY MODIFIED RADICAL BILATERAL       MASTECTOMY, BILATERAL         Social History     Tobacco Use     Smoking status: Former Smoker     Packs/day: 0.00     Years: 0.00     Pack years: 0.00     Quit date: 1993     Years since quittin.3     Smokeless tobacco: Never Used     Tobacco comment: 15 yrs ago   Substance Use Topics     Alcohol use: Yes     Alcohol/week: 0.0 standard drinks     Comment: 4-6  drinks weekly     Family History   Problem Relation Age of Onset     Colon Cancer Mother 49             Colon Cancer Maternal  Aunt 44             Colon Cancer Maternal Uncle              Colon Cancer Maternal Aunt              Other Cancer Maternal Uncle         Stomach cancer     Diabetes Maternal Grandmother      Hypertension Sister      Thyroid Disease Sister      Hypertension Brother          Current Outpatient Medications   Medication Sig Dispense Refill     aspirin 81 MG tablet Take 1 tablet by mouth daily.       lisinopril (ZESTRIL) 20 MG tablet TAKE 1 TABLET BY MOUTH EVERY DAY 30 tablet 0     LORazepam (ATIVAN) 1 MG tablet Take 0.5 tablets (0.5 mg) by mouth every 8 hours as needed for anxiety Take 30 minutes prior to departure.  Do not operate a vehicle after taking this medication 10 tablet 1     QNASL 80 MCG/ACT Nasal Spray INSTILL 2 SPRAYS INTO BOTH NOSTRILS DAILY 10.6 g 1     tamoxifen (NOLVADEX) 20 MG tablet Take 1 tablet (20 mg) by mouth daily 90 tablet 3     vitamin D3 (CHOLECALCIFEROL) 1.25 MG (83014 UT) capsule Take 1 tablet twice weekly 24 capsule 3     Allergies   Allergen Reactions     Sulfa Drugs      Itching, swelling, irritation     Diagnostic X-Ray Materials Itching and Rash     Metrizamide Itching and Rash       Breast CA Risk Screening:  Breast CA Risk Assessment (FHS-7) 3/15/2021   Did any of your first-degree relatives have breast or ovarian cancer? Yes   Did any of your relatives have bilateral breast cancer? No   Did any man in your family have breast cancer? No   Did any woman in your family have breast and ovarian cancer? No   Did any woman in your family have breast cancer before age 50 y? No   Do you have 2 or more relatives with breast and/or ovarian cancer? No   Do you have 2 or more relatives with breast and/or bowel cancer? No       Hx of breat cancer  1) History of left breast cancer: infiltrating ductal cancer, grade 3, ER/MI positive, HER-2 negative, 8/14 positive nodes, s/p modified left radical mastectomy in 2006, s/p adjuvant chemotherapy with dose-dense Adriamycin and  cyclophosphamide followed by Taxol, completed in 08/2006.  She completed adjuvant radiation in 10/2006.  She started Arimidex in 09/2006.  It was continued to 2013 and then changed to tamoxifen in 12/2013.   -she plans to be on tamoxifen for 10 years.  She wants to continue taking it because it feels like a security blanket.  We discussed that there is not much data of benefit over the potential side effects to take it beyond 10 years, and she would be willing to stop taking it then.  Tamoxifen prescription renewed today.  -we discussed potential side effects.  She is tolerating tamoxifen very well.  She already had MOR/BSO.  We discussed the small risk of blood clots.  She is very active.  She will make sure to walk around and stretch her legs every couple of hours when she travels.      Pertinent mammograms are reviewed under the imaging tab.    History of abnormal Pap smear: Status post benign hysterectomy. Health Maintenance and Surgical History updated.     Reviewed and updated as needed this visit by clinical staff  Tobacco  Allergies  Meds   Med Hx  Surg Hx  Fam Hx  Soc Hx        Reviewed and updated as needed this visit by Provider                 Review of Systems  CONSTITUTIONAL: NEGATIVE for fever, chills, change in weight  INTEGUMENTARY/SKIN: NEGATIVE for worrisome rashes, moles or lesions  EYES: NEGATIVE for vision changes or irritation  ENT: NEGATIVE for ear, mouth and throat problems  RESP: NEGATIVE for significant cough or SOB  BREAST: NEGATIVE for masses, tenderness or discharge  CV: NEGATIVE for chest pain, palpitations or peripheral edema  GI: NEGATIVE for nausea, abdominal pain, heartburn, or change in bowel habits  : NEGATIVE for unusual urinary or vaginal symptoms. No vaginal bleeding.  MUSCULOSKELETAL: NEGATIVE for significant arthralgias or myalgia  NEURO: NEGATIVE for weakness, dizziness or paresthesias  PSYCHIATRIC: NEGATIVE for changes in mood or affect      OBJECTIVE:   /79   " Pulse 69   Temp 96.8  F (36  C) (Tympanic)   Resp 16   Ht 1.549 m (5' 1\")   Wt 57.2 kg (126 lb)   SpO2 100%   BMI 23.81 kg/m    Physical Exam    General Appearance: Pleasant, alert, in no acute respiratory distress.  Head Exam: Normal. Normocephalic, atraumatic. No sinus tenderness.  Eye Exam: Normal external eye, conjunctiva, lids. TONG.  Ear Exam: Normal auditory canals and external ears. Non-tender.  Left TM-normal. Right TM-normal.  Neck Exam: Supple, no obvious thyroid enlargement  Chest/Respiratory Exam: Normal, comfortable, easy respirations. Chest wall normal. Lungs are clear to auscultation. No wheezing, crackles, or rhonchi.  Cardiovascular Exam: Regular rate and rhythm. No murmur, gallop, or rubs.  Musculoskeletal Exam: Back is non-tender, full ROM of upper and lower extremities.  Skin: no rash, warm and dry.    Neurologic Exam: Nonfocal, no tremor. Normal gait.  Psychiatric Exam: Alert - appropriate, normal affect      ASSESSMENT/PLAN:       ICD-10-CM    1. Routine general medical examination at a health care facility  Z00.00    2. Post-nasal drip  R09.82 beclomethasone (QNASL) 80 MCG/ACT nasal aerosol   3. Flying phobia  F40.243 LORazepam (ATIVAN) 1 MG tablet   4. Benign essential hypertension  I10 lisinopril (ZESTRIL) 20 MG tablet   5. Lipid screening  Z13.220 Lipid Profile (Chol, Trig, HDL, LDL calc)   6. Diabetes mellitus screening  Z13.1 Glucose     Patient has stable chronic conditions as noted in A/P including  Post-nasal drip, Flying phobia, Benign essential hypertension,  were also pertinent to this visit.    These diagnoses were each reviewed for stability and medications were reviewed and refilled, labs ordered and updated.    Patient has been advised of split billing requirements and indicates understanding: Yes  COUNSELING:  Reviewed preventive health counseling, as reflected in patient instructions       Regular exercise       Healthy diet/nutrition       Osteoporosis " "prevention/bone health    Estimated body mass index is 23.81 kg/m  as calculated from the following:    Height as of this encounter: 1.549 m (5' 1\").    Weight as of this encounter: 57.2 kg (126 lb).        She reports that she quit smoking about 27 years ago. She smoked 0.00 packs per day for 0.00 years. She has never used smokeless tobacco.      Counseling Resources:  ATP IV Guidelines  Pooled Cohorts Equation Calculator  Breast Cancer Risk Calculator  BRCA-Related Cancer Risk Assessment: FHS-7 Tool  FRAX Risk Assessment  ICSI Preventive Guidelines  Dietary Guidelines for Americans, 2010  USDA's MyPlate  ASA Prophylaxis  Lung CA Screening    Gomez Jovany العراقي MD  [facility]  "

## 2021-03-20 ENCOUNTER — IMMUNIZATION (OUTPATIENT)
Dept: NURSING | Facility: CLINIC | Age: 61
End: 2021-03-20
Payer: COMMERCIAL

## 2021-03-20 PROCEDURE — 91300 PR COVID VAC PFIZER DIL RECON 30 MCG/0.3 ML IM: CPT

## 2021-03-20 PROCEDURE — 0001A PR COVID VAC PFIZER DIL RECON 30 MCG/0.3 ML IM: CPT

## 2021-04-10 ENCOUNTER — IMMUNIZATION (OUTPATIENT)
Dept: NURSING | Facility: CLINIC | Age: 61
End: 2021-04-10
Attending: INTERNAL MEDICINE
Payer: COMMERCIAL

## 2021-04-10 PROCEDURE — 91300 PR COVID VAC PFIZER DIL RECON 30 MCG/0.3 ML IM: CPT

## 2021-04-10 PROCEDURE — 0002A PR COVID VAC PFIZER DIL RECON 30 MCG/0.3 ML IM: CPT

## 2021-04-16 NOTE — LETTER
[facility]  [address], [address]  [place], [place] [address]  [phone]     June 9, 2017     Maryse Hanson                                                                                                                               [address]   [facility] [address]        Dear Maryse,    Vitamin D level is normal, 1000 IU daily in diet or supplements is recommended.     Please keep us posted with questions or concerns .      Best Regards,    Julieth Perez MD  [facility]  [phone]        Clinic hours:  Monday   7:30 AM - 5:00 PM    Tuesday  7:00 AM - 7:00 PM    Wednesday  7:00 AM - 5:00 PM    Thursday  7:30 AM - 7:00 PM    Friday   7:30 AM - 5:00 PM                      
[employer]

## 2021-04-23 ENCOUNTER — OFFICE VISIT (OUTPATIENT)
Dept: FAMILY MEDICINE | Facility: CLINIC | Age: 61
End: 2021-04-23
Payer: COMMERCIAL

## 2021-04-23 VITALS
DIASTOLIC BLOOD PRESSURE: 88 MMHG | HEIGHT: 61 IN | TEMPERATURE: 97.8 F | OXYGEN SATURATION: 100 % | HEART RATE: 80 BPM | RESPIRATION RATE: 16 BRPM | BODY MASS INDEX: 24.39 KG/M2 | SYSTOLIC BLOOD PRESSURE: 148 MMHG | WEIGHT: 129.2 LBS

## 2021-04-23 DIAGNOSIS — Z01.818 PREOP GENERAL PHYSICAL EXAM: Primary | ICD-10-CM

## 2021-04-23 DIAGNOSIS — M16.11 PRIMARY OSTEOARTHRITIS OF RIGHT HIP: ICD-10-CM

## 2021-04-23 LAB
ANION GAP SERPL CALCULATED.3IONS-SCNC: 8 MMOL/L (ref 3–14)
BUN SERPL-MCNC: 12 MG/DL (ref 7–30)
CALCIUM SERPL-MCNC: 9.5 MG/DL (ref 8.5–10.1)
CHLORIDE SERPL-SCNC: 104 MMOL/L (ref 94–109)
CO2 SERPL-SCNC: 28 MMOL/L (ref 20–32)
CREAT SERPL-MCNC: 0.65 MG/DL (ref 0.52–1.04)
GFR SERPL CREATININE-BSD FRML MDRD: >90 ML/MIN/{1.73_M2}
GLUCOSE SERPL-MCNC: 102 MG/DL (ref 70–99)
HGB BLD-MCNC: 13.1 G/DL (ref 11.7–15.7)
POTASSIUM SERPL-SCNC: 4.2 MMOL/L (ref 3.4–5.3)
SODIUM SERPL-SCNC: 140 MMOL/L (ref 133–144)

## 2021-04-23 PROCEDURE — 85018 HEMOGLOBIN: CPT | Performed by: FAMILY MEDICINE

## 2021-04-23 PROCEDURE — 80048 BASIC METABOLIC PNL TOTAL CA: CPT | Performed by: FAMILY MEDICINE

## 2021-04-23 PROCEDURE — 36415 COLL VENOUS BLD VENIPUNCTURE: CPT | Performed by: FAMILY MEDICINE

## 2021-04-23 PROCEDURE — 99214 OFFICE O/P EST MOD 30 MIN: CPT | Performed by: FAMILY MEDICINE

## 2021-04-23 ASSESSMENT — MIFFLIN-ST. JEOR: SCORE: 1093.43

## 2021-04-23 NOTE — PROGRESS NOTES
[facility]  [address]  [facility] [address]  Phone: [phone]  Primary Provider: Yvette Maya  Pre-op Performing Provider: YVETTE MAYA    PREOPERATIVE EVALUATION:  Today's date: 4/23/2021    Maryse Goins is a 60 year old female who presents for a preoperative evaluation.    Surgical Information:  Surgery/Procedure: Right Hip Replacement  Surgery Location: [facility]  Surgeon: Dr. Boogie Perez  Surgery Date: 5/18/2021  Time of Surgery: TBD  Where patient plans to recover: At home with family  Fax number for surgical facility:    [phone]    Type of Anesthesia Anticipated: to be determined    Assessment & Plan     The proposed surgical procedure is considered INTERMEDIATE risk.    Preop general physical exam      Primary osteoarthritis of right hip             Risks and Recommendations:  The patient has the following additional risks and recommendations for perioperative complications:   - No identified additional risk factors other than previously addressed    Medication Instructions:   - aspirin: Discontinue aspirin 7-10 days prior to procedure to reduce bleeding risk. It should be resumed postoperatively.      OK to stop tamoxifen 2 weeks ahead of time to reduce DVT risk      RECOMMENDATION:  APPROVAL GIVEN to proceed with proposed procedure, without further diagnostic evaluation.      Subjective     HPI related to upcoming procedure: R hip pain, long term, worsening    Preop Questions 4/23/2021   1. Have you ever had a heart attack or stroke? No   2. Have you ever had surgery on your heart or blood vessels, such as a stent placement, a coronary artery bypass, or surgery on an artery in your head, neck, heart, or legs? No   3. Do you have chest pain with activity? No   4. Do you have a history of  heart failure? No   5. Do you currently have a cold, bronchitis or symptoms of other infection? No   6. Do you have a cough,  shortness of breath, or wheezing? No   7. Do you or anyone in your family have previous history of blood clots? No   8. Do you or does anyone in your family have a serious bleeding problem such as prolonged bleeding following surgeries or cuts? No   9. Have you ever had problems with anemia or been told to take iron pills? No   10. Have you had any abnormal blood loss such as black, tarry or bloody stools, or abnormal vaginal bleeding? No   11. Have you ever had a blood transfusion? No   12. Are you willing to have a blood transfusion if it is medically needed before, during, or after your surgery? Yes   13. Have you or any of your relatives ever had problems with anesthesia? No   14. Do you have sleep apnea, excessive snoring or daytime drowsiness? No   15. Do you have any artifical heart valves or other implanted medical devices like a pacemaker, defibrillator, or continuous glucose monitor? No   16. Do you have artificial joints? No   17. Are you allergic to latex? No   18. Is there any chance that you may be pregnant? No       Health Care Directive:  Patient does not have a Health Care Directive or Living Will:     Preoperative Review of :   reviewed - controlled substances reflected in medication list.      Review of Systems  10 point ROS of systems including Constitutional, Eyes, Respiratory, Cardiovascular, Gastroenterology, Genitourinary, Integumentary, Muscularskeletal, Psychiatric were all negative except for pertinent positives noted in my HPI.      Patient Active Problem List    Diagnosis Date Noted     Osteoarthritis of right hip, unspecified osteoarthritis type 09/13/2019     Priority: Medium     Post-nasal drip 04/24/2017     Priority: Medium     Flying phobia 04/24/2017     Priority: Medium     Patient is followed by YVETTE MAYA for ongoing prescription of benzodiazepines.  All refills should be approved by this provider, or covering partner.    Medication(s): Ativan.   Maximum  quantity per month:   Clinic visit frequency required:      Controlled substance agreement on file: No  Benzodiazepine use reviewed by psychiatry:      Last [facility] website verification:  done on 2018   [email or web address]           Benign essential hypertension 2017     Priority: Medium     wants to think it over   fup in 4 weeks for recheck- either nurse only or ROV for recheck       Family history of colon cancer 12/15/2015     Priority: Medium     Overview:   Mother dx 49 ( age 52), maternal aunt x2 and maternal uncle x1 also   with colon CA.  Q2Y screening recommended.       Familial polyposis of colon 2011     Priority: Medium     Overview:    Possible HNPCC. Get colonoscopy every 2 years. Due in 2016       Malignant neoplasm of upper-outer quadrant of left breast in female, estrogen receptor positive (H) 2006     Priority: Medium     left breast cancer, infiltrating ductal cancer, grade 3, ER/AR positive, HER-2 negative, 8/14 positive nodes, modified left radical mastectomy 2006, status post dose-dense Adriamycin and cyclophosphamide followed by Taxol after mastectomy, finished 2006. Finished radiation 10/2006  Patient transferring care to Dr. Peck secondary to insurance issues. No new diagnoses.         Past Medical History:   Diagnosis Date     Cancer (H)     Left Breast Cancer     Hypertension      Osteoarthritis of right hip, unspecified osteoarthritis type      Past Surgical History:   Procedure Laterality Date     CHOLECYSTECTOMY       COLONOSCOPY N/A 2017    Procedure: COLONOSCOPY;  colonoscopy / gastroscopy;  Surgeon: Trev Burks MD;  Location:  GI     complete hysterectomy      pap no longer needed     COSMETIC SURGERY      breast reconstruction     ESOPHAGOSCOPY, GASTROSCOPY, DUODENOSCOPY (EGD), COMBINED N/A 2017    Procedure: COMBINED ESOPHAGOSCOPY, GASTROSCOPY, DUODENOSCOPY (EGD), BIOPSY SINGLE OR MULTIPLE;;  Surgeon: Kimmie  Trev Tadeo MD;  Location:  GI     HEMORRHOIDECTOMY EXTERNAL N/A 2017    Procedure: HEMORRHOIDECTOMY EXTERNAL;  HEMORRHOIDECTOMY EXTERNAL;  Surgeon: Madina Augustin MD;  Location:  OR     HYSTERECTOMY, PAP NO LONGER INDICATED      vaginal     MASTECTOMY MODIFIED RADICAL BILATERAL       MASTECTOMY, BILATERAL       Current Outpatient Medications   Medication Sig Dispense Refill     aspirin 81 MG tablet Take 1 tablet by mouth daily.       beclomethasone (QNASL) 80 MCG/ACT nasal aerosol INSTILL 2 SPRAYS INTO BOTH NOSTRILS DAILY 10.6 g 11     lisinopril (ZESTRIL) 20 MG tablet Take 1 tablet (20 mg) by mouth daily 90 tablet 3     LORazepam (ATIVAN) 1 MG tablet Take 0.5 tablets (0.5 mg) by mouth every 8 hours as needed for anxiety Take 30 minutes prior to departure.  Do not operate a vehicle after taking this medication 10 tablet 1     tamoxifen (NOLVADEX) 20 MG tablet Take 1 tablet (20 mg) by mouth daily 90 tablet 3     vitamin D3 (CHOLECALCIFEROL) 1.25 MG (71500 UT) capsule Take 1 tablet twice weekly 24 capsule 3       Allergies   Allergen Reactions     Sulfa Drugs      Itching, swelling, irritation     Diagnostic X-Ray Materials Itching and Rash     Metrizamide Itching and Rash        Social History     Tobacco Use     Smoking status: Former Smoker     Packs/day: 0.00     Years: 0.00     Pack years: 0.00     Quit date: 1993     Years since quittin.4     Smokeless tobacco: Never Used     Tobacco comment: 15 yrs ago   Substance Use Topics     Alcohol use: Yes     Alcohol/week: 0.0 standard drinks     Comment: 4-6  drinks weekly     Family History   Problem Relation Age of Onset     Colon Cancer Mother 49             Colon Cancer Maternal Aunt 44             Colon Cancer Maternal Uncle              Colon Cancer Maternal Aunt              Other Cancer Maternal Uncle         Stomach cancer     Diabetes Maternal Grandmother      Hypertension Sister      Thyroid Disease  "Sister      Hypertension Brother      History   Drug Use No         Objective     BP (!) 148/88   Pulse 80   Temp 97.8  F (36.6  C) (Tympanic)   Resp 16   Ht 1.549 m (5' 1\")   Wt 58.6 kg (129 lb 3.2 oz)   SpO2 100%   BMI 24.41 kg/m      Physical Exam    General Appearance: Pleasant, alert, in no acute respiratory distress.  Head Exam: Normal. Normocephalic, atraumatic. No sinus tenderness.  Eye Exam: Normal external eye, conjunctiva, lids. TONG.  Ear Exam: Normal auditory canals and external ears. Non-tender.  Left TM-normal. Right TM-normal.  Neck Exam: Supple, no obvious thyroid enlargement  Chest/Respiratory Exam: Normal, comfortable, easy respirations. Chest wall normal. Lungs are clear to auscultation. No wheezing, crackles, or rhonchi.  Oral exam normal  Cardiovascular Exam: Regular rate and rhythm. No murmur, gallop, or rubs.  Musculoskeletal Exam: Back is non-tender, full ROM of upper and lower extremities.  Skin: no rash, warm and dry.   Neurologic Exam: Nonfocal, no tremor. Normal gait.  Psychiatric Exam: Alert - appropriate, normal affect      Diagnostics:     No EKG required, no history of coronary heart disease, significant arrhythmia, peripheral arterial disease or other structural heart disease.    Results for orders placed or performed in visit on 04/23/21   Basic metabolic panel     Status: Abnormal   Result Value Ref Range    Sodium 140 133 - 144 mmol/L    Potassium 4.2 3.4 - 5.3 mmol/L    Chloride 104 94 - 109 mmol/L    Carbon Dioxide 28 20 - 32 mmol/L    Anion Gap 8 3 - 14 mmol/L    Glucose 102 (H) 70 - 99 mg/dL    Urea Nitrogen 12 7 - 30 mg/dL    Creatinine 0.65 0.52 - 1.04 mg/dL    GFR Estimate >90 >60 mL/min/[1.73_m2]    GFR Estimate If Black >90 >60 mL/min/[1.73_m2]    Calcium 9.5 8.5 - 10.1 mg/dL   Hemoglobin     Status: None   Result Value Ref Range    Hemoglobin 13.1 11.7 - 15.7 g/dL       Revised Cardiac Risk Index (RCRI):  The patient has the following serious cardiovascular risks " for perioperative complications:   - No serious cardiac risks = 0 points     RCRI Interpretation: 0 points: Class I (very low risk - 0.4% complication rate)           Signed Electronically by: Gomez العراقي MD  Copy of this evaluation report is provided to requesting physician.

## 2021-04-28 ENCOUNTER — MYC MEDICAL ADVICE (OUTPATIENT)
Dept: FAMILY MEDICINE | Facility: CLINIC | Age: 61
End: 2021-04-28

## 2021-05-01 DIAGNOSIS — R79.89 LOW VITAMIN D LEVEL: ICD-10-CM

## 2021-05-03 RX ORDER — CHOLECALCIFEROL (VITAMIN D3) 1250 MCG
CAPSULE ORAL
Qty: 24 CAPSULE | Refills: 3 | Status: SHIPPED | OUTPATIENT
Start: 2021-05-03 | End: 2022-04-26

## 2021-05-06 ENCOUNTER — ALLIED HEALTH/NURSE VISIT (OUTPATIENT)
Dept: NURSING | Facility: CLINIC | Age: 61
End: 2021-05-06
Payer: COMMERCIAL

## 2021-05-06 VITALS — SYSTOLIC BLOOD PRESSURE: 130 MMHG | DIASTOLIC BLOOD PRESSURE: 81 MMHG

## 2021-05-06 DIAGNOSIS — I10 BENIGN ESSENTIAL HYPERTENSION: Primary | ICD-10-CM

## 2021-05-06 PROCEDURE — 99207 PR NO CHARGE NURSE ONLY: CPT

## 2021-05-06 NOTE — PROGRESS NOTES
Patient here for BP check today  Checked after 5 minutes of rest  Last time BP high d/t large latte    Patient having upcoming surgery at [facility]   Added recheck of BP to 4/23/2021 OV notes and refaxed to O as they wanted to know pt's BP recheck reading  Radha ALVARES RN

## 2021-05-06 NOTE — PROGRESS NOTES
Patient was in for BP recheck today 05/06/21  BP was 130/81  Refaxing preop with BP recheck to CORNELIUS ALVARES RN

## 2021-05-18 ENCOUNTER — TRANSFERRED RECORDS (OUTPATIENT)
Dept: HEALTH INFORMATION MANAGEMENT | Facility: CLINIC | Age: 61
End: 2021-05-18

## 2021-06-03 ENCOUNTER — TRANSFERRED RECORDS (OUTPATIENT)
Dept: HEALTH INFORMATION MANAGEMENT | Facility: CLINIC | Age: 61
End: 2021-06-03

## 2021-07-01 ENCOUNTER — TRANSFERRED RECORDS (OUTPATIENT)
Dept: HEALTH INFORMATION MANAGEMENT | Facility: CLINIC | Age: 61
End: 2021-07-01

## 2021-07-05 ENCOUNTER — TELEPHONE (OUTPATIENT)
Dept: FAMILY MEDICINE | Facility: CLINIC | Age: 61
End: 2021-07-05

## 2021-07-05 NOTE — TELEPHONE ENCOUNTER
Prior Authorization Approval    Authorization Effective Date: 7/5/2021  Authorization Expiration Date: 7/5/2022  Medication: QNASL 80 MCG/ACT nasal aerosol  Approved Dose/Quantity:   Reference #: [id number]   Insurance Company: [employer] ([facility]) - Phone [phone] Fax [phone]  Expected CoPay:       CoPay Card Available:      Foundation Assistance Needed:    Which Pharmacy is filling the prescription (Not needed for infusion/clinic administered): [id number] - [place], [place] - [address]  Pharmacy Notified: Yes  Patient Notified: Yes  **Instructed pharmacy to notify patient when script is ready to /ship.**

## 2021-07-05 NOTE — TELEPHONE ENCOUNTER
PA Initiation    Medication: QNASL 80 MCG/ACT nasal aerosol  Insurance Company: [employer] ([facility]) - Phone [phone] Fax [phone]  Pharmacy Filling the Rx: [id number] - [place] - [address]  Filling Pharmacy Phone: [phone]  Filling Pharmacy Fax: [phone]  Start Date: 7/5/2021

## 2021-07-05 NOTE — TELEPHONE ENCOUNTER
Prior Authorization Retail Medication Request    Medication/Dose: QNASL 80 MCG/ACT nasal aerosol  ICD code (if different than what is on RX):    Previously Tried and Failed:    Rationale:      Insurance Name:    Insurance ID:        Pharmacy Information (if different than what is on RX)  Name:  [id number]   Phone:  [phone]

## 2021-07-10 DIAGNOSIS — Z17.0 MALIGNANT NEOPLASM OF UPPER-OUTER QUADRANT OF LEFT BREAST IN FEMALE, ESTROGEN RECEPTOR POSITIVE (H): ICD-10-CM

## 2021-07-10 DIAGNOSIS — C50.412 MALIGNANT NEOPLASM OF UPPER-OUTER QUADRANT OF LEFT BREAST IN FEMALE, ESTROGEN RECEPTOR POSITIVE (H): ICD-10-CM

## 2021-07-12 RX ORDER — TAMOXIFEN CITRATE 20 MG/1
TABLET ORAL
Qty: 90 TABLET | Refills: 3 | OUTPATIENT
Start: 2021-07-12

## 2021-07-12 NOTE — TELEPHONE ENCOUNTER
Rx was already sent 3/10/21 #90 with 3 refills.    Called pharmacy to verify they have refills on file already. They will fill. No further follow-up needed.     Alexandra Rushing, YOUSUFN, RN, PHN, OCN  Oncology Care Coordinator  [facility]

## 2021-07-20 ENCOUNTER — MYC MEDICAL ADVICE (OUTPATIENT)
Dept: FAMILY MEDICINE | Facility: CLINIC | Age: 61
End: 2021-07-20

## 2021-07-20 DIAGNOSIS — R09.82 POST-NASAL DRIP: ICD-10-CM

## 2021-07-20 RX ORDER — BECLOMETHASONE DIPROPIONATE 80 UG/1
AEROSOL, METERED NASAL
Qty: 10.6 G | Refills: 5 | Status: SHIPPED | OUTPATIENT
Start: 2021-07-20 | End: 2022-02-11

## 2021-09-01 NOTE — TELEPHONE ENCOUNTER
"[facility]   Denied  Too early [facility] [place] 9/13/2019 for 1 year  Radha ALVARES RN    Last Written Prescription Date:  9/13/2019  Last Fill Quantity: 90,  # refills: 3   Last office visit: 9/13/2019 with prescribing provider:     Future Office Visit:   Next 5 appointments (look out 90 days)    Mar 13, 2020  8:30 AM CDT  Return Visit with Kait Peck MD  [facility] ([facility]) [address]  [facility] [place]  [place] [address]  [phone]         Requested Prescriptions   Pending Prescriptions Disp Refills     lisinopril (ZESTRIL) 20 MG tablet [Pharmacy Med Name: LISINOPRIL 20 MG TABLET] 90 tablet 2     Sig: TAKE 1 TABLET BY MOUTH EVERY DAY       ACE Inhibitors (Including Combos) Protocol Failed - 3/11/2020  2:26 PM        Failed - Normal serum creatinine on file in past 12 months     Recent Labs   Lab Test 01/14/19  0855   CR 0.65             Failed - Normal serum potassium on file in past 12 months     Recent Labs   Lab Test 01/14/19  0855   POTASSIUM 3.7             Passed - Blood pressure under 140/90 in past 12 months     BP Readings from Last 3 Encounters:   09/13/19 134/82   03/05/19 124/83   02/25/19 131/83                 Passed - Recent (12 mo) or future (30 days) visit within the authorizing provider's specialty     Patient has had an office visit with the authorizing provider or a provider within the authorizing providers department within the previous 12 mos or has a future within next 30 days. See \"Patient Info\" tab in inbasket, or \"Choose Columns\" in Meds & Orders section of the refill encounter.              Passed - Medication is active on med list        Passed - Patient is age 18 or older        Passed - No active pregnancy on record        Passed - No positive pregnancy test within past 12 months           " ACP Resident

## 2021-09-18 ENCOUNTER — HEALTH MAINTENANCE LETTER (OUTPATIENT)
Age: 61
End: 2021-09-18

## 2021-10-20 ENCOUNTER — IMMUNIZATION (OUTPATIENT)
Dept: NURSING | Facility: CLINIC | Age: 61
End: 2021-10-20
Payer: COMMERCIAL

## 2021-10-20 PROCEDURE — 91300 PR COVID VAC PFIZER DIL RECON 30 MCG/0.3 ML IM: CPT

## 2021-10-20 PROCEDURE — 0003A PR COVID VAC PFIZER DIL RECON 30 MCG/0.3 ML IM: CPT

## 2022-03-11 ENCOUNTER — ONCOLOGY VISIT (OUTPATIENT)
Dept: ONCOLOGY | Facility: CLINIC | Age: 62
End: 2022-03-11
Attending: INTERNAL MEDICINE
Payer: COMMERCIAL

## 2022-03-11 VITALS
RESPIRATION RATE: 16 BRPM | WEIGHT: 128.6 LBS | BODY MASS INDEX: 24.28 KG/M2 | HEART RATE: 83 BPM | SYSTOLIC BLOOD PRESSURE: 131 MMHG | DIASTOLIC BLOOD PRESSURE: 87 MMHG | HEIGHT: 61 IN | OXYGEN SATURATION: 99 %

## 2022-03-11 DIAGNOSIS — C50.412 MALIGNANT NEOPLASM OF UPPER-OUTER QUADRANT OF LEFT BREAST IN FEMALE, ESTROGEN RECEPTOR POSITIVE (H): ICD-10-CM

## 2022-03-11 DIAGNOSIS — Z17.0 MALIGNANT NEOPLASM OF UPPER-OUTER QUADRANT OF LEFT BREAST IN FEMALE, ESTROGEN RECEPTOR POSITIVE (H): ICD-10-CM

## 2022-03-11 PROCEDURE — G0463 HOSPITAL OUTPT CLINIC VISIT: HCPCS

## 2022-03-11 PROCEDURE — 99214 OFFICE O/P EST MOD 30 MIN: CPT | Performed by: INTERNAL MEDICINE

## 2022-03-11 RX ORDER — TAMOXIFEN CITRATE 20 MG/1
20 TABLET ORAL DAILY
Qty: 90 TABLET | Refills: 3 | Status: SHIPPED | OUTPATIENT
Start: 2022-03-11 | End: 2023-07-14

## 2022-03-11 ASSESSMENT — PAIN SCALES - GENERAL: PAINLEVEL: NO PAIN (0)

## 2022-03-11 NOTE — PROGRESS NOTES
"Oncology Rooming Note    March 11, 2022 9:36 AM   Maryse Goins is a 61 year old female who presents for:    Chief Complaint   Patient presents with     Oncology Clinic Visit     Initial Vitals: There were no vitals taken for this visit. Estimated body mass index is 24.41 kg/m  as calculated from the following:    Height as of 4/23/21: 1.549 m (5' 1\").    Weight as of 4/23/21: 58.6 kg (129 lb 3.2 oz). There is no height or weight on file to calculate BSA.  Data Unavailable Comment: Data Unavailable   No LMP recorded. Patient has had a hysterectomy.  Allergies reviewed: Yes  Medications reviewed: Yes    Medications: Medication refills not needed today.  Pharmacy name entered into [employer]:    [id number] [place] - [address]  [facility][id number] - [place], [place] - [address]  [id number] [place] [place] - [address] [address] [facility] [employer] - [place] - [address]    Clinical concerns:  doctor was notified.      Alma Son MA            "

## 2022-03-11 NOTE — PROGRESS NOTES
[facility]    Hematology/Oncology Established Patient Follow-up Note      Today's Date: 03/11/22    Reason for Follow-up: left breast cancer    HISTORY OF PRESENT ILLNESS: Maryse Goins is a 61 year old female with PMHx of HTN, external hemorrhoidectomy, who presents with history of left breast cancer.  She was a patient of Dr. Prabhakar.  She was diagnosed with left breast cancer, infiltrating ductal cancer, grade 3, ER/ND positive, HER-2 negative, 8/14 positive nodes, s/p modified left radical mastectomy in 03/2006, s/p adjuvant chemotherapy with dose-dense Adriamycin and cyclophosphamide followed by Taxol, completed in 08/2006.  She completed adjuvant radiation in 10/2006.  She started Arimidex in 09/2006.  She was not willing to stop it.  It was continued to 2013 when 10 years of tamoxifen data came out.  It was changed to tamoxifen in 12/2013.     She had a prophylactic right mastectomy in 12/2009 and immediate reconstruction.  The left side is a TRAM flap and the right side is an implant.      Final pathology from the right breast showed proliferative fibrocystic changes, radical scar, sclerosing adenosis, sclerosed fibroadenoma.      She had a hysterectomy and bilateral salpingo-oophorectomy in 04/2007.    She has strong family history of colon cancer on the maternal side.  She tested negative for BRCA1 and BRCA2.      She is .  She works in [employer].  She has twin sons and a daughter.        INTERIM HISTORY: Maryse is here for follow-up.  She underwent right total hip arthroplasty in May 2021.  She says that she recovered quickly and is very happy with the results.  She continues to work and is very busy.      REVIEW OF SYSTEMS:   14 point ROS was reviewed and is negative other than as noted above in HPI.       HOME MEDICATIONS:  Current Outpatient Medications   Medication Sig Dispense Refill     aspirin 81 MG tablet Take 1 tablet by mouth daily.       cholecalciferol (D3-50)  1250 mcg (45869 units) capsule TAKE 1 CAPSULE BY MOUTH TWICE WEEKLY 24 capsule 3     lisinopril (ZESTRIL) 20 MG tablet Take 1 tablet (20 mg) by mouth daily 90 tablet 3     LORazepam (ATIVAN) 1 MG tablet Take 0.5 tablets (0.5 mg) by mouth every 8 hours as needed for anxiety Take 30 minutes prior to departure.  Do not operate a vehicle after taking this medication 10 tablet 1     QNASL 80 MCG/ACT Nasal Spray INSTILL 2 SPRAYS INTO BOTH NOSTRILS DAILY. 10.6 g 1     tamoxifen (NOLVADEX) 20 MG tablet Take 1 tablet (20 mg) by mouth daily 90 tablet 3         ALLERGIES:  Allergies   Allergen Reactions     Sulfa Drugs      Itching, swelling, irritation     Diagnostic X-Ray Materials Itching and Rash     Metrizamide Itching and Rash         PAST MEDICAL HISTORY:  Past Medical History:   Diagnosis Date     Cancer (H) 2007    Left Breast Cancer     Hypertension      Osteoarthritis of right hip, unspecified osteoarthritis type          PAST SURGICAL HISTORY:  Past Surgical History:   Procedure Laterality Date     CHOLECYSTECTOMY       COLONOSCOPY N/A 6/9/2017    Procedure: COLONOSCOPY;  colonoscopy / gastroscopy;  Surgeon: Trev Burks MD;  Location:  GI     complete hysterectomy      pap no longer needed     COSMETIC SURGERY  2009    breast reconstruction     ESOPHAGOSCOPY, GASTROSCOPY, DUODENOSCOPY (EGD), COMBINED N/A 6/9/2017    Procedure: COMBINED ESOPHAGOSCOPY, GASTROSCOPY, DUODENOSCOPY (EGD), BIOPSY SINGLE OR MULTIPLE;;  Surgeon: Trev Burks MD;  Location:  GI     HEMORRHOIDECTOMY EXTERNAL N/A 11/9/2017    Procedure: HEMORRHOIDECTOMY EXTERNAL;  HEMORRHOIDECTOMY EXTERNAL;  Surgeon: Madina Augustin MD;  Location:  OR     HYSTERECTOMY, PAP NO LONGER INDICATED      vaginal     MASTECTOMY MODIFIED RADICAL BILATERAL       MASTECTOMY, BILATERAL           SOCIAL HISTORY:  Social History     Socioeconomic History     Marital status:      Spouse name: Not on file     Number of children: Not on  "file     Years of education: Not on file     Highest education level: Not on file   Occupational History     Not on file   Tobacco Use     Smoking status: Former Smoker     Packs/day: 0.00     Years: 0.00     Pack years: 0.00     Quit date: 1993     Years since quittin.3     Smokeless tobacco: Never Used     Tobacco comment: 15 yrs ago   Substance and Sexual Activity     Alcohol use: Yes     Alcohol/week: 0.0 standard drinks     Comment: 4-6  drinks weekly     Drug use: No     Sexual activity: Not Currently     Partners: Male   Other Topics Concern     Parent/sibling w/ CABG, MI or angioplasty before 65F 55M? No   Social History Narrative     Not on file     Social Determinants of Health     Financial Resource Strain: Not on file   Food Insecurity: Not on file   Transportation Needs: Not on file   Physical Activity: Not on file   Stress: Not on file   Social Connections: Not on file   Intimate Partner Violence: Not on file   Housing Stability: Not on file         FAMILY HISTORY:  Family History   Problem Relation Age of Onset     Colon Cancer Mother 49             Colon Cancer Maternal Aunt 44             Colon Cancer Maternal Uncle              Colon Cancer Maternal Aunt              Other Cancer Maternal Uncle         Stomach cancer     Diabetes Maternal Grandmother      Hypertension Sister      Thyroid Disease Sister      Hypertension Brother          PHYSICAL EXAM:  /87   Pulse 83   Resp 16   Ht 1.549 m (5' 1\")   Wt 58.3 kg (128 lb 9.6 oz)   SpO2 99%   BMI 24.30 kg/m    ECO  GENERAL/CONSTITUTIONAL: No acute distress.  EYES: No scleral icterus.  LYMPH: No anterior cervical, posterior cervical, supraclavicular, or axillary adenopathy.   RESPIRATORY: Clear to auscultation bilaterally. No crackles or wheezing.   CARDIOVASCULAR: Regular rate and rhythm without murmurs, gallops, or rubs.  GASTROINTESTINAL: No tenderness. No guarding.  No distention.  BREAST: " s/p bilateral mastectomies with reconstruction.  MUSCULOSKELETAL: Warm and well-perfused, no cyanosis, clubbing, or edema.  NEUROLOGIC: Alert, oriented, answers questions appropriately.  INTEGUMENTARY: No jaundice.  GAIT: Steady, does not use assistive device        ASSESSMENT/PLAN:  Maryse Goins is a 61 year old female with:    1) History of left breast cancer: infiltrating ductal cancer, grade 3, ER/OR positive, HER-2 negative, 8/14 positive nodes, s/p modified left radical mastectomy in 2006, s/p adjuvant chemotherapy with dose-dense Adriamycin and cyclophosphamide followed by Taxol, completed in 2006.  She completed adjuvant radiation in 10/2006.  She started Arimidex in 2006.  It was continued to  and then changed to tamoxifen in 2013.   -she plans to be on tamoxifen for 10 years.  She wants to continue taking it because it feels like a security blanket.  We discussed that there is not much data of benefit over the potential side effects to take it beyond 10 years, and she would be willing to stop taking it then.  Tamoxifen prescription renewed today.  -we discussed potential side effects.  She is tolerating tamoxifen very well.  She already had MOR/BSO.  We discussed the small risk of blood clots.  She is very active.  She will make sure to walk around and stretch her legs every couple of hours when she travels.    -return to clinic in 1 year    2) Family history of colon cancer: Her mother  of colon cancer at age 52.  One of her mother's half sisters, one full sister, and one full brother (all her mother's siblings) also  of colon cancer.  Another maternal uncle had gastric cancer.  She started having colonoscopies at age 37 and a polyp was detected.  She underwent colonoscopy every 2 years since then and never had any polyps since then.    -She follows up with Dr. Augustin.  She was recommended by Dr. Augustin to undergo colonoscopy every 3 years.    -she had colonoscopy in November  2020.  2 polyps were removed (tubular adenoma and hyperplastic polyp).  Next colonoscopy was recommended to be in 3 years from then (November 2023).      3) Bone health: Last DEXA was done in March 2017 that was normal.  -continue following with PCP      Kait Peck MD  Hematology/Oncology  [facility]    Total time spent on day of visit, including review of tests, obtaining/reviewing separately obtained history, ordering medications/tests/procedures, communicating with PCP/consultants, and documenting in electronic medical record: 20 minutes

## 2022-03-11 NOTE — LETTER
3/11/2022         RE: Maryse Goins  [address]  [place] [address]        Dear Colleague,    Thank you for referring your patient, Maryse Goins, to the [facility]. Please see a copy of my visit note below.    [facility]    Hematology/Oncology Established Patient Follow-up Note      Today's Date: 03/11/22    Reason for Follow-up: left breast cancer    HISTORY OF PRESENT ILLNESS: Maryse Goins is a 61 year old female with PMHx of HTN, external hemorrhoidectomy, who presents with history of left breast cancer.  She was a patient of Dr. Prabhakar.  She was diagnosed with left breast cancer, infiltrating ductal cancer, grade 3, ER/GA positive, HER-2 negative, 8/14 positive nodes, s/p modified left radical mastectomy in 03/2006, s/p adjuvant chemotherapy with dose-dense Adriamycin and cyclophosphamide followed by Taxol, completed in 08/2006.  She completed adjuvant radiation in 10/2006.  She started Arimidex in 09/2006.  She was not willing to stop it.  It was continued to 2013 when 10 years of tamoxifen data came out.  It was changed to tamoxifen in 12/2013.     She had a prophylactic right mastectomy in 12/2009 and immediate reconstruction.  The left side is a TRAM flap and the right side is an implant.      Final pathology from the right breast showed proliferative fibrocystic changes, radical scar, sclerosing adenosis, sclerosed fibroadenoma.      She had a hysterectomy and bilateral salpingo-oophorectomy in 04/2007.    She has strong family history of colon cancer on the maternal side.  She tested negative for BRCA1 and BRCA2.      She is .  She works in [place].  She has twin sons and a daughter.        INTERIM HISTORY: Maryse is here for follow-up.  She underwent right total hip arthroplasty in May 2021.  She says that she recovered quickly and is very happy with the results.  She continues to work and is very  busy.      REVIEW OF SYSTEMS:   14 point ROS was reviewed and is negative other than as noted above in HPI.       HOME MEDICATIONS:  Current Outpatient Medications   Medication Sig Dispense Refill     aspirin 81 MG tablet Take 1 tablet by mouth daily.       cholecalciferol (D3-50) 1250 mcg (78377 units) capsule TAKE 1 CAPSULE BY MOUTH TWICE WEEKLY 24 capsule 3     lisinopril (ZESTRIL) 20 MG tablet Take 1 tablet (20 mg) by mouth daily 90 tablet 3     LORazepam (ATIVAN) 1 MG tablet Take 0.5 tablets (0.5 mg) by mouth every 8 hours as needed for anxiety Take 30 minutes prior to departure.  Do not operate a vehicle after taking this medication 10 tablet 1     QNASL 80 MCG/ACT Nasal Spray INSTILL 2 SPRAYS INTO BOTH NOSTRILS DAILY. 10.6 g 1     tamoxifen (NOLVADEX) 20 MG tablet Take 1 tablet (20 mg) by mouth daily 90 tablet 3         ALLERGIES:  Allergies   Allergen Reactions     Sulfa Drugs      Itching, swelling, irritation     Diagnostic X-Ray Materials Itching and Rash     Metrizamide Itching and Rash         PAST MEDICAL HISTORY:  Past Medical History:   Diagnosis Date     Cancer (H) 2007    Left Breast Cancer     Hypertension      Osteoarthritis of right hip, unspecified osteoarthritis type          PAST SURGICAL HISTORY:  Past Surgical History:   Procedure Laterality Date     CHOLECYSTECTOMY       COLONOSCOPY N/A 6/9/2017    Procedure: COLONOSCOPY;  colonoscopy / gastroscopy;  Surgeon: Trev Burks MD;  Location:  GI     complete hysterectomy      pap no longer needed     COSMETIC SURGERY  2009    breast reconstruction     ESOPHAGOSCOPY, GASTROSCOPY, DUODENOSCOPY (EGD), COMBINED N/A 6/9/2017    Procedure: COMBINED ESOPHAGOSCOPY, GASTROSCOPY, DUODENOSCOPY (EGD), BIOPSY SINGLE OR MULTIPLE;;  Surgeon: Trev Burks MD;  Location:  GI     HEMORRHOIDECTOMY EXTERNAL N/A 11/9/2017    Procedure: HEMORRHOIDECTOMY EXTERNAL;  HEMORRHOIDECTOMY EXTERNAL;  Surgeon: Madnia Augustin MD;  Location:  OR  "    HYSTERECTOMY, PAP NO LONGER INDICATED      vaginal     MASTECTOMY MODIFIED RADICAL BILATERAL       MASTECTOMY, BILATERAL           SOCIAL HISTORY:  Social History     Socioeconomic History     Marital status:      Spouse name: Not on file     Number of children: Not on file     Years of education: Not on file     Highest education level: Not on file   Occupational History     Not on file   Tobacco Use     Smoking status: Former Smoker     Packs/day: 0.00     Years: 0.00     Pack years: 0.00     Quit date: 1993     Years since quittin.3     Smokeless tobacco: Never Used     Tobacco comment: 15 yrs ago   Substance and Sexual Activity     Alcohol use: Yes     Alcohol/week: 0.0 standard drinks     Comment: 4-6  drinks weekly     Drug use: No     Sexual activity: Not Currently     Partners: Male   Other Topics Concern     Parent/sibling w/ CABG, MI or angioplasty before 65F 55M? No   Social History Narrative     Not on file     Social Determinants of Health     Financial Resource Strain: Not on file   Food Insecurity: Not on file   Transportation Needs: Not on file   Physical Activity: Not on file   Stress: Not on file   Social Connections: Not on file   Intimate Partner Violence: Not on file   Housing Stability: Not on file         FAMILY HISTORY:  Family History   Problem Relation Age of Onset     Colon Cancer Mother 49             Colon Cancer Maternal Aunt 44             Colon Cancer Maternal Uncle              Colon Cancer Maternal Aunt              Other Cancer Maternal Uncle         Stomach cancer     Diabetes Maternal Grandmother      Hypertension Sister      Thyroid Disease Sister      Hypertension Brother          PHYSICAL EXAM:  /87   Pulse 83   Resp 16   Ht 1.549 m (5' 1\")   Wt 58.3 kg (128 lb 9.6 oz)   SpO2 99%   BMI 24.30 kg/m    ECO  GENERAL/CONSTITUTIONAL: No acute distress.  EYES: No scleral icterus.  LYMPH: No anterior cervical, " posterior cervical, supraclavicular, or axillary adenopathy.   RESPIRATORY: Clear to auscultation bilaterally. No crackles or wheezing.   CARDIOVASCULAR: Regular rate and rhythm without murmurs, gallops, or rubs.  GASTROINTESTINAL: No tenderness. No guarding.  No distention.  BREAST: s/p bilateral mastectomies with reconstruction.  MUSCULOSKELETAL: Warm and well-perfused, no cyanosis, clubbing, or edema.  NEUROLOGIC: Alert, oriented, answers questions appropriately.  INTEGUMENTARY: No jaundice.  GAIT: Steady, does not use assistive device        ASSESSMENT/PLAN:  Maryse Goins is a 61 year old female with:    1) History of left breast cancer: infiltrating ductal cancer, grade 3, ER/CT positive, HER-2 negative,  positive nodes, s/p modified left radical mastectomy in 2006, s/p adjuvant chemotherapy with dose-dense Adriamycin and cyclophosphamide followed by Taxol, completed in 2006.  She completed adjuvant radiation in 10/2006.  She started Arimidex in 2006.  It was continued to  and then changed to tamoxifen in 2013.   -she plans to be on tamoxifen for 10 years.  She wants to continue taking it because it feels like a security blanket.  We discussed that there is not much data of benefit over the potential side effects to take it beyond 10 years, and she would be willing to stop taking it then.  Tamoxifen prescription renewed today.  -we discussed potential side effects.  She is tolerating tamoxifen very well.  She already had MOR/BSO.  We discussed the small risk of blood clots.  She is very active.  She will make sure to walk around and stretch her legs every couple of hours when she travels.    -return to clinic in 1 year    2) Family history of colon cancer: Her mother  of colon cancer at age 52.  One of her mother's half sisters, one full sister, and one full brother (all her mother's siblings) also  of colon cancer.  Another maternal uncle had gastric cancer.  She started  "having colonoscopies at age 37 and a polyp was detected.  She underwent colonoscopy every 2 years since then and never had any polyps since then.    -She follows up with Dr. Augustin.  She was recommended by Dr. Augustin to undergo colonoscopy every 3 years.    -she had colonoscopy in November 2020.  2 polyps were removed (tubular adenoma and hyperplastic polyp).  Next colonoscopy was recommended to be in 3 years from then (November 2023).      3) Bone health: Last DEXA was done in March 2017 that was normal.  -continue following with PCP      Kait Peck MD  Hematology/Oncology  [facility]    Total time spent on day of visit, including review of tests, obtaining/reviewing separately obtained history, ordering medications/tests/procedures, communicating with PCP/consultants, and documenting in electronic medical record: 20 minutes    Oncology Rooming Note    March 11, 2022 9:36 AM   Maryse Goins is a 61 year old female who presents for:    Chief Complaint   Patient presents with     Oncology Clinic Visit     Initial Vitals: There were no vitals taken for this visit. Estimated body mass index is 24.41 kg/m  as calculated from the following:    Height as of 4/23/21: 1.549 m (5' 1\").    Weight as of 4/23/21: 58.6 kg (129 lb 3.2 oz). There is no height or weight on file to calculate BSA.  Data Unavailable Comment: Data Unavailable   No LMP recorded. Patient has had a hysterectomy.  Allergies reviewed: Yes  Medications reviewed: Yes    Medications: Medication refills not needed today.  Pharmacy name entered into [facility]:    [facility][id number] - [place], [place] - [address]  [facility][id number] - [facility] [address]  [facility][id number] - [facility] [address] [address] [place] [employer] - [place] [id number]    Clinical concerns:  doctor was notified.      Alma Son MA                Again, thank you for allowing me to participate in the care of your patient.  "       Sincerely,        Kait Peck MD

## 2022-04-14 ENCOUNTER — IMMUNIZATION (OUTPATIENT)
Dept: NURSING | Facility: CLINIC | Age: 62
End: 2022-04-14
Payer: COMMERCIAL

## 2022-04-14 PROCEDURE — 0054A COVID-19,PF,PFIZER (12+ YRS): CPT

## 2022-04-14 PROCEDURE — 91305 COVID-19,PF,PFIZER (12+ YRS): CPT

## 2022-04-18 DIAGNOSIS — I10 BENIGN ESSENTIAL HYPERTENSION: ICD-10-CM

## 2022-04-18 NOTE — TELEPHONE ENCOUNTER
Routing to Team silver    Please call patient to schedule annual preventtive.    Patient due 4-23    Armando Reyes, RN, BSN, PHN  [facility]

## 2022-04-22 RX ORDER — LISINOPRIL 20 MG/1
TABLET ORAL
Qty: 90 TABLET | Refills: 3 | Status: SHIPPED | OUTPATIENT
Start: 2022-04-22 | End: 2022-07-14

## 2022-04-22 NOTE — TELEPHONE ENCOUNTER
Routing to PCP-    Pt scheduled for next visit 5/26/22    Okay to refill till next visit.     Rx pending approval if appropriate    Misa Weiner RN     transfer tub bench

## 2022-04-22 NOTE — TELEPHONE ENCOUNTER
Routing to RN.  Please do gardenia refill.  Patient leaving for out of town tomorrow so need this refilled today.  Patient calling to check on status of refill. I scheduled patient for physical with Dr العراقي on 05/26/22.    Merly Mandujano [facility]

## 2022-04-23 DIAGNOSIS — R79.89 LOW VITAMIN D LEVEL: ICD-10-CM

## 2022-04-25 NOTE — TELEPHONE ENCOUNTER
Last prescribing provider:     Last clinic visit date: 3/11/2022     Any missed appointments or no-shows since last clinic visit?: none    Recommendations for requested medication (if none, N/A): n/a    Next clinic visit date: not yet scheduled    Any other pertinent information (if none, N/A): N/A

## 2022-04-25 NOTE — TELEPHONE ENCOUNTER
Writer called pt to inquire about questions from Brandon Stanford PA Barta, Cody, RN  Caller: Unspecified (2 days ago, 12:22 AM)  This is usually not an ongoing medication at this high of dose.     Can you check with her on this and see if she has ever taken an over the counter supplement?     No answer, left VM to call triage back.

## 2022-04-26 RX ORDER — CHOLECALCIFEROL (VITAMIN D3) 1250 MCG
CAPSULE ORAL
Qty: 24 CAPSULE | Refills: 3 | Status: SHIPPED | OUTPATIENT
Start: 2022-04-26 | End: 2023-05-03

## 2022-04-26 NOTE — TELEPHONE ENCOUNTER
Writer called pt to inquire about questions from Brandon SEWELL.    Brandon Khan, PA  You 19 hours ago (3:46 PM)     AW    This is usually not an ongoing medication at this high of dose.     Can you check with her on this and see if she has ever taken an over the counter supplement?      Patient states that she does require this high level of Vitamin D to sustain adequate levels in her body.    Pt confirmed that she is not taking any supplemental Vitamin D OTC. Pt stated that she requires a prescription to obtain the levels of Vitamin D she requires as OTC does not offer this dose.    Will route to provider.

## 2022-04-30 ENCOUNTER — HEALTH MAINTENANCE LETTER (OUTPATIENT)
Age: 62
End: 2022-04-30

## 2022-07-11 ASSESSMENT — ENCOUNTER SYMPTOMS
HEADACHES: 0
PARESTHESIAS: 0
FEVER: 0
BREAST MASS: 0
ABDOMINAL PAIN: 0
WEAKNESS: 0
CHILLS: 0
ARTHRALGIAS: 0
PALPITATIONS: 0
FREQUENCY: 0
SORE THROAT: 0
HEMATURIA: 0
DYSURIA: 0
MYALGIAS: 0
DIZZINESS: 0
HEMATOCHEZIA: 0
NAUSEA: 0
HEARTBURN: 0
SHORTNESS OF BREATH: 0
COUGH: 0
DIARRHEA: 0
NERVOUS/ANXIOUS: 0
EYE PAIN: 0
JOINT SWELLING: 0
CONSTIPATION: 0

## 2022-07-11 ASSESSMENT — ANXIETY QUESTIONNAIRES
3. WORRYING TOO MUCH ABOUT DIFFERENT THINGS: NOT AT ALL
5. BEING SO RESTLESS THAT IT IS HARD TO SIT STILL: NOT AT ALL
GAD7 TOTAL SCORE: 0
1. FEELING NERVOUS, ANXIOUS, OR ON EDGE: NOT AT ALL
GAD7 TOTAL SCORE: 0
4. TROUBLE RELAXING: NOT AT ALL
2. NOT BEING ABLE TO STOP OR CONTROL WORRYING: NOT AT ALL
GAD7 TOTAL SCORE: 0
7. FEELING AFRAID AS IF SOMETHING AWFUL MIGHT HAPPEN: NOT AT ALL
6. BECOMING EASILY ANNOYED OR IRRITABLE: NOT AT ALL
7. FEELING AFRAID AS IF SOMETHING AWFUL MIGHT HAPPEN: NOT AT ALL

## 2022-07-11 ASSESSMENT — PATIENT HEALTH QUESTIONNAIRE - PHQ9
SUM OF ALL RESPONSES TO PHQ QUESTIONS 1-9: 0
SUM OF ALL RESPONSES TO PHQ QUESTIONS 1-9: 0
10. IF YOU CHECKED OFF ANY PROBLEMS, HOW DIFFICULT HAVE THESE PROBLEMS MADE IT FOR YOU TO DO YOUR WORK, TAKE CARE OF THINGS AT HOME, OR GET ALONG WITH OTHER PEOPLE: NOT DIFFICULT AT ALL

## 2022-07-14 ENCOUNTER — OFFICE VISIT (OUTPATIENT)
Dept: FAMILY MEDICINE | Facility: CLINIC | Age: 62
End: 2022-07-14
Payer: COMMERCIAL

## 2022-07-14 VITALS
WEIGHT: 125.4 LBS | HEART RATE: 72 BPM | BODY MASS INDEX: 23.68 KG/M2 | RESPIRATION RATE: 20 BRPM | SYSTOLIC BLOOD PRESSURE: 132 MMHG | OXYGEN SATURATION: 100 % | HEIGHT: 61 IN | DIASTOLIC BLOOD PRESSURE: 74 MMHG | TEMPERATURE: 97.8 F

## 2022-07-14 DIAGNOSIS — Z00.00 PHYSICAL EXAM, ROUTINE: Primary | ICD-10-CM

## 2022-07-14 DIAGNOSIS — Z11.59 NEED FOR HEPATITIS C SCREENING TEST: ICD-10-CM

## 2022-07-14 DIAGNOSIS — I10 BENIGN ESSENTIAL HYPERTENSION: ICD-10-CM

## 2022-07-14 PROCEDURE — 99396 PREV VISIT EST AGE 40-64: CPT | Mod: 25 | Performed by: FAMILY MEDICINE

## 2022-07-14 PROCEDURE — 36415 COLL VENOUS BLD VENIPUNCTURE: CPT | Performed by: FAMILY MEDICINE

## 2022-07-14 PROCEDURE — 80048 BASIC METABOLIC PNL TOTAL CA: CPT | Performed by: FAMILY MEDICINE

## 2022-07-14 PROCEDURE — 90715 TDAP VACCINE 7 YRS/> IM: CPT | Performed by: FAMILY MEDICINE

## 2022-07-14 PROCEDURE — 90471 IMMUNIZATION ADMIN: CPT | Performed by: FAMILY MEDICINE

## 2022-07-14 PROCEDURE — 86803 HEPATITIS C AB TEST: CPT | Performed by: FAMILY MEDICINE

## 2022-07-14 RX ORDER — LISINOPRIL 20 MG/1
20 TABLET ORAL DAILY
Qty: 90 TABLET | Refills: 3 | Status: SHIPPED | OUTPATIENT
Start: 2022-07-14 | End: 2023-07-14

## 2022-07-14 ASSESSMENT — ENCOUNTER SYMPTOMS
HEMATOCHEZIA: 0
ABDOMINAL PAIN: 0
WEAKNESS: 0
PARESTHESIAS: 0
JOINT SWELLING: 0
SHORTNESS OF BREATH: 0
EYE PAIN: 0
DIZZINESS: 0
DIARRHEA: 0
NAUSEA: 0
HEMATURIA: 0
NERVOUS/ANXIOUS: 0
BREAST MASS: 0
FEVER: 0
PALPITATIONS: 0
HEADACHES: 0
DYSURIA: 0
FREQUENCY: 0
CHILLS: 0
COUGH: 0
CONSTIPATION: 0
MYALGIAS: 0
ARTHRALGIAS: 0
HEARTBURN: 0
SORE THROAT: 0

## 2022-07-14 ASSESSMENT — PAIN SCALES - GENERAL: PAINLEVEL: NO PAIN (0)
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
## 2022-07-15 LAB
ANION GAP SERPL CALCULATED.3IONS-SCNC: 2 MMOL/L (ref 3–14)
BUN SERPL-MCNC: 10 MG/DL (ref 7–30)
CALCIUM SERPL-MCNC: 9.1 MG/DL (ref 8.5–10.1)
CHLORIDE BLD-SCNC: 107 MMOL/L (ref 94–109)
CO2 SERPL-SCNC: 29 MMOL/L (ref 20–32)
CREAT SERPL-MCNC: 0.55 MG/DL (ref 0.52–1.04)
GFR SERPL CREATININE-BSD FRML MDRD: >90 ML/MIN/1.73M2
GLUCOSE BLD-MCNC: 96 MG/DL (ref 70–99)
HCV AB SERPL QL IA: NONREACTIVE
POTASSIUM BLD-SCNC: 4.8 MMOL/L (ref 3.4–5.3)
SODIUM SERPL-SCNC: 138 MMOL/L (ref 133–144)

## 2022-08-24 ENCOUNTER — OFFICE VISIT (OUTPATIENT)
Dept: URGENT CARE | Facility: URGENT CARE | Age: 62
End: 2022-08-24
Payer: COMMERCIAL

## 2022-08-24 VITALS
SYSTOLIC BLOOD PRESSURE: 118 MMHG | OXYGEN SATURATION: 100 % | HEART RATE: 78 BPM | TEMPERATURE: 99.1 F | BODY MASS INDEX: 23.6 KG/M2 | WEIGHT: 125 LBS | HEIGHT: 61 IN | RESPIRATION RATE: 16 BRPM | DIASTOLIC BLOOD PRESSURE: 66 MMHG

## 2022-08-24 DIAGNOSIS — J30.89 SEASONAL ALLERGIC RHINITIS DUE TO OTHER ALLERGIC TRIGGER: Primary | ICD-10-CM

## 2022-08-24 PROCEDURE — 99213 OFFICE O/P EST LOW 20 MIN: CPT | Performed by: STUDENT IN AN ORGANIZED HEALTH CARE EDUCATION/TRAINING PROGRAM

## 2022-08-24 RX ORDER — CETIRIZINE HYDROCHLORIDE 10 MG/1
10 TABLET ORAL DAILY
Qty: 90 TABLET | Refills: 0 | Status: SHIPPED | OUTPATIENT
Start: 2022-08-24 | End: 2023-07-14

## 2022-08-24 RX ORDER — OLOPATADINE HYDROCHLORIDE 2 MG/ML
1 SOLUTION/ DROPS OPHTHALMIC DAILY
Qty: 2.5 ML | Refills: 1 | Status: SHIPPED | OUTPATIENT
Start: 2022-08-24 | End: 2023-07-14

## 2022-08-29 NOTE — TELEPHONE ENCOUNTER
FUTURE VISIT INFORMATION      FUTURE VISIT INFORMATION:    Date: 11/9/22    Time: 9AM    Location: [facility]  REFERRAL INFORMATION:    Referring provider:  Lizett Hickman MD    Referring providers clinic:  [facility]     Reason for visit/diagnosis  Per Pt, dx rhinitis referral from jason Mcmahon in [facility]    RECORDS REQUESTED FROM:       Clinic name Comments Records Status Imaging Status   [facility] 8/24/22 note and referral from Lizett Hickman MD [facility]   5/31/13 note from Raj Art MD   Care everywhere

## 2022-09-19 ENCOUNTER — MYC MEDICAL ADVICE (OUTPATIENT)
Dept: FAMILY MEDICINE | Facility: CLINIC | Age: 62
End: 2022-09-19

## 2022-09-19 DIAGNOSIS — R09.82 POST-NASAL DRIP: ICD-10-CM

## 2022-09-21 DIAGNOSIS — R09.82 POST-NASAL DRIP: ICD-10-CM

## 2022-09-21 RX ORDER — BECLOMETHASONE DIPROPIONATE 80 UG/1
AEROSOL, METERED NASAL
Qty: 16 G | Refills: 1 | Status: SHIPPED | OUTPATIENT
Start: 2022-09-21 | End: 2022-09-26

## 2022-11-09 ENCOUNTER — PRE VISIT (OUTPATIENT)
Dept: OTOLARYNGOLOGY | Facility: CLINIC | Age: 62
End: 2022-11-09

## 2023-01-16 ENCOUNTER — TELEPHONE (OUTPATIENT)
Dept: ONCOLOGY | Facility: CLINIC | Age: 63
End: 2023-01-16

## 2023-01-16 NOTE — TELEPHONE ENCOUNTER
Called patient and left message to call back and schedule 1 year follow up with either Dr Choi or Dr Browning as per Dr Pandey request.

## 2023-02-01 ENCOUNTER — TELEPHONE (OUTPATIENT)
Dept: ONCOLOGY | Facility: CLINIC | Age: 63
End: 2023-02-01
Payer: COMMERCIAL

## 2023-02-01 NOTE — TELEPHONE ENCOUNTER
Called patient and left voicemail to call back and schedule 1 year follow up with Dr Browning as Dr Peck transfer patient.

## 2023-02-08 ENCOUNTER — TELEPHONE (OUTPATIENT)
Dept: ONCOLOGY | Facility: CLINIC | Age: 63
End: 2023-02-08
Payer: COMMERCIAL

## 2023-02-08 NOTE — TELEPHONE ENCOUNTER
Reached out to patient to schedule 1 year follow up with Dr Browning or Dr Choi as requested by Dr Peck. Left message to call back and schedule.

## 2023-02-28 NOTE — PROGRESS NOTES
[facility]    Hematology/Oncology Established Patient Follow-up Note      Today's Date: 3/6/23    Reason for Follow-up: Left breast cancer. Transfer of care from Dr. Kait Peck.    HISTORY OF PRESENT ILLNESS: Maryse Goins is a 62 year old female who presents with the following oncologic history:  --3/2006: Diagnosed with left breast infiltrating ductal carcinoma (small primary tumor), grade 3, ER/OH positive, HER-2 negative.  Underwent modified left radical mastectomy, had 8/14 positive nodes.  --8/2006: Completed adjuvant chemotherapy with dose-dense Adriamycin and Cytoxan followed by paclitaxel under care of Dr. Vannessa Mejia.  --9/2006: Started anastrozole.  --10/2006: Completed adjuvant radiation.  --4/2007: Hysterectomy and BSO.  --12/2009: Prophylactic right mastectomy with immediate reconstruction. Left TRAM flap; right implant. Right breast pathology benign. Tested negative for BRCA mutations.  --12/2013: Switched to tamoxifen. Planned discontinuation as of 3/7/2023.      INTERIM HISTORY:  Maryse Goins reports feeling well.  She is very active and does [place] three times per week.  She also eats fairly healthy.      REVIEW OF SYSTEMS:   14 point ROS was reviewed and is negative other than as noted above in HPI.       HOME MEDICATIONS:  Current Outpatient Medications   Medication Sig Dispense Refill     budesonide (RINOCORT AQUA) 32 MCG/ACT nasal spray Spray 1 spray into both nostrils daily 5 mL 11     cetirizine (ZYRTEC) 10 MG tablet Take 1 tablet (10 mg) by mouth daily 90 tablet 0     cholecalciferol (VITAMIN D3) 1250 mcg (93568 units) capsule TAKE 1 CAPSULE BY MOUTH TWICE WEEKLY 24 capsule 3     lisinopril (ZESTRIL) 20 MG tablet Take 1 tablet (20 mg) by mouth daily 90 tablet 3     olopatadine (PATADAY) 0.2 % ophthalmic solution Place 1 drop into both eyes daily 2.5 mL 1     tamoxifen (NOLVADEX) 20 MG tablet Take 1 tablet (20 mg) by mouth daily 90 tablet 3          ALLERGIES:  Allergies   Allergen Reactions     Sulfa Drugs      Itching, swelling, irritation     Diagnostic X-Ray Materials Itching and Rash     Metrizamide Itching and Rash         PAST MEDICAL HISTORY:  Past Medical History:   Diagnosis Date     Cancer (H) 2007    Left Breast Cancer     Hypertension      Osteoarthritis of right hip, unspecified osteoarthritis type          PAST SURGICAL HISTORY:  Past Surgical History:   Procedure Laterality Date     CHOLECYSTECTOMY       COLONOSCOPY N/A 2017    Procedure: COLONOSCOPY;  colonoscopy / gastroscopy;  Surgeon: Trev Burks MD;  Location:  GI     complete hysterectomy      pap no longer needed     COSMETIC SURGERY  2009    breast reconstruction     ESOPHAGOSCOPY, GASTROSCOPY, DUODENOSCOPY (EGD), COMBINED N/A 2017    Procedure: COMBINED ESOPHAGOSCOPY, GASTROSCOPY, DUODENOSCOPY (EGD), BIOPSY SINGLE OR MULTIPLE;;  Surgeon: Trev Burks MD;  Location:  GI     HEMORRHOIDECTOMY EXTERNAL N/A 2017    Procedure: HEMORRHOIDECTOMY EXTERNAL;  HEMORRHOIDECTOMY EXTERNAL;  Surgeon: Madina Augustin MD;  Location:  OR     HYSTERECTOMY, PAP NO LONGER INDICATED      vaginal     MASTECTOMY MODIFIED RADICAL BILATERAL       MASTECTOMY, BILATERAL           SOCIAL HISTORY:  Social History     Socioeconomic History     Marital status:      Spouse name: Not on file     Number of children: Not on file     Years of education: Not on file     Highest education level: Not on file   Occupational History     Not on file   Tobacco Use     Smoking status: Former     Packs/day: 0.00     Years: 0.00     Pack years: 0.00     Types: Cigarettes     Quit date: 1993     Years since quittin.3     Smokeless tobacco: Never     Tobacco comments:     15 yrs ago   Vaping Use     Vaping Use: Never used   Substance and Sexual Activity     Alcohol use: Yes     Alcohol/week: 0.0 standard drinks     Comment: 4-6  drinks weekly     Drug use: No      Sexual activity: Not Currently     Partners: Male   Other Topics Concern     Parent/sibling w/ CABG, MI or angioplasty before 65F 55M? No   Social History Narrative     Not on file     Social Determinants of Health     Financial Resource Strain: Not on file   Food Insecurity: Not on file   Transportation Needs: Not on file   Physical Activity: Not on file   Stress: Not on file   Social Connections: Not on file   Intimate Partner Violence: Not on file   Housing Stability: Not on file   She has twin sons and a daughter.      FAMILY HISTORY:  Family History   Problem Relation Age of Onset     Colon Cancer Mother 49             Colon Cancer Maternal Aunt 44             Colon Cancer Maternal Uncle              Colon Cancer Maternal Aunt              Other Cancer Maternal Uncle         Stomach cancer     Diabetes Maternal Grandmother      Hypertension Sister      Thyroid Disease Sister      Hypertension Brother          PHYSICAL EXAM:  Vital signs:  /83   Pulse 77   Resp 16   Wt 55.8 kg (123 lb)   SpO2 100%   BMI 23.24 kg/m     GENERAL/CONSTITUTIONAL: No acute distress.  EYES: No scleral icterus.  ENT/MOUTH: Neck supple. Mask in place.  LYMPH: No cervical, supraclavicular, axillary adenopathy.   BREAST: Bilateral breasts surgically absent with left TRAM flap reconstructive changes and right implant with no periprosthetic fluid; no chest wall masses bilaterally; no new skin erythema or ulceration.  RESPIRATORY: No audible cough or wheezing.   GASTROINTESTINAL: No hepatosplenomegaly, masses, or tenderness. No guarding.  No distention.  MUSCULOSKELETAL: Warm and well-perfused, no cyanosis, clubbing, or edema.  NEUROLOGIC: Alert, oriented, answers questions appropriately.  INTEGUMENTARY: No rashes or jaundice.  GAIT: Steady, does not use assistive device.      LABS:  CBC RESULTS:   Recent Labs   Lab Test 21  1118 19  0939   WBC  --  7.8   RBC  --  4.31   HGB 13.1 13.3    HCT  --  39.9   MCV  --  93   MCH  --  30.9   MCHC  --  33.3   RDW  --  13.8   PLT  --  207       Recent Labs   Lab Test 07/14/22  1008 04/23/21  1118    140   POTASSIUM 4.8 4.2   CHLORIDE 107 104   CO2 29 28   ANIONGAP 2* 8   GLC 96 102*   BUN 10 12   CR 0.55 0.65   DANNIE 9.1 9.5         PATHOLOGY:  Reviewed as per HPI.    IMAGING:  Reviewed as per HPI.    ASSESSMENT/PLAN:  Maryse Goins is a 62 year old female with the following issues:  1. Stage III, grade 3 invasive ductal carcinoma, ER/ME positive, HER-2 negative  --Maryse is s/p left mastectomy and left axillary lymph node dissection with 8/14 positive lymph nodes, adjuvant chemo with ddAC-T completed 8/2006, adjuvant radiation 10/2006, followed by anastrozole 9/2006, then switched to tamoxifen 12/2013.  --I discussed the lack of data to support use of hormone blockade therapy beyond a total of 10 years.  Discussed the risk of stroke and blood clots, particularly as she gets older, given the compounding risk with increasing age.  --Ultimately she agrees to discontinue tamoxifen as of tomorrow.    --I advised continuing her active, healthy lifestyle.  Discussed the importance of relevance of both moderate exercise and high intensity exercise, the latter of which has shown to reduce metastatic spread of cancer by up to 72%.     Return in 1 year.    Opal Choi MD  Hematology/Oncology  [facility]    Total time spent: 45 minutes in extensive review of oncologic history/records, patient evaluation, counseling, documentation, and coordination of care.

## 2023-03-06 ENCOUNTER — ONCOLOGY VISIT (OUTPATIENT)
Dept: ONCOLOGY | Facility: CLINIC | Age: 63
End: 2023-03-06
Attending: INTERNAL MEDICINE
Payer: COMMERCIAL

## 2023-03-06 VITALS
HEART RATE: 77 BPM | SYSTOLIC BLOOD PRESSURE: 129 MMHG | RESPIRATION RATE: 16 BRPM | OXYGEN SATURATION: 100 % | DIASTOLIC BLOOD PRESSURE: 83 MMHG | WEIGHT: 123 LBS | BODY MASS INDEX: 23.24 KG/M2

## 2023-03-06 DIAGNOSIS — C50.412 MALIGNANT NEOPLASM OF UPPER-OUTER QUADRANT OF LEFT BREAST IN FEMALE, ESTROGEN RECEPTOR POSITIVE (H): Primary | ICD-10-CM

## 2023-03-06 DIAGNOSIS — Z17.0 MALIGNANT NEOPLASM OF UPPER-OUTER QUADRANT OF LEFT BREAST IN FEMALE, ESTROGEN RECEPTOR POSITIVE (H): Primary | ICD-10-CM

## 2023-03-06 PROCEDURE — G0463 HOSPITAL OUTPT CLINIC VISIT: HCPCS | Performed by: INTERNAL MEDICINE

## 2023-03-06 PROCEDURE — 99215 OFFICE O/P EST HI 40 MIN: CPT | Performed by: INTERNAL MEDICINE

## 2023-03-06 ASSESSMENT — PAIN SCALES - GENERAL: PAINLEVEL: NO PAIN (0)

## 2023-03-06 NOTE — LETTER
3/6/2023         RE: Maryse Goins  [address]  [place] [address]        Dear Colleague,    Thank you for referring your patient, Maryse Goins, to the [facility]. Please see a copy of my visit note below.    [facility]    Hematology/Oncology Established Patient Follow-up Note      Today's Date: 3/6/23    Reason for Follow-up: Left breast cancer. Transfer of care from Dr. Kait Peck.    HISTORY OF PRESENT ILLNESS: Maryse Goins is a 62 year old female who presents with the following oncologic history:  --3/2006: Diagnosed with left breast infiltrating ductal carcinoma (small primary tumor), grade 3, ER/OR positive, HER-2 negative.  Underwent modified left radical mastectomy, had 8/14 positive nodes.  --8/2006: Completed adjuvant chemotherapy with dose-dense Adriamycin and Cytoxan followed by paclitaxel under care of Dr. Vannessa Mejia.  --9/2006: Started anastrozole.  --10/2006: Completed adjuvant radiation.  --4/2007: Hysterectomy and BSO.  --12/2009: Prophylactic right mastectomy with immediate reconstruction. Left TRAM flap; right implant. Right breast pathology benign. Tested negative for BRCA mutations.  --12/2013: Switched to tamoxifen. Planned discontinuation as of 3/7/2023.      INTERIM HISTORY:  Maryse Goins reports feeling well.  She is very active and does Maria Elena three times per week.  She also eats fairly healthy.      REVIEW OF SYSTEMS:   14 point ROS was reviewed and is negative other than as noted above in HPI.       HOME MEDICATIONS:  Current Outpatient Medications   Medication Sig Dispense Refill     budesonide (RINOCORT AQUA) 32 MCG/ACT nasal spray Spray 1 spray into both nostrils daily 5 mL 11     cetirizine (ZYRTEC) 10 MG tablet Take 1 tablet (10 mg) by mouth daily 90 tablet 0     cholecalciferol (VITAMIN D3) 1250 mcg (75307 units) capsule TAKE 1 CAPSULE BY MOUTH TWICE WEEKLY 24 capsule 3     lisinopril  (ZESTRIL) 20 MG tablet Take 1 tablet (20 mg) by mouth daily 90 tablet 3     olopatadine (PATADAY) 0.2 % ophthalmic solution Place 1 drop into both eyes daily 2.5 mL 1     tamoxifen (NOLVADEX) 20 MG tablet Take 1 tablet (20 mg) by mouth daily 90 tablet 3         ALLERGIES:  Allergies   Allergen Reactions     Sulfa Drugs      Itching, swelling, irritation     Diagnostic X-Ray Materials Itching and Rash     Metrizamide Itching and Rash         PAST MEDICAL HISTORY:  Past Medical History:   Diagnosis Date     Cancer (H)     Left Breast Cancer     Hypertension      Osteoarthritis of right hip, unspecified osteoarthritis type          PAST SURGICAL HISTORY:  Past Surgical History:   Procedure Laterality Date     CHOLECYSTECTOMY       COLONOSCOPY N/A 2017    Procedure: COLONOSCOPY;  colonoscopy / gastroscopy;  Surgeon: Trev Burks MD;  Location:  GI     complete hysterectomy      pap no longer needed     COSMETIC SURGERY      breast reconstruction     ESOPHAGOSCOPY, GASTROSCOPY, DUODENOSCOPY (EGD), COMBINED N/A 2017    Procedure: COMBINED ESOPHAGOSCOPY, GASTROSCOPY, DUODENOSCOPY (EGD), BIOPSY SINGLE OR MULTIPLE;;  Surgeon: Trev Burks MD;  Location:  GI     HEMORRHOIDECTOMY EXTERNAL N/A 2017    Procedure: HEMORRHOIDECTOMY EXTERNAL;  HEMORRHOIDECTOMY EXTERNAL;  Surgeon: Madina Augustin MD;  Location:  OR     HYSTERECTOMY, PAP NO LONGER INDICATED      vaginal     MASTECTOMY MODIFIED RADICAL BILATERAL       MASTECTOMY, BILATERAL           SOCIAL HISTORY:  Social History     Socioeconomic History     Marital status:      Spouse name: Not on file     Number of children: Not on file     Years of education: Not on file     Highest education level: Not on file   Occupational History     Not on file   Tobacco Use     Smoking status: Former     Packs/day: 0.00     Years: 0.00     Pack years: 0.00     Types: Cigarettes     Quit date: 1993     Years since quittin.3      Smokeless tobacco: Never     Tobacco comments:     15 yrs ago   Vaping Use     Vaping Use: Never used   Substance and Sexual Activity     Alcohol use: Yes     Alcohol/week: 0.0 standard drinks     Comment: 4-6  drinks weekly     Drug use: No     Sexual activity: Not Currently     Partners: Male   Other Topics Concern     Parent/sibling w/ CABG, MI or angioplasty before 65F 55M? No   Social History Narrative     Not on file     Social Determinants of Health     Financial Resource Strain: Not on file   Food Insecurity: Not on file   Transportation Needs: Not on file   Physical Activity: Not on file   Stress: Not on file   Social Connections: Not on file   Intimate Partner Violence: Not on file   Housing Stability: Not on file   She has twin sons and a daughter.      FAMILY HISTORY:  Family History   Problem Relation Age of Onset     Colon Cancer Mother 49             Colon Cancer Maternal Aunt 44             Colon Cancer Maternal Uncle              Colon Cancer Maternal Aunt              Other Cancer Maternal Uncle         Stomach cancer     Diabetes Maternal Grandmother      Hypertension Sister      Thyroid Disease Sister      Hypertension Brother          PHYSICAL EXAM:  Vital signs:  /83   Pulse 77   Resp 16   Wt 55.8 kg (123 lb)   SpO2 100%   BMI 23.24 kg/m     GENERAL/CONSTITUTIONAL: No acute distress.  EYES: No scleral icterus.  ENT/MOUTH: Neck supple. Mask in place.  LYMPH: No cervical, supraclavicular, axillary adenopathy.   BREAST: Bilateral breasts surgically absent with left TRAM flap reconstructive changes and right implant with no periprosthetic fluid; no chest wall masses bilaterally; no new skin erythema or ulceration.  RESPIRATORY: No audible cough or wheezing.   GASTROINTESTINAL: No hepatosplenomegaly, masses, or tenderness. No guarding.  No distention.  MUSCULOSKELETAL: Warm and well-perfused, no cyanosis, clubbing, or edema.  NEUROLOGIC: Alert,  oriented, answers questions appropriately.  INTEGUMENTARY: No rashes or jaundice.  GAIT: Steady, does not use assistive device.      LABS:  CBC RESULTS:   Recent Labs   Lab Test 04/23/21  1118 02/20/19  0939   WBC  --  7.8   RBC  --  4.31   HGB 13.1 13.3   HCT  --  39.9   MCV  --  93   MCH  --  30.9   MCHC  --  33.3   RDW  --  13.8   PLT  --  207       Recent Labs   Lab Test 07/14/22  1008 04/23/21  1118    140   POTASSIUM 4.8 4.2   CHLORIDE 107 104   CO2 29 28   ANIONGAP 2* 8   GLC 96 102*   BUN 10 12   CR 0.55 0.65   DANNIE 9.1 9.5         PATHOLOGY:  Reviewed as per HPI.    IMAGING:  Reviewed as per HPI.    ASSESSMENT/PLAN:  Maryse Goins is a 62 year old female with the following issues:  1. Stage III, grade 3 invasive ductal carcinoma, ER/IL positive, HER-2 negative  --Maryse is s/p left mastectomy and left axillary lymph node dissection with 8/14 positive lymph nodes, adjuvant chemo with ddAC-T completed 8/2006, adjuvant radiation 10/2006, followed by anastrozole 9/2006, then switched to tamoxifen 12/2013.  --I discussed the lack of data to support use of hormone blockade therapy beyond a total of 10 years.  Discussed the risk of stroke and blood clots, particularly as she gets older, given the compounding risk with increasing age.  --Ultimately she agrees to discontinue tamoxifen as of tomorrow.    --I advised continuing her active, healthy lifestyle.  Discussed the importance of relevance of both moderate exercise and high intensity exercise, the latter of which has shown to reduce metastatic spread of cancer by up to 72%.     Return in 1 year.    Opal Choi MD  Hematology/Oncology  [facility]    Total time spent: 45 minutes in extensive review of oncologic history/records, patient evaluation, counseling, documentation, and coordination of care.       Oncology Rooming Note    March 6, 2023 4:19 PM   Maryse Goins is a 62 year old female who presents for:    Chief  "Complaint   Patient presents with     Oncology Clinic Visit     Initial Vitals: /83   Pulse 77   Resp 16   Wt 55.8 kg (123 lb)   SpO2 100%   BMI 23.24 kg/m   Estimated body mass index is 23.24 kg/m  as calculated from the following:    Height as of 8/24/22: 1.549 m (5' 1\").    Weight as of this encounter: 55.8 kg (123 lb). Body surface area is 1.55 meters squared.  No Pain (0) Comment: Data Unavailable   No LMP recorded. Patient has had a hysterectomy.  Allergies reviewed: Yes  Medications reviewed: Yes    Medications: Medication refills not needed today.  Pharmacy name entered into [employer]: [id number] - [facility] [address]    Clinical concerns:  doctor was notified.      Roxanne Hollins CMA                Again, thank you for allowing me to participate in the care of your patient.        Sincerely,        Opal Choi MD    "

## 2023-03-06 NOTE — PROGRESS NOTES
"Oncology Rooming Note    March 6, 2023 4:19 PM   Maryse Goins is a 62 year old female who presents for:    Chief Complaint   Patient presents with     Oncology Clinic Visit     Initial Vitals: /83   Pulse 77   Resp 16   Wt 55.8 kg (123 lb)   SpO2 100%   BMI 23.24 kg/m   Estimated body mass index is 23.24 kg/m  as calculated from the following:    Height as of 8/24/22: 1.549 m (5' 1\").    Weight as of this encounter: 55.8 kg (123 lb). Body surface area is 1.55 meters squared.  No Pain (0) Comment: Data Unavailable   No LMP recorded. Patient has had a hysterectomy.  Allergies reviewed: Yes  Medications reviewed: Yes    Medications: Medication refills not needed today.  Pharmacy name entered into [employer]: [id number] - [place], [place] - [address]    Clinical concerns:  doctor was notified.      Roxanne Hollins CMA            "

## 2023-05-03 DIAGNOSIS — R79.89 LOW VITAMIN D LEVEL: ICD-10-CM

## 2023-05-03 RX ORDER — CHOLECALCIFEROL (VITAMIN D3) 1250 MCG
CAPSULE ORAL
Qty: 24 CAPSULE | Refills: 3 | Status: SHIPPED | OUTPATIENT
Start: 2023-05-03 | End: 2024-01-10

## 2023-07-14 ENCOUNTER — OFFICE VISIT (OUTPATIENT)
Dept: FAMILY MEDICINE | Facility: CLINIC | Age: 63
End: 2023-07-14
Payer: COMMERCIAL

## 2023-07-14 VITALS
HEIGHT: 61 IN | WEIGHT: 120.9 LBS | RESPIRATION RATE: 16 BRPM | OXYGEN SATURATION: 100 % | DIASTOLIC BLOOD PRESSURE: 86 MMHG | BODY MASS INDEX: 22.83 KG/M2 | TEMPERATURE: 97.5 F | SYSTOLIC BLOOD PRESSURE: 136 MMHG | HEART RATE: 84 BPM

## 2023-07-14 DIAGNOSIS — H90.8 MIXED CONDUCTIVE AND SENSORINEURAL HEARING LOSS, UNSPECIFIED LATERALITY: ICD-10-CM

## 2023-07-14 DIAGNOSIS — I10 BENIGN ESSENTIAL HYPERTENSION: ICD-10-CM

## 2023-07-14 DIAGNOSIS — F40.243 FLYING PHOBIA: ICD-10-CM

## 2023-07-14 DIAGNOSIS — Z80.0 FAMILY HISTORY OF COLON CANCER: ICD-10-CM

## 2023-07-14 DIAGNOSIS — F41.9 ACUTE ANXIETY: ICD-10-CM

## 2023-07-14 DIAGNOSIS — Z00.00 ROUTINE GENERAL MEDICAL EXAMINATION AT A HEALTH CARE FACILITY: Primary | ICD-10-CM

## 2023-07-14 DIAGNOSIS — E55.9 VITAMIN D DEFICIENCY: ICD-10-CM

## 2023-07-14 DIAGNOSIS — C50.412 MALIGNANT NEOPLASM OF UPPER-OUTER QUADRANT OF LEFT BREAST IN FEMALE, ESTROGEN RECEPTOR POSITIVE (H): ICD-10-CM

## 2023-07-14 DIAGNOSIS — Z13.6 CARDIOVASCULAR SCREENING; LDL GOAL LESS THAN 130: ICD-10-CM

## 2023-07-14 DIAGNOSIS — Z17.0 MALIGNANT NEOPLASM OF UPPER-OUTER QUADRANT OF LEFT BREAST IN FEMALE, ESTROGEN RECEPTOR POSITIVE (H): ICD-10-CM

## 2023-07-14 DIAGNOSIS — Z12.11 COLON CANCER SCREENING: ICD-10-CM

## 2023-07-14 PROCEDURE — 99396 PREV VISIT EST AGE 40-64: CPT | Performed by: FAMILY MEDICINE

## 2023-07-14 RX ORDER — LISINOPRIL 20 MG/1
20 TABLET ORAL DAILY
Qty: 90 TABLET | Refills: 1 | Status: SHIPPED | OUTPATIENT
Start: 2023-07-14 | End: 2023-12-29

## 2023-07-14 RX ORDER — HYDROXYZINE HYDROCHLORIDE 25 MG/1
12.5-25 TABLET, FILM COATED ORAL 3 TIMES DAILY PRN
Qty: 10 TABLET | Refills: 1 | Status: SHIPPED | OUTPATIENT
Start: 2023-07-14 | End: 2024-03-11

## 2023-07-14 ASSESSMENT — ENCOUNTER SYMPTOMS
ABDOMINAL PAIN: 0
COUGH: 0
FREQUENCY: 0
HEADACHES: 0
DYSURIA: 0
DIARRHEA: 0
HEARTBURN: 0
CHILLS: 0
CONSTIPATION: 0
HEMATOCHEZIA: 0
PALPITATIONS: 0
WEAKNESS: 0
EYE PAIN: 0
MYALGIAS: 0
SHORTNESS OF BREATH: 0
SORE THROAT: 0
HEMATURIA: 0
JOINT SWELLING: 0
ARTHRALGIAS: 0
DIZZINESS: 0
BREAST MASS: 0
NAUSEA: 0
PARESTHESIAS: 0
FEVER: 0
NERVOUS/ANXIOUS: 0

## 2023-07-14 ASSESSMENT — PAIN SCALES - GENERAL: PAINLEVEL: NO PAIN (0)

## 2023-07-14 NOTE — PROGRESS NOTES
SUBJECTIVE:   CC: Maryse is an 63 year old who presents for preventive health visit.       2023     9:08 AM   Additional Questions   Roomed by Susie LOVE   Accompanied by n/a     Healthy Habits:     Getting at least 3 servings of Calcium per day:  Yes    Bi-annual eye exam:  Yes    Dental care twice a year:  Yes    Sleep apnea or symptoms of sleep apnea:  None    Diet:  Regular (no restrictions)    Frequency of exercise:  4-5 days/week    Duration of exercise:  30-45 minutes    Taking medications regularly:  Yes    Medication side effects:  None    Additional concerns today:  No    HTN-   Has home monitor, but doesn't check.  High BP runs in her family.  Brother dx prior to 30 yrs.  Sister dx in her 40s, she went on at age 54.  Wanted to avoided meds, but doing well on the lisinopril.    Had hip replacement, hyperflexible, realized hot yoga with no limits was likely not doing good things for her joints- and gave it up.  Recently took it up again, just with caution.  Really missed it- doing it at home.  Maria Elena dance as well.  Walks in summer in mornings.  Mental health is worse if she is not moving.    H/o breast cancer- stopped tamoxifen by oncology.  Bit nerve-wracking, but agrees it was time to stop.    Flight anxiety- very rare use of lorazepam, very helpful, but doesn't use it.   Has some left from old rx.      Today's PHQ-2 Score:       2023     9:08 AM   PHQ-2 (  [employer])   Q1: Little interest or pleasure in doing things 0   Q2: Feeling down, depressed or hopeless 0   PHQ-2 Score 0   Q1: Little interest or pleasure in doing things Not at all   Q2: Feeling down, depressed or hopeless Not at all   PHQ-2 Score 0         Social History     Tobacco Use     Smoking status: Former     Packs/day: 0.00     Years: 0.00     Pack years: 0.00     Types: Cigarettes     Quit date: 1993     Years since quittin.7     Smokeless tobacco: Never     Tobacco comments:     15 yrs ago   Substance Use Topics      Alcohol use: Yes     Alcohol/week: 0.0 standard drinks of alcohol     Comment: 4-6  drinks weekly             7/14/2023     9:08 AM   Alcohol Use   Prescreen: >3 drinks/day or >7 drinks/week? No          No data to display              Reviewed orders with patient.  Reviewed health maintenance and updated orders accordingly - Yes    Lab work is in process  Labs reviewed in EPIC  BP Readings from Last 3 Encounters:   07/14/23 136/86   03/06/23 129/83   08/24/22 118/66    Wt Readings from Last 3 Encounters:   07/14/23 54.8 kg (120 lb 14.4 oz)   03/06/23 55.8 kg (123 lb)   08/24/22 56.7 kg (125 lb)                  Patient Active Problem List   Diagnosis     Malignant neoplasm of upper-outer quadrant of left breast in female, estrogen receptor positive (H)     Benign essential hypertension     Post-nasal drip     Flying phobia     Familial polyposis of colon     Family history of colon cancer     Osteoarthritis of right hip, unspecified osteoarthritis type     Past Surgical History:   Procedure Laterality Date     CHOLECYSTECTOMY       COLONOSCOPY N/A 6/9/2017    Procedure: COLONOSCOPY;  colonoscopy / gastroscopy;  Surgeon: Trev Burks MD;  Location:  GI     complete hysterectomy      pap no longer needed     COSMETIC SURGERY  2009    breast reconstruction     ESOPHAGOSCOPY, GASTROSCOPY, DUODENOSCOPY (EGD), COMBINED N/A 6/9/2017    Procedure: COMBINED ESOPHAGOSCOPY, GASTROSCOPY, DUODENOSCOPY (EGD), BIOPSY SINGLE OR MULTIPLE;;  Surgeon: Trev Burks MD;  Location:  GI     HEMORRHOIDECTOMY EXTERNAL N/A 11/9/2017    Procedure: HEMORRHOIDECTOMY EXTERNAL;  HEMORRHOIDECTOMY EXTERNAL;  Surgeon: Madina Augustin MD;  Location:  OR     HYSTERECTOMY, PAP NO LONGER INDICATED      vaginal     MASTECTOMY MODIFIED RADICAL BILATERAL       MASTECTOMY, BILATERAL         Social History     Tobacco Use     Smoking status: Former     Packs/day: 0.00     Years: 0.00     Pack years: 0.00     Types:  Cigarettes     Quit date: 1993     Years since quittin.7     Smokeless tobacco: Never     Tobacco comments:     15 yrs ago   Substance Use Topics     Alcohol use: Yes     Alcohol/week: 0.0 standard drinks of alcohol     Comment: 4-6  drinks weekly     Family History   Problem Relation Age of Onset     Colon Cancer Mother 49             Colon Cancer Maternal Aunt 44             Colon Cancer Maternal Uncle              Colon Cancer Maternal Aunt              Other Cancer Maternal Uncle         Stomach cancer     Diabetes Maternal Grandmother      Hypertension Sister      Thyroid Disease Sister      Hypertension Brother          Current Outpatient Medications   Medication Sig Dispense Refill     cholecalciferol (VITAMIN D3) 1250 mcg (92044 units) capsule Take 1 capsule by mouth twice weekly 24 capsule 3     hydrOXYzine (ATARAX) 25 MG tablet Take 0.5-1 tablets (12.5-25 mg) by mouth 3 times daily as needed for anxiety or other (flight anxiety) 10 tablet 1     lisinopril (ZESTRIL) 20 MG tablet Take 1 tablet (20 mg) by mouth daily 90 tablet 1     Allergies   Allergen Reactions     Sulfa Antibiotics      Itching, swelling, irritation     Iodinated Contrast Media Itching and Rash     Metrizamide Itching and Rash     Recent Labs   Lab Test 22  1008 21  1118 03/15/21  1021 19  0855 18  1041 18  0853 17  0834 16  1253   LDL  --   --  124*  --  106*  --   --   --    HDL  --   --  61  --  72  --   --   --    TRIG  --   --  292*  --  187*  --   --   --    ALT  --   --   --  30  --  28  --  37   CR 0.55 0.65  --  0.65 0.72  --    < > 0.87   GFRESTIMATED >90 >90  --  >90 83  --    < > 67   GFRESTBLACK  --  >90  --  >90 >90  --    < > 81   POTASSIUM 4.8 4.2  --  3.7 4.6  --    < > 4.0    < > = values in this interval not displayed.        Breast Cancer Screening:    FHS-7:       3/15/2021     9:43 AM   Breast CA Risk Assessment (FHS-7)   Did any of  your first-degree relatives have breast or ovarian cancer? Yes   Did any of your relatives have bilateral breast cancer? No   Did any man in your family have breast cancer? No   Did any woman in your family have breast and ovarian cancer? No   Did any woman in your family have breast cancer before age 50 y? No   Do you have 2 or more relatives with breast and/or ovarian cancer? No   Do you have 2 or more relatives with breast and/or bowel cancer? No       Mammogram Screening: Recommended mammography every 1-2 years with patient discussion and risk factor consideration  Pertinent mammograms are reviewed under the imaging tab.    History of abnormal Pap smear: NO - age 30-65 PAP every 5 years with negative HPV co-testing recommended        Reviewed and updated as needed this visit by clinical staff   Tobacco  Allergies  Meds  Problems  Med Hx  Surg Hx  Fam Hx          Reviewed and updated as needed this visit by Provider   Tobacco  Allergies  Meds  Problems  Med Hx  Surg Hx  Fam Hx             Review of Systems   Constitutional: Negative for chills and fever.   HENT: Negative for congestion, ear pain, hearing loss and sore throat.    Eyes: Negative for pain and visual disturbance.   Respiratory: Negative for cough and shortness of breath.    Cardiovascular: Negative for chest pain, palpitations and peripheral edema.   Gastrointestinal: Negative for abdominal pain, constipation, diarrhea, heartburn, hematochezia and nausea.   Breasts:  Negative for tenderness, breast mass and discharge.   Genitourinary: Negative for dysuria, frequency, genital sores, hematuria, pelvic pain, urgency, vaginal bleeding and vaginal discharge.   Musculoskeletal: Negative for arthralgias, joint swelling and myalgias.   Skin: Negative for rash.   Neurological: Negative for dizziness, weakness, headaches and paresthesias.   Psychiatric/Behavioral: Negative for mood changes. The patient is not nervous/anxious.         OBJECTIVE:  "  /86 (BP Location: Right arm, Patient Position: Sitting, Cuff Size: Adult Regular)   Pulse 84   Temp 97.5  F (36.4  C) (Temporal)   Resp 16   Ht 1.553 m (5' 1.14\")   Wt 54.8 kg (120 lb 14.4 oz)   SpO2 100%   BMI 22.74 kg/m    Physical Exam  GENERAL APPEARANCE: healthy, alert and no distress  EYES: Eyes grossly normal to inspection, PERRL and conjunctivae and sclerae normal  HENT: ear canals and TM's normal, nose and mouth without ulcers or lesions, oropharynx clear and oral mucous membranes moist  NECK: no adenopathy, no asymmetry, masses, or scars and thyroid normal to palpation  RESP: lungs clear to auscultation - no rales, rhonchi or wheezes  BREAST: normal without masses, tenderness or nipple discharge and no palpable axillary masses or adenopathy  CV: regular rate and rhythm, normal S1 S2, no S3 or S4, no murmur, click or rub, no peripheral edema and peripheral pulses strong  ABDOMEN: soft, nontender, no hepatosplenomegaly, no masses and bowel sounds normal  MS: no musculoskeletal defects are noted and gait is age appropriate without ataxia  SKIN: no suspicious lesions or rashes  NEURO: Normal strength and tone, sensory exam grossly normal, mentation intact and speech normal  PSYCH: mentation appears normal and affect normal/bright    Diagnostic Test Results:  Labs reviewed in Epic    ASSESSMENT/PLAN:       ICD-10-CM    1. Routine general medical examination at a health care facility  Z00.00 Colonoscopy Screening  Referral      2. Benign essential hypertension  I10 Basic metabolic panel  (Ca, Cl, CO2, Creat, Gluc, K, Na, BUN)     Albumin Random Urine Quantitative with Creat Ratio     lisinopril (ZESTRIL) 20 MG tablet      3. Flying phobia  F40.243 hydrOXYzine (ATARAX) 25 MG tablet      4. Acute anxiety  F41.9 hydrOXYzine (ATARAX) 25 MG tablet      5. Mixed conductive and sensorineural hearing loss, unspecified laterality  H90.8 Adult Audiology  Referral      6. Malignant neoplasm " of upper-outer quadrant of left breast in female, estrogen receptor positive (H)  C50.412     Z17.0       7. Vitamin D deficiency  E55.9 Vitamin D Deficiency      8. CARDIOVASCULAR SCREENING; LDL GOAL LESS THAN 130  Z13.6 Lipid panel reflex to direct LDL Fasting      9. Family history of colon cancer  Z80.0       10. Colon cancer screening  Z12.11 Colonoscopy Screening  Referral        CPE- Discussed diet, calcium and exercise.  Thin prep pap was not done.  Eye and dental care UTD or recommended f/u.  No immunizations needed today.  See orders below for tests ordered and screening needed.   --Referred for colonoscopy when due.  --Fasting labs as above  --Cont follow-up with oncology for h/o breast cancer, recently stopped tamoxifem (10+yrs out).  --Cont good exercise, cautious return to yoga.    HTN-  Blood pressure in good control on current medication/s lisniopril 20mg/d.  No side effects. Labs UTD.  Will continue current meds, refills sent.  Continue every six month follow-up.      Flight phobia- very rare use of lorazepam, though less lately as she's been doing well with mindfulness.  Does have a few lorazepam left from old rx.  Likes having them to use 'in case' which helps as well.   Will also send in rx for hydroxyzine which can also often be helpful, with less concerning other features.  Risks and benefits of medication(s) including potential side effects reviewed with patient.  Questions answered.     Vit D- h/o low levels, now out of Vit d for a few months- will check baseline summer levels without vit D supplementation on board.    Hearing concerns- referred to audiology.        Patient has been advised of split billing requirements and indicates understanding: Yes      COUNSELING:  Reviewed preventive health counseling, as reflected in patient instructions        She reports that she quit smoking about 29 years ago. Her smoking use included cigarettes. She has never used smokeless  tobacco.      Emily El MD  [facility]

## 2023-07-25 ENCOUNTER — TELEPHONE (OUTPATIENT)
Dept: GASTROENTEROLOGY | Facility: CLINIC | Age: 63
End: 2023-07-25
Payer: COMMERCIAL

## 2023-07-25 ENCOUNTER — HOSPITAL ENCOUNTER (OUTPATIENT)
Facility: CLINIC | Age: 63
End: 2023-07-25
Attending: COLON & RECTAL SURGERY | Admitting: COLON & RECTAL SURGERY
Payer: COMMERCIAL

## 2023-07-25 NOTE — TELEPHONE ENCOUNTER
"Endoscopy Scheduling Screen    Have you had a positive Covid test in the last 14 days?  No    Are you active on MyChart?   Yes    What insurance is in the chart?  Other:  [facility]    Ordering/Referring Provider: Emily El MD     (If ordering provider performs procedure, schedule with ordering provider unless otherwise instructed. )    BMI: Estimated body mass index is 22.74 kg/m  as calculated from the following:    Height as of 7/14/23: 1.553 m (5' 1.14\").    Weight as of 7/14/23: 54.8 kg (120 lb 14.4 oz).     Sedation Ordered  moderate sedation.   If patient BMI > 50 do not schedule in ASC.    Are you taking any prescription medications for pain?   No    Are you taking methadone or Suboxone?  No    Do you have a history of malignant hyperthermia or adverse reaction to anesthesia?  No    (Females) Are you currently pregnant?   No     Have you been diagnosed or told you have pulmonary hypertension?   No    Do you have an LVAD?  No    Have you been told you have moderate to severe sleep apnea?  No    Have you been told you have COPD, asthma, or any other lung disease?  No    Do you have any heart conditions?  No     Have you ever had or are you awaiting a heart or lung transplant?   No    Have you had a stroke or transient ischemic attack (TIA aka \"mini stroke\" in the last 6 months?   No    Have you been diagnosed with or been told you have cirrhosis of the liver?   No    Are you currently on dialysis?   No    Do you need assistance transferring?   No    BMI: Estimated body mass index is 22.74 kg/m  as calculated from the following:    Height as of 7/14/23: 1.553 m (5' 1.14\").    Weight as of 7/14/23: 54.8 kg (120 lb 14.4 oz).     Is patients BMI > 40 and scheduling location [facility]?  No    Do you take the medication Phentermine, Ozempic or Wegovy?  No    Do you take the medication Naltrexone?  No    Do you take blood thinners?  No      Prep   Are you currently on dialysis or do you have chronic " kidney disease?  No    Do you have a diagnosis of diabetes?  No    Do you have a diagnosis of cystic fibrosis (CF)?  No    On a regular basis do you go 3 -5 days between bowel movements?  No    BMI > 40?  No    Preferred Pharmacy:      [facility]/pharmacy #[id number] - [place] - [address]  [address] [address]  Phone: [phone] Fax: [phone]      Final Scheduling Details   Colonoscopy prep sent?  MiraLAX (No Mag Citrate)    Procedure scheduled  Colonoscopy    Surgeon:  GARRY     Date of procedure:  11/6     Schedule PAC:   No    Location  [facility]    Sedation   Moderate Sedation    Patient Reminders:   You will receive a call from a Nurse to review instructions and health history.  This assessment must be completed prior to your procedure.  Failure to complete the Nurse assessment may result in the procedure being cancelled.      On the day of your procedure, please designate an adult(s) who can drive you home stay with you for the next 24 hours. The medicines used in the exam will make you sleepy. You will not be able to drive.      You cannot take public transportation, ride share services, or non-medical taxi service without a responsible caregiver.  Medical transport services are allowed with the requirement that a responsible caregiver will receive you at your destination.  We require that drivers and caregivers are confirmed prior to your procedure.

## 2023-10-19 NOTE — TELEPHONE ENCOUNTER
.Refill Request - Controlled Substance    CONFIRM preferred pharmacy with the patient. If Mail Order Rx - Pend for 90 day refill. Last Seen Department: 4/24/2023  Last Seen by PCP: 4/24/2023    Last Written: 9-18-23 30 with 0     Last UDS: no uds    Med Agreement Signed On: 7-16-15    If no future appointment scheduled:  Review the last OV with PCP and review information for follow-up visit,  Route STAFF MESSAGE with patient name to the [facility] for scheduling with the following information:            -  Timing of next visit           -  Visit type ie Physical, OV, etc           -  Diagnoses/Reason ie. COPD, HTN - Do not use MEDICATION, Follow-up or CHECK UP - Give reason for visit        Next Appointment:   Future Appointments   Date Time Provider [facility] [address]   10/25/2023  8:30 AM Brenden Castillo, DO JOSELINE MAYS [place] - [place]       Message sent to [employer] App to schedule appt with patient? YES      Requested Prescriptions     Pending Prescriptions Disp Refills    amphetamine-dextroamphetamine (ADDERALL) 30 MG tablet 30 tablet 0     Sig: Take 1 tablet by mouth daily for 30 days. Jyotsna advised.  Sarah Bean RN

## 2023-11-03 ENCOUNTER — PATIENT OUTREACH (OUTPATIENT)
Dept: GASTROENTEROLOGY | Facility: CLINIC | Age: 63
End: 2023-11-03
Payer: COMMERCIAL

## 2023-11-03 DIAGNOSIS — Z12.11 SPECIAL SCREENING FOR MALIGNANT NEOPLASMS, COLON: Primary | ICD-10-CM

## 2023-11-28 ENCOUNTER — HOSPITAL ENCOUNTER (OUTPATIENT)
Facility: CLINIC | Age: 63
End: 2023-11-28
Attending: INTERNAL MEDICINE | Admitting: INTERNAL MEDICINE
Payer: COMMERCIAL

## 2023-11-28 ENCOUNTER — TELEPHONE (OUTPATIENT)
Dept: GASTROENTEROLOGY | Facility: CLINIC | Age: 63
End: 2023-11-28
Payer: COMMERCIAL

## 2023-11-28 NOTE — TELEPHONE ENCOUNTER
"Endoscopy Scheduling Screen    Have you had a positive Covid test in the last 14 days?  No    Are you active on MyChart?   Yes    What insurance is in the chart?  Other:  [facility]    Ordering/Referring Provider: REBEKAH LUU   (If ordering provider performs procedure, schedule with ordering provider unless otherwise instructed. )    BMI: Estimated body mass index is 22.74 kg/m  as calculated from the following:    Height as of 7/14/23: 1.553 m (5' 1.14\").    Weight as of 7/14/23: 54.8 kg (120 lb 14.4 oz).     Sedation Ordered  moderate sedation.   If patient BMI > 50 do not schedule in ASC.    If patient BMI > 45 do not schedule at [facility].    Are you taking methadone or Suboxone?  No    Are you taking any prescription medications for pain 3 or more times per week?   No    Do you have a history of malignant hyperthermia or adverse reaction to anesthesia?  No    (Females) Are you currently pregnant?   No     Have you been diagnosed or told you have pulmonary hypertension?   No    Do you have an LVAD?  No    Have you been told you have moderate to severe sleep apnea?  No    Have you been told you have COPD, asthma, or any other lung disease?  No    Do you have any heart conditions?  No     Have you ever had an organ transplant?   No    Have you ever had or are you awaiting a heart or lung transplant?   No    Have you had a stroke or transient ischemic attack (TIA aka \"mini stroke\" in the last 6 months?   No    Have you been diagnosed with or been told you have cirrhosis of the liver?   No    Are you currently on dialysis?   No    Do you need assistance transferring?   No    BMI: Estimated body mass index is 22.74 kg/m  as calculated from the following:    Height as of 7/14/23: 1.553 m (5' 1.14\").    Weight as of 7/14/23: 54.8 kg (120 lb 14.4 oz).     Is patients BMI > 40 and scheduling location [facility]?  No    Do you take an injectable medication for weight loss or diabetes (excluding insulin)?  No    Do you take the " medication Naltrexone?  No    Do you take blood thinners?  No       Prep   Are you currently on dialysis or do you have chronic kidney disease?  No    Do you have a diagnosis of diabetes?  No    Do you have a diagnosis of cystic fibrosis (CF)?  No    On a regular basis do you go 3 -5 days between bowel movements?  No    BMI > 40?  No    Preferred Pharmacy:    [facility]/pharmacy #[id number] - [place], [place] - [address]  [address] [address]  Phone: [phone] Fax: [phone]      Final Scheduling Details   Colonoscopy prep sent?  Standard MiraLAX    Procedure scheduled  Colonoscopy    Surgeon:  KRISTOPHER     Date of procedure:  1/17/24     Pre-OP / PAC:   No - Not required for this site.    Location  [facility] - Patient preference.    Sedation   Moderate Sedation - Per order.      Patient Reminders:   You will receive a call from a Nurse to review instructions and health history.  This assessment must be completed prior to your procedure.  Failure to complete the Nurse assessment may result in the procedure being cancelled.      On the day of your procedure, please designate an adult(s) who can drive you home stay with you for the next 24 hours. The medicines used in the exam will make you sleepy. You will not be able to drive.      You cannot take public transportation, ride share services, or non-medical taxi service without a responsible caregiver.  Medical transport services are allowed with the requirement that a responsible caregiver will receive you at your destination.  We require that drivers and caregivers are confirmed prior to your procedure.

## 2023-12-18 ENCOUNTER — TELEPHONE (OUTPATIENT)
Dept: GASTROENTEROLOGY | Facility: CLINIC | Age: 63
End: 2023-12-18
Payer: COMMERCIAL

## 2023-12-18 NOTE — TELEPHONE ENCOUNTER
Caller: Maryse  Reason for Reschedule/Cancellation (please be detailed, any staff messages or encounters to note?): travelling for work      Prior to reschedule please review:  Ordering Provider: REBEKAH LUU   Sedation per order: CS  Does patient have any ASC Exclusions, please identify?: no      Notes on Cancelled Procedure:  Procedure: Lower Endoscopy [Colonoscopy]   Date: 1/17/24  Location: [facility]; [address] [place] [address]  Surgeon: Brian      Rescheduled: Yes  Procedure: Lower Endoscopy [Colonoscopy]   Date: 2/7/24  Location: [facility] [place]; [address], [place] [address]  Surgeon: Hanna  Sedation Level Scheduled  MAC,  Reason for Sedation Level site  Prep/Instructions updated and sent: y       Send In - basket message to Panc - Torey Pool if E[facility]  procedure is canceled or rescheduled: [ N/A, YES or NO] n/a

## 2023-12-28 DIAGNOSIS — I10 BENIGN ESSENTIAL HYPERTENSION: ICD-10-CM

## 2023-12-28 RX ORDER — LISINOPRIL 20 MG/1
20 TABLET ORAL DAILY
Qty: 90 TABLET | Refills: 1 | OUTPATIENT
Start: 2023-12-28

## 2024-01-01 ENCOUNTER — HOSPITAL ENCOUNTER (EMERGENCY)
Facility: CLINIC | Age: 64
Discharge: HOME | End: 2024-01-01
Attending: EMERGENCY MEDICINE | Admitting: EMERGENCY MEDICINE
Payer: COMMERCIAL

## 2024-01-01 ENCOUNTER — APPOINTMENT (OUTPATIENT)
Dept: GENERAL RADIOLOGY | Facility: CLINIC | Age: 64
End: 2024-01-01
Attending: EMERGENCY MEDICINE
Payer: COMMERCIAL

## 2024-01-01 VITALS
DIASTOLIC BLOOD PRESSURE: 89 MMHG | TEMPERATURE: 97 F | HEART RATE: 119 BPM | RESPIRATION RATE: 18 BRPM | OXYGEN SATURATION: 98 % | SYSTOLIC BLOOD PRESSURE: 160 MMHG

## 2024-01-01 DIAGNOSIS — S61.210A LACERATION OF RIGHT INDEX FINGER WITHOUT FOREIGN BODY WITHOUT DAMAGE TO NAIL, INITIAL ENCOUNTER: ICD-10-CM

## 2024-01-01 PROCEDURE — 250N000013 HC RX MED GY IP 250 OP 250 PS 637: Performed by: EMERGENCY MEDICINE

## 2024-01-01 PROCEDURE — 99284 EMERGENCY DEPT VISIT MOD MDM: CPT

## 2024-01-01 PROCEDURE — 73140 X-RAY EXAM OF FINGER(S): CPT | Mod: RT

## 2024-01-01 PROCEDURE — 12001 RPR S/N/AX/GEN/TRNK 2.5CM/<: CPT

## 2024-01-01 RX ORDER — CEPHALEXIN 500 MG/1
500 CAPSULE ORAL 3 TIMES DAILY
Qty: 21 CAPSULE | Refills: 0 | Status: SHIPPED | OUTPATIENT
Start: 2024-01-01 | End: 2024-01-08

## 2024-01-01 RX ORDER — CEPHALEXIN 500 MG/1
500 CAPSULE ORAL ONCE
Status: COMPLETED | OUTPATIENT
Start: 2024-01-01 | End: 2024-01-01

## 2024-01-01 RX ADMIN — CEPHALEXIN 500 MG: 500 CAPSULE ORAL at 19:14

## 2024-01-01 ASSESSMENT — ACTIVITIES OF DAILY LIVING (ADL): ADLS_ACUITY_SCORE: 35

## 2024-01-01 NOTE — ED PROVIDER NOTES
History     Chief Complaint:  Laceration       HPI   Maryse Goins is a 63 year old right hand dominant female who came in for further evaluation of a laceration to her right index finger.  She was washing a wine glass when it broke in her hand causing a laceration to the dorsal aspect of the proximal phalanx of the right pointer finger.  She has a little bit of numbness to the distal aspect of the pointer finger, but she believes that is secondary to putting pressure on the wound.  She was concerned that this went down to bone.  She denies any weakness to the finger and can flex and extend it normally.  Tetanus is up-to-date.      Independent Historian:   None - Patient Only    Review of External Notes:   I reviewed the [facility]      Medications:    cholecalciferol (VITAMIN D3) 1250 mcg (91297 units) capsule  hydrOXYzine (ATARAX) 25 MG tablet  lisinopril (ZESTRIL) 20 MG tablet        Past Medical History:    Past Medical History:   Diagnosis Date    Cancer (H) 2007    Hypertension     Osteoarthritis of right hip, unspecified osteoarthritis type        Past Surgical History:    Past Surgical History:   Procedure Laterality Date    CHOLECYSTECTOMY      COLONOSCOPY N/A 6/9/2017    Procedure: COLONOSCOPY;  colonoscopy / gastroscopy;  Surgeon: Trev Burks MD;  Location:  GI    complete hysterectomy      pap no longer needed    COSMETIC SURGERY  2009    breast reconstruction    ESOPHAGOSCOPY, GASTROSCOPY, DUODENOSCOPY (EGD), COMBINED N/A 6/9/2017    Procedure: COMBINED ESOPHAGOSCOPY, GASTROSCOPY, DUODENOSCOPY (EGD), BIOPSY SINGLE OR MULTIPLE;;  Surgeon: Trev Burks MD;  Location:  GI    HEMORRHOIDECTOMY EXTERNAL N/A 11/9/2017    Procedure: HEMORRHOIDECTOMY EXTERNAL;  HEMORRHOIDECTOMY EXTERNAL;  Surgeon: Madina Augustin MD;  Location:  OR    HYSTERECTOMY, PAP NO LONGER INDICATED      vaginal    MASTECTOMY MODIFIED RADICAL BILATERAL      MASTECTOMY, BILATERAL          Physical Exam    Patient Vitals for the past 24 hrs:   BP Temp Pulse Resp SpO2   01/01/24 1713 (!) 160/89 97  F (36.1  C) 119 18 98 %        Physical Exam  Nursing note and vitals reviewed.  Constitutional:  Oriented to person, place, and time. Cooperative.   HENT:   Nose:    Nose normal.   Mouth/Throat:   Mucous membranes are normal.   Eyes:    Conjunctivae normal and EOM are normal.      Pupils are equal, round, and reactive to light.   Neck:    Trachea normal.   Cardiovascular:  Normal rate, regular rhythm, normal heart sounds and normal pulses. No murmur heard.  Pulmonary/Chest:  Effort normal and breath sounds normal.   Abdominal:   Soft. Normal appearance and bowel sounds are normal.      There is no tenderness.      There is no rebound and no CVA tenderness.   Musculoskeletal:  Laceration to the dorsal aspect of the proximal phalanx of the right pointer finger, but normal strength with extension of the right pointer finger at all joints.  This laceration does go down to the extensor tendon and appears to have  some of the strains of the tendon but not lacerated thumb and the tendon is intact.  Extremities otherwise atraumatic x 4.   Lymphadenopathy:  No cervical adenopathy.   Neurological:   Alert and oriented to person, place, and time. Normal strength.      No cranial nerve deficit or sensory deficit. GCS eye subscore is 4. GCS verbal subscore is 5. GCS motor subscore is 6.   Skin:    Laceration to the dorsal aspect of the proximal phalanx of the right pointer finger which appears to go down to the extensor tendon, which does not appear to be lacerated. No rash noted.   Psychiatric:   Normal mood and affect.      Emergency Department Course   Imaging:  XR Finger Right 2 Views   Final Result   IMPRESSION: Normal joint spaces and alignment. No acute fracture or radiopaque foreign body. Mild soft tissue edema about the second digit.                Laboratory:  Labs Ordered and Resulted from Time of ED Arrival to  Time of ED Departure - No data to display     [facility]    -Laceration Repair    Date/Time: 1/1/2024 6:41 PM    Performed by: Faustino Vang MD  Authorized by: Faustino Vang MD    Risks, benefits and alternatives discussed.      ANESTHESIA (see MAR for exact dosages):     Anesthesia method:  Local infiltration    Local anesthetic:  Bupivacaine 0.5% w/o epi  LACERATION DETAILS     Location:  Finger    Finger location:  R index finger    REPAIR TYPE:     Repair type:  Simple    EXPLORATION:     Hemostasis achieved with:  Tourniquet    Wound exploration: wound explored through full range of motion and entire depth of wound probed and visualized      Wound extent: tendon damage      Wound extent comment:  The extensor tendon appears slightly damaged but fully intact, as the laceration appeared to go in the same direction as the tendon fibers    Contaminated: no      TREATMENT:     Area cleansed with:  Hany-Lionel    Amount of cleaning:  Standard    Irrigation solution:  Sterile saline    Irrigation method:  Syringe    Visualized foreign bodies/material removed: no      SKIN REPAIR     Repair method:  Sutures    Suture size:  4-0    Suture material:  Nylon    Suture technique:  Simple interrupted    APPROXIMATION     Approximation:  Close    POST-PROCEDURE DETAILS     Dressing:  Antibiotic ointment and non-adherent dressing      PROCEDURE    Patient Tolerance:  Patient tolerated the procedure well with no immediate complications       Emergency Department Course & Assessments:             Interventions:  Medications   cephALEXin (KEFLEX) capsule 500 mg (500 mg Oral $Given 1/1/24 1914)          Independent Interpretation (X-rays, CTs, rhythm strip):  I reviewed the patient's finger x-rays and agree with the negative reading for foreign body.    Consultations/Discussion of Management or Tests:  None        Social Determinants of Health affecting care:   None    Disposition:  The patient  was discharged to home.     Impression & Plan    Medical Decision Making:  This is a 63-year-old female who came in for further evaluation of a finger laceration.  This went down to the extensor tendon but does not appear to involve the extensor tendon.  She had x-rays obtained to rule out any retained foreign body and specifically glass, and those were negative.  I subsequently repaired the laceration per the above procedure note.  She was then placed in a finger splint to prevent flexion of the finger.  I will have her follow-up with a hand surgeon at [facility], and I am going to provide her Keflex for prophylaxis.  Tetanus is up-to-date.  Her sutures need to be removed in about 10 days.  She was provided wound care instructions as well.      Diagnosis:    ICD-10-CM    1. Laceration of right index finger without foreign body without damage to nail, initial encounter  S61.210A            Discharge Medications:  New Prescriptions    CEPHALEXIN (KEFLEX) 500 MG CAPSULE    Take 1 capsule (500 mg) by mouth 3 times daily for 7 days            1/1/2024   Faustino Vang MD Lashkowitz, Seth H, MD  01/01/24 2113

## 2024-01-01 NOTE — ED TRIAGE NOTES
Pt cut finger while cleaning wine glass     Triage Assessment (Adult)       Row Name 01/01/24 5651          Triage Assessment    Airway WDL WDL        Respiratory WDL    Respiratory WDL WDL        Skin Circulation/Temperature WDL    Skin Circulation/Temperature WDL --  lac to right index finger        Cardiac WDL    Cardiac WDL WDL        Peripheral/Neurovascular WDL    Peripheral Neurovascular WDL WDL        Cognitive/Neuro/Behavioral WDL    Cognitive/Neuro/Behavioral WDL WDL

## 2024-01-04 ENCOUNTER — TRANSFERRED RECORDS (OUTPATIENT)
Dept: HEALTH INFORMATION MANAGEMENT | Facility: CLINIC | Age: 64
End: 2024-01-04
Payer: COMMERCIAL

## 2024-01-08 ENCOUNTER — LAB (OUTPATIENT)
Dept: LAB | Facility: CLINIC | Age: 64
End: 2024-01-08
Payer: COMMERCIAL

## 2024-01-08 DIAGNOSIS — I10 BENIGN ESSENTIAL HYPERTENSION: ICD-10-CM

## 2024-01-08 DIAGNOSIS — E55.9 VITAMIN D DEFICIENCY: ICD-10-CM

## 2024-01-08 DIAGNOSIS — Z13.6 CARDIOVASCULAR SCREENING; LDL GOAL LESS THAN 130: ICD-10-CM

## 2024-01-08 LAB
ANION GAP SERPL CALCULATED.3IONS-SCNC: 11 MMOL/L (ref 7–15)
BUN SERPL-MCNC: 11.4 MG/DL (ref 8–23)
CALCIUM SERPL-MCNC: 9.6 MG/DL (ref 8.8–10.2)
CHLORIDE SERPL-SCNC: 100 MMOL/L (ref 98–107)
CHOLEST SERPL-MCNC: 310 MG/DL
CREAT SERPL-MCNC: 0.67 MG/DL (ref 0.51–0.95)
CREAT UR-MCNC: 165 MG/DL
DEPRECATED HCO3 PLAS-SCNC: 26 MMOL/L (ref 22–29)
EGFRCR SERPLBLD CKD-EPI 2021: >90 ML/MIN/1.73M2
FASTING STATUS PATIENT QL REPORTED: YES
GLUCOSE SERPL-MCNC: 95 MG/DL (ref 70–99)
HDLC SERPL-MCNC: 64 MG/DL
LDLC SERPL CALC-MCNC: 206 MG/DL
MICROALBUMIN UR-MCNC: <12 MG/L
MICROALBUMIN/CREAT UR: NORMAL MG/G{CREAT}
NONHDLC SERPL-MCNC: 246 MG/DL
POTASSIUM SERPL-SCNC: 4.1 MMOL/L (ref 3.4–5.3)
SODIUM SERPL-SCNC: 137 MMOL/L (ref 135–145)
TRIGL SERPL-MCNC: 198 MG/DL
VIT D+METAB SERPL-MCNC: 208 NG/ML (ref 20–50)

## 2024-01-08 PROCEDURE — 36415 COLL VENOUS BLD VENIPUNCTURE: CPT

## 2024-01-08 PROCEDURE — 82570 ASSAY OF URINE CREATININE: CPT

## 2024-01-08 PROCEDURE — 80061 LIPID PANEL: CPT

## 2024-01-08 PROCEDURE — 80048 BASIC METABOLIC PNL TOTAL CA: CPT

## 2024-01-08 PROCEDURE — 82306 VITAMIN D 25 HYDROXY: CPT

## 2024-01-08 PROCEDURE — 82043 UR ALBUMIN QUANTITATIVE: CPT

## 2024-01-10 ENCOUNTER — OFFICE VISIT (OUTPATIENT)
Dept: FAMILY MEDICINE | Facility: CLINIC | Age: 64
End: 2024-01-10
Payer: COMMERCIAL

## 2024-01-10 VITALS
SYSTOLIC BLOOD PRESSURE: 130 MMHG | OXYGEN SATURATION: 100 % | BODY MASS INDEX: 23.35 KG/M2 | HEIGHT: 61 IN | RESPIRATION RATE: 16 BRPM | HEART RATE: 76 BPM | WEIGHT: 123.7 LBS | DIASTOLIC BLOOD PRESSURE: 80 MMHG | TEMPERATURE: 97.2 F

## 2024-01-10 DIAGNOSIS — T45.2X1A VITAMIN D OVERDOSE, ACCIDENTAL OR UNINTENTIONAL, INITIAL ENCOUNTER: ICD-10-CM

## 2024-01-10 DIAGNOSIS — I10 BENIGN ESSENTIAL HYPERTENSION: Primary | ICD-10-CM

## 2024-01-10 DIAGNOSIS — S61.211D LACERATION OF LEFT INDEX FINGER WITHOUT FOREIGN BODY WITHOUT DAMAGE TO NAIL, SUBSEQUENT ENCOUNTER: ICD-10-CM

## 2024-01-10 DIAGNOSIS — Z48.02 ENCOUNTER FOR REMOVAL OF SUTURES: ICD-10-CM

## 2024-01-10 DIAGNOSIS — E78.5 HYPERLIPIDEMIA LDL GOAL <130: ICD-10-CM

## 2024-01-10 LAB
CHOLEST SERPL-MCNC: 308 MG/DL
FASTING STATUS PATIENT QL REPORTED: YES
HDLC SERPL-MCNC: 69 MG/DL
LDLC SERPL CALC-MCNC: 194 MG/DL
NONHDLC SERPL-MCNC: 239 MG/DL
TRIGL SERPL-MCNC: 225 MG/DL

## 2024-01-10 PROCEDURE — 99214 OFFICE O/P EST MOD 30 MIN: CPT | Performed by: FAMILY MEDICINE

## 2024-01-10 PROCEDURE — 80061 LIPID PANEL: CPT | Performed by: FAMILY MEDICINE

## 2024-01-10 PROCEDURE — 36415 COLL VENOUS BLD VENIPUNCTURE: CPT | Performed by: FAMILY MEDICINE

## 2024-01-10 RX ORDER — LISINOPRIL 20 MG/1
20 TABLET ORAL DAILY
Qty: 90 TABLET | Refills: 1 | Status: SHIPPED | OUTPATIENT
Start: 2024-01-10 | End: 2024-07-10

## 2024-01-10 ASSESSMENT — ANXIETY QUESTIONNAIRES
5. BEING SO RESTLESS THAT IT IS HARD TO SIT STILL: NOT AT ALL
2. NOT BEING ABLE TO STOP OR CONTROL WORRYING: NOT AT ALL
7. FEELING AFRAID AS IF SOMETHING AWFUL MIGHT HAPPEN: NOT AT ALL
6. BECOMING EASILY ANNOYED OR IRRITABLE: NOT AT ALL
3. WORRYING TOO MUCH ABOUT DIFFERENT THINGS: NOT AT ALL
4. TROUBLE RELAXING: NOT AT ALL
IF YOU CHECKED OFF ANY PROBLEMS ON THIS QUESTIONNAIRE, HOW DIFFICULT HAVE THESE PROBLEMS MADE IT FOR YOU TO DO YOUR WORK, TAKE CARE OF THINGS AT HOME, OR GET ALONG WITH OTHER PEOPLE: NOT DIFFICULT AT ALL
GAD7 TOTAL SCORE: 0
GAD7 TOTAL SCORE: 0
8. IF YOU CHECKED OFF ANY PROBLEMS, HOW DIFFICULT HAVE THESE MADE IT FOR YOU TO DO YOUR WORK, TAKE CARE OF THINGS AT HOME, OR GET ALONG WITH OTHER PEOPLE?: NOT DIFFICULT AT ALL
GAD7 TOTAL SCORE: 0
7. FEELING AFRAID AS IF SOMETHING AWFUL MIGHT HAPPEN: NOT AT ALL
1. FEELING NERVOUS, ANXIOUS, OR ON EDGE: NOT AT ALL

## 2024-01-10 ASSESSMENT — PATIENT HEALTH QUESTIONNAIRE - PHQ9
SUM OF ALL RESPONSES TO PHQ QUESTIONS 1-9: 0
10. IF YOU CHECKED OFF ANY PROBLEMS, HOW DIFFICULT HAVE THESE PROBLEMS MADE IT FOR YOU TO DO YOUR WORK, TAKE CARE OF THINGS AT HOME, OR GET ALONG WITH OTHER PEOPLE: NOT DIFFICULT AT ALL
SUM OF ALL RESPONSES TO PHQ QUESTIONS 1-9: 0

## 2024-01-10 ASSESSMENT — PAIN SCALES - GENERAL: PAINLEVEL: NO PAIN (0)

## 2024-01-10 NOTE — PROGRESS NOTES
Assessment & Plan       ICD-10-CM    1. Benign essential hypertension  I10 lisinopril (ZESTRIL) 20 MG tablet      2. Hyperlipidemia LDL goal <130  E78.5 Lipid panel reflex to direct LDL Fasting     Lipid panel reflex to direct LDL Fasting      3. Vitamin D overdose, accidental or unintentional, initial encounter  T45.2X1A       4. Laceration of left index finger without foreign body without damage to nail, subsequent encounter  S61.211D       5. Encounter for removal of sutures  Z48.02          HTN- Blood pressure in good control on current medication/s - lisinopril 20mg/d.  No side effects. Labs UTD.  Will continue current meds, refills sent.  Continue every six month follow-up.      Lipids- LDL very surprisingly quite elevated at 206 on 1/8/24.  Up from 124 in 3/21 and 106 in 8/18.  Minimal change in diet, exercise has been increasing again (3-4 classes/wk, trying to get back to the 4-5d/wk she was doing pre-covid, but better than 2021).  Odd. She is fasting today, will recheck and see if mistake or not.  If still elevated, will plan video visit to discuss potential txt options/plan.    Vit D - Vit D very high at 206, but this makes more sense.  She's been on very high dose Vit D since ~2010, 50,000 units twice weekly through oncology, under dx of 'low Vit D'.  Recommended going off for a few weeks, then can restart Vit D at 25 mcg daily.    Laceration of left 2nd finger on 1/1/24, ~5 suture placed, healing well.  To be removed at ~10 days.    Suture removed by RN staff today, no complications.      I spent a total of 35 minutes on the day of the visit.   Time spent by me doing chart review, history and exam, documentation and further activities per the note      Emily El MD  [facility]            Herminio Serra is a 63 year old, presenting for the following health issues:  Hypertension      History of Present Illness       Hyperlipidemia:  She presents for follow up of  hyperlipidemia.   She is not taking medication to lower cholesterol. She is not having myalgia or other side effects to statin medications.    Hypertension: She presents for follow up of hypertension.  She does check blood pressure  regularly outside of the clinic. Outpatient blood pressures have not been over 140/90. She does not follow a low salt diet.     She eats 2-3 servings of fruits and vegetables daily.She consumes 0 sweetened beverage(s) daily.She exercises with enough effort to increase her heart rate 30 to 60 minutes per day.  She exercises with enough effort to increase her heart rate 3 or less days per week.   She is taking medications regularly.     Lipids- LDL odd.    Pre-COVID- was in such great shape, but she's working her way back up.  Used to do talisah dance- going back up to the levels she was- 2-3 d/wk.  Step class 1d/wk. Now at 3-4d/wk.  Pre-covid- 4-5 d/wk, hot yoga, talisha.    Diet- no big changes.  Not much red meat.   Could get more fruits/vegetables and more variety.  Wine- ~8 drinks/wk.    Breakfast- yogurt (skyr, flavored) sometimes with PB toast and sourdough bread  Prior- would make a smoothies w/ fruit/dates/sometimes spinach.  Lunch- tuna sandwich, salads from co-op (kales/beets/pasta)  Dinner- in winter- soups (often veggie), occasionally chili, salmon at times  Snacks- not much sweets (ticked up just a bit this year), not a snacker  Eating out- 4-6x/month (taco bell/chipotle/subway)    BPs-   Home readings have been good--  111-130/72-79  Lisinopril 20mg/d.        Vit d- 208 on 1/8/24.  Starting this spring, having more latte's- cup and half of skin milk.  Vit D supplementation 50,000 twice weekly since 2010.  Has been on this high dose since 2010, though levels okay in 2017.  Appetite off, muscle aches a few weeks ago, urinating more.          Review of Systems   Constitutional, HEENT, cardiovascular, pulmonary, gi and gu systems are negative, except as otherwise noted.     "  Objective    /80   Pulse 76   Temp 97.2  F (36.2  C) (Temporal)   Resp 16   Ht 1.553 m (5' 1.14\")   Wt 56.1 kg (123 lb 11.2 oz)   SpO2 100%   BMI 23.27 kg/m    Body mass index is 23.27 kg/m .  Physical Exam   GENERAL: healthy, alert and no distress  NECK: no adenopathy, no asymmetry, masses, or scars and thyroid normal to palpation  RESP: lungs clear to auscultation - no rales, rhonchi or wheezes  CV: regular rate and rhythm, normal S1 S2, no S3 or S4, no murmur, click or rub, no peripheral edema and peripheral pulses strong  MS: no gross musculoskeletal defects noted, no edema      Lab on 01/08/2024   Component Date Value Ref Range Status    Sodium 01/08/2024 137  135 - 145 mmol/L Final    Reference intervals for this test were updated on 09/26/2023 to more accurately reflect our healthy population. There may be differences in the flagging of prior results with similar values performed with this method. Interpretation of those prior results can be made in the context of the updated reference intervals.     Potassium 01/08/2024 4.1  3.4 - 5.3 mmol/L Final    Chloride 01/08/2024 100  98 - 107 mmol/L Final    Carbon Dioxide (CO2) 01/08/2024 26  22 - 29 mmol/L Final    Anion Gap 01/08/2024 11  7 - 15 mmol/L Final    Urea Nitrogen 01/08/2024 11.4  8.0 - 23.0 mg/dL Final    Creatinine 01/08/2024 0.67  0.51 - 0.95 mg/dL Final    GFR Estimate 01/08/2024 >90  >60 mL/min/1.73m2 Final    Calcium 01/08/2024 9.6  8.8 - 10.2 mg/dL Final    Glucose 01/08/2024 95  70 - 99 mg/dL Final    Creatinine Urine mg/dL 01/08/2024 165.0  mg/dL Final    The reference ranges have not been established in urine creatinine. The results should be integrated into the clinical context for interpretation.    Albumin Urine mg/L 01/08/2024 <12.0  mg/L Final    The reference ranges have not been established in urine albumin. The results should be integrated into the clinical context for interpretation.    Albumin Urine mg/g Cr 01/08/2024  "   Final    Unable to calculate, urine albumin and/or urine creatinine is outside detectable limits.  Microalbuminuria is defined as an albumin:creatinine ratio of 17 to 299 for males and 25 to 299 for females. A ratio of albumin:creatinine of 300 or higher is indicative of overt proteinuria.  Due to biologic variability, positive results should be confirmed by a second, first-morning random or 24-hour timed urine specimen. If there is discrepancy, a third specimen is recommended. When 2 out of 3 results are in the microalbuminuria range, this is evidence for incipient nephropathy and warrants increased efforts at glucose control, blood pressure control, and institution of therapy with an angiotensin-converting-enzyme (ACE) inhibitor (if the patient can tolerate it).      Cholesterol 01/08/2024 310 (H)  <200 mg/dL Final    Triglycerides 01/08/2024 198 (H)  <150 mg/dL Final    Direct Measure HDL 01/08/2024 64  >=50 mg/dL Final    LDL Cholesterol Calculated 01/08/2024 206 (H)  <=100 mg/dL Final    Non HDL Cholesterol 01/08/2024 246 (H)  <130 mg/dL Final    Patient Fasting > 8hrs? 01/08/2024 Yes   Final    Vitamin D, Total (25-Hydroxy) 01/08/2024 208 (H)  20 - 50 ng/mL Final    toxicity possible

## 2024-01-10 NOTE — NURSING NOTE
Maryse Goins presents to the clinic today for removal of sutures.  The patient has had the sutures in place for 9 days.  There has been no history of infection or drainage.  5 sutures are seen located on the R index finger.  The wound is healing well with no signs of infection.  Tetanus status is up to date.   All sutures were easily removed today.  Routine wound care discussed.  The patient will follow up as needed.     Danya Carbajal RN

## 2024-01-23 NOTE — RESULT ENCOUNTER NOTE
-Your cholesterol panel still looks abnormal with a very high LDL (the bad cholesterol), though your HDL (the good cholesterol) is still in a good range.  Your triglycerides are also still elevated (though these are similar to in the past).    I would make a video visit appointment as we discussed so we can discuss these in further depth, including the pros/cons of starting lipid lowering medication.    Best,   Richard El MD

## 2024-01-24 ENCOUNTER — VIRTUAL VISIT (OUTPATIENT)
Dept: FAMILY MEDICINE | Facility: CLINIC | Age: 64
End: 2024-01-24
Payer: COMMERCIAL

## 2024-01-24 ENCOUNTER — TELEPHONE (OUTPATIENT)
Dept: GASTROENTEROLOGY | Facility: CLINIC | Age: 64
End: 2024-01-24

## 2024-01-24 DIAGNOSIS — E78.5 HYPERLIPIDEMIA LDL GOAL <130: Primary | ICD-10-CM

## 2024-01-24 DIAGNOSIS — I10 BENIGN ESSENTIAL HYPERTENSION: ICD-10-CM

## 2024-01-24 PROCEDURE — 99214 OFFICE O/P EST MOD 30 MIN: CPT | Mod: 95 | Performed by: FAMILY MEDICINE

## 2024-01-24 NOTE — TELEPHONE ENCOUNTER
Pre assessment completed for upcoming procedure.    Procedure details:    Arrival time and facility location reviewed.    Pre op exam needed? N/A    Designated  policy reviewed. Instructed to have someone stay 24 hours post procedure.     COVID policy reviewed.      Medication review:    Medications reviewed. Please see supporting documentation below. Holding recommendations discussed (if applicable).       Prep for procedure:     Reviewed procedure prep instructions.     Patient verbalized understanding and had no questions or concerns at this time.        Corinne Kliber, RN  Endoscopy Procedure Pre Assessment RN  [phone] option 4

## 2024-01-24 NOTE — TELEPHONE ENCOUNTER
Attempted to contact patient in order to complete pre assessment questions.     Pt is unable to talk at this time and will call back.     Aida Campos RN

## 2024-01-24 NOTE — TELEPHONE ENCOUNTER
Pre visit planning completed.      Procedure details:    Patient scheduled for Colonoscopy  on 2.7.2024.     Arrival time: 0900. Procedure time 1000    Pre op exam needed? N/A    Facility location: [facility]; [address] [place] [address] [address], [place] [address]    Sedation type: MAC    Indication for procedure: family hx of colon CA, hx of polyps, familial polyposis of colon      Chart review:     Electronic implanted devices? No    Recent diagnosis of diverticulitis within the last 6 weeks? No    Diabetic? No    Diabetic medication HOLDING recommendations: (if applicable)  Oral diabetic medications: N/A  Diabetic injectables: N/A  Insulin: N/A      Medication review:    Anticoagulants? No    NSAIDS? No NSAID medications per patient's medication list.  RN will verify with pre-assessment call.    Other medication HOLDING recommendations:  N/A      Prep for procedure:     Bowel prep recommendation: Standard Miralax   Due to:  standard bowel prep.    Prep instructions sent via [employer]       Aida Campos RN  Endoscopy Procedure Pre Assessment RN  [phone] option 4
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
## 2024-02-07 ENCOUNTER — TRANSFERRED RECORDS (OUTPATIENT)
Dept: HEALTH INFORMATION MANAGEMENT | Facility: CLINIC | Age: 64
End: 2024-02-07
Payer: COMMERCIAL

## 2024-02-07 PROCEDURE — 88305 TISSUE EXAM BY PATHOLOGIST: CPT | Mod: 26 | Performed by: PATHOLOGY

## 2024-02-07 PROCEDURE — 88305 TISSUE EXAM BY PATHOLOGIST: CPT | Mod: TC,ORL | Performed by: COLON & RECTAL SURGERY

## 2024-02-08 ENCOUNTER — LAB REQUISITION (OUTPATIENT)
Dept: LAB | Facility: CLINIC | Age: 64
End: 2024-02-08
Payer: COMMERCIAL

## 2024-02-08 DIAGNOSIS — Z13.9 ENCOUNTER FOR SCREENING, UNSPECIFIED: ICD-10-CM

## 2024-02-12 LAB
PATH REPORT.COMMENTS IMP SPEC: NORMAL
PATH REPORT.FINAL DX SPEC: NORMAL
PATH REPORT.GROSS SPEC: NORMAL
PATH REPORT.MICROSCOPIC SPEC OTHER STN: NORMAL
PATH REPORT.RELEVANT HX SPEC: NORMAL
PHOTO IMAGE: NORMAL

## 2024-03-05 NOTE — PROGRESS NOTES
[facility]    Hematology/Oncology Established Patient Follow-up Note      Today's Date: 3/11/2024    Reason for Follow-up: Left breast cancer.     HISTORY OF PRESENT ILLNESS: Maryse Goins is a 63 year old female who presents with the following oncologic history:  --3/2006: Diagnosed with left breast infiltrating ductal carcinoma (small primary tumor), grade 3, ER/NM positive, HER-2 negative.  Underwent modified left radical mastectomy, had 8/14 positive nodes.  --8/2006: Completed adjuvant chemotherapy with dose-dense Adriamycin and Cytoxan followed by paclitaxel under care of Dr. Vannessa Mejia.  --9/2006: Started anastrozole.  --10/2006: Completed adjuvant radiation.  --4/2007: Hysterectomy and BSO.  --12/2009: Prophylactic right mastectomy with immediate reconstruction. Left TRAM flap; right implant. Right breast pathology benign. Tested negative for BRCA mutations.  --12/2013: Switched to tamoxifen. Planned discontinuation as of 3/7/2023.      INTERIM HISTORY:  Maryse reports feeling well.  No new pain. She has now transitioned to plant-based diet. She goes to [employer] twice per week and added step class and mat pilates.    REVIEW OF SYSTEMS:   14 point ROS was reviewed and is negative other than as noted above in HPI.       HOME MEDICATIONS:  Current Outpatient Medications   Medication Sig Dispense Refill    lisinopril (ZESTRIL) 20 MG tablet Take 1 tablet (20 mg) by mouth daily 90 tablet 1         ALLERGIES:  Allergies   Allergen Reactions    Sulfa Antibiotics      Itching, swelling, irritation    Iodinated Contrast Media Itching and Rash    Metrizamide Itching and Rash         PAST MEDICAL HISTORY:  Past Medical History:   Diagnosis Date    Cancer (H) 2007    Left Breast Cancer    Hypertension     Osteoarthritis of right hip, unspecified osteoarthritis type          PAST SURGICAL HISTORY:  Past Surgical History:   Procedure Laterality Date    CHOLECYSTECTOMY      COLONOSCOPY N/A 6/9/2017     Procedure: COLONOSCOPY;  colonoscopy / gastroscopy;  Surgeon: Trev Burks MD;  Location:  GI    complete hysterectomy      pap no longer needed    COSMETIC SURGERY  2009    breast reconstruction    ESOPHAGOSCOPY, GASTROSCOPY, DUODENOSCOPY (EGD), COMBINED N/A 2017    Procedure: COMBINED ESOPHAGOSCOPY, GASTROSCOPY, DUODENOSCOPY (EGD), BIOPSY SINGLE OR MULTIPLE;;  Surgeon: Trev Burks MD;  Location:  GI    HEMORRHOIDECTOMY EXTERNAL N/A 2017    Procedure: HEMORRHOIDECTOMY EXTERNAL;  HEMORRHOIDECTOMY EXTERNAL;  Surgeon: Madina Augustin MD;  Location:  OR    HYSTERECTOMY, PAP NO LONGER INDICATED      vaginal    MASTECTOMY MODIFIED RADICAL BILATERAL      MASTECTOMY, BILATERAL           SOCIAL HISTORY:  Social History     Socioeconomic History    Marital status:      Spouse name: Not on file    Number of children: Not on file    Years of education: Not on file    Highest education level: Not on file   Occupational History    Not on file   Tobacco Use    Smoking status: Former     Packs/day: 0.00     Years: 0.00     Additional pack years: 0.00     Total pack years: 0.00     Types: Cigarettes     Quit date: 1993     Years since quittin.3    Smokeless tobacco: Never    Tobacco comments:     15 yrs ago   Vaping Use    Vaping Use: Never used   Substance and Sexual Activity    Alcohol use: Yes     Alcohol/week: 0.0 standard drinks of alcohol     Comment: 4-6  drinks weekly    Drug use: No    Sexual activity: Not Currently     Partners: Male   Other Topics Concern    Parent/sibling w/ CABG, MI or angioplasty before 65F 55M? No   Social History Narrative    Not on file     Social Determinants of Health     Financial Resource Strain: Low Risk  (1/10/2024)    Financial Resource Strain     Within the past 12 months, have you or your family members you live with been unable to get utilities (heat, electricity) when it was really needed?: No   Food Insecurity: Low Risk   (1/10/2024)    Food Insecurity     Within the past 12 months, did you worry that your food would run out before you got money to buy more?: No     Within the past 12 months, did the food you bought just not last and you didn t have money to get more?: No   Transportation Needs: Low Risk  (1/10/2024)    Transportation Needs     Within the past 12 months, has lack of transportation kept you from medical appointments, getting your medicines, non-medical meetings or appointments, work, or from getting things that you need?: No   Physical Activity: Not on file   Stress: Not on file   Social Connections: Not on file   Interpersonal Safety: Low Risk  (1/10/2024)    Interpersonal Safety     Do you feel physically and emotionally safe where you currently live?: Yes     Within the past 12 months, have you been hit, slapped, kicked or otherwise physically hurt by someone?: No     Within the past 12 months, have you been humiliated or emotionally abused in other ways by your partner or ex-partner?: No   Housing Stability: Low Risk  (1/10/2024)    Housing Stability     Do you have housing? : Yes     Are you worried about losing your housing?: No   She has twin sons and a daughter.      FAMILY HISTORY:  Family History   Problem Relation Age of Onset    Colon Cancer Mother 49         at 52    Parkinsonism Father 62        huge lead exposure    Hypertension Sister         lipids fine    Thyroid Disease Sister     Hypertension Brother     Diabetes Maternal Grandmother     Heart Disease Maternal Grandmother         unsure type    Colon Cancer Maternal Aunt 44            Colon Cancer Maternal Aunt     Colon Cancer Maternal Uncle             Other Cancer Maternal Uncle         Stomach cancer    Coronary Artery Disease Paternal Cousin 45        MI on golf cart, terrible diet         PHYSICAL EXAM:  Vital signs:  BP (!) 142/87   Pulse 81   Resp 16   Wt 57.4 kg (126 lb 9.6 oz)   SpO2 99%   BMI 23.81 kg/m      GENERAL/CONSTITUTIONAL: No acute distress.  EYES: No scleral icterus.  LYMPH: No cervical, supraclavicular, axillary adenopathy.   BREAST: Bilateral breasts surgically absent with left TRAM flap reconstructive changes and right implant with no periprosthetic fluid; no chest wall masses bilaterally; no new skin erythema or ulceration.  RESPIRATORY: No audible cough or wheezing.   GASTROINTESTINAL: No hepatosplenomegaly, masses, or tenderness. No guarding.  No distention.  MUSCULOSKELETAL: Warm and well-perfused, no cyanosis, clubbing, or edema.  NEUROLOGIC: Alert, oriented, answers questions appropriately.  INTEGUMENTARY: No rashes or jaundice.  GAIT: Steady, does not use assistive device.      LABS:  CBC RESULTS:   Recent Labs   Lab Test 04/23/21  1118 02/20/19  0939   WBC  --  7.8   RBC  --  4.31   HGB 13.1 13.3   HCT  --  39.9   MCV  --  93   MCH  --  30.9   MCHC  --  33.3   RDW  --  13.8   PLT  --  207     Last Comprehensive Metabolic Panel:  Sodium   Date Value Ref Range Status   01/08/2024 137 135 - 145 mmol/L Final     Comment:     Reference intervals for this test were updated on 09/26/2023 to more accurately reflect our healthy population. There may be differences in the flagging of prior results with similar values performed with this method. Interpretation of those prior results can be made in the context of the updated reference intervals.    04/23/2021 140 133 - 144 mmol/L Final     Potassium   Date Value Ref Range Status   01/08/2024 4.1 3.4 - 5.3 mmol/L Final   07/14/2022 4.8 3.4 - 5.3 mmol/L Final   04/23/2021 4.2 3.4 - 5.3 mmol/L Final     Chloride   Date Value Ref Range Status   01/08/2024 100 98 - 107 mmol/L Final   07/14/2022 107 94 - 109 mmol/L Final   04/23/2021 104 94 - 109 mmol/L Final     Carbon Dioxide   Date Value Ref Range Status   04/23/2021 28 20 - 32 mmol/L Final     Carbon Dioxide (CO2)   Date Value Ref Range Status   01/08/2024 26 22 - 29 mmol/L Final   07/14/2022 29 20 - 32 mmol/L  Final     Anion Gap   Date Value Ref Range Status   01/08/2024 11 7 - 15 mmol/L Final   07/14/2022 2 (L) 3 - 14 mmol/L Final   04/23/2021 8 3 - 14 mmol/L Final     Glucose   Date Value Ref Range Status   01/08/2024 95 70 - 99 mg/dL Final   07/14/2022 96 70 - 99 mg/dL Final   04/23/2021 102 (H) 70 - 99 mg/dL Final     Comment:     Non Fasting     Urea Nitrogen   Date Value Ref Range Status   01/08/2024 11.4 8.0 - 23.0 mg/dL Final   07/14/2022 10 7 - 30 mg/dL Final   04/23/2021 12 7 - 30 mg/dL Final     Creatinine   Date Value Ref Range Status   01/08/2024 0.67 0.51 - 0.95 mg/dL Final   04/23/2021 0.65 0.52 - 1.04 mg/dL Final     GFR Estimate   Date Value Ref Range Status   01/08/2024 >90 >60 mL/min/1.73m2 Final   04/23/2021 >90 >60 mL/min/[1.73_m2] Final     Comment:     Non  GFR Calc  Starting 12/18/2018, serum creatinine based estimated GFR (eGFR) will be   calculated using the Chronic Kidney Disease Epidemiology Collaboration   (CKD-EPI) equation.       Calcium   Date Value Ref Range Status   01/08/2024 9.6 8.8 - 10.2 mg/dL Final   04/23/2021 9.5 8.5 - 10.1 mg/dL Final     Bilirubin Total   Date Value Ref Range Status   01/14/2019 0.4 0.2 - 1.3 mg/dL Final     Alkaline Phosphatase   Date Value Ref Range Status   01/14/2019 57 40 - 150 U/L Final     ALT   Date Value Ref Range Status   01/14/2019 30 0 - 50 U/L Final     AST   Date Value Ref Range Status   01/14/2019 29 0 - 45 U/L Final       ASSESSMENT/PLAN:  Maryse Goins is a 63 year old female with the following issues:  1. Stage III, grade 3 invasive ductal carcinoma, ER/AZ positive, HER-2 negative  --Maryse is s/p left mastectomy and left axillary lymph node dissection with 8/14 positive lymph nodes, adjuvant chemo with ddAC-T completed 8/2006, adjuvant radiation 10/2006, followed by anastrozole 9/2006, then switched to tamoxifen 12/2013 through 3/7/2023. She is s/p prophylactic right mastectomy.  --She has no clinical evidence for  recurrent breast cancer by physical exam from today.  --I advised continuing her active, healthy lifestyle.      Return in 1 year.    Opal Choi MD  Hematology/Oncology  [facility]    Total time spent today: 20 minutes in chart review, patient evaluation, counseling, documentation, test and/or medication/prescription orders, and coordination of care.

## 2024-03-11 ENCOUNTER — ONCOLOGY VISIT (OUTPATIENT)
Dept: ONCOLOGY | Facility: CLINIC | Age: 64
End: 2024-03-11
Attending: INTERNAL MEDICINE
Payer: COMMERCIAL

## 2024-03-11 VITALS
OXYGEN SATURATION: 99 % | RESPIRATION RATE: 16 BRPM | HEART RATE: 81 BPM | DIASTOLIC BLOOD PRESSURE: 87 MMHG | WEIGHT: 126.6 LBS | SYSTOLIC BLOOD PRESSURE: 142 MMHG | BODY MASS INDEX: 23.81 KG/M2

## 2024-03-11 DIAGNOSIS — C50.412 MALIGNANT NEOPLASM OF UPPER-OUTER QUADRANT OF LEFT BREAST IN FEMALE, ESTROGEN RECEPTOR POSITIVE (H): Primary | ICD-10-CM

## 2024-03-11 DIAGNOSIS — Z17.0 MALIGNANT NEOPLASM OF UPPER-OUTER QUADRANT OF LEFT BREAST IN FEMALE, ESTROGEN RECEPTOR POSITIVE (H): Primary | ICD-10-CM

## 2024-03-11 PROCEDURE — 99211 OFF/OP EST MAY X REQ PHY/QHP: CPT | Performed by: INTERNAL MEDICINE

## 2024-03-11 PROCEDURE — 99213 OFFICE O/P EST LOW 20 MIN: CPT | Performed by: INTERNAL MEDICINE

## 2024-03-11 ASSESSMENT — PAIN SCALES - GENERAL: PAINLEVEL: NO PAIN (0)

## 2024-03-11 NOTE — LETTER
3/11/2024         RE: Maryse Goins  [address]  [facility] [address]        Dear Colleague,    Thank you for referring your patient, Maryse Goins, to the [facility]. Please see a copy of my visit note below.    [facility]    Hematology/Oncology Established Patient Follow-up Note      Today's Date: 3/11/2024    Reason for Follow-up: Left breast cancer.     HISTORY OF PRESENT ILLNESS: Maryse Goins is a 63 year old female who presents with the following oncologic history:  --3/2006: Diagnosed with left breast infiltrating ductal carcinoma (small primary tumor), grade 3, ER/DC positive, HER-2 negative.  Underwent modified left radical mastectomy, had 8/14 positive nodes.  --8/2006: Completed adjuvant chemotherapy with dose-dense Adriamycin and Cytoxan followed by paclitaxel under care of Dr. Vannessa Mejia.  --9/2006: Started anastrozole.  --10/2006: Completed adjuvant radiation.  --4/2007: Hysterectomy and BSO.  --12/2009: Prophylactic right mastectomy with immediate reconstruction. Left TRAM flap; right implant. Right breast pathology benign. Tested negative for BRCA mutations.  --12/2013: Switched to tamoxifen. Planned discontinuation as of 3/7/2023.      INTERIM HISTORY:  Maryse reports feeling well.  No new pain. She has now transitioned to plant-based diet. She goes to [employer] twice per week and added step class and mat pilates.    REVIEW OF SYSTEMS:   14 point ROS was reviewed and is negative other than as noted above in HPI.       HOME MEDICATIONS:  Current Outpatient Medications   Medication Sig Dispense Refill     lisinopril (ZESTRIL) 20 MG tablet Take 1 tablet (20 mg) by mouth daily 90 tablet 1         ALLERGIES:  Allergies   Allergen Reactions     Sulfa Antibiotics      Itching, swelling, irritation     Iodinated Contrast Media Itching and Rash     Metrizamide Itching and Rash         PAST MEDICAL HISTORY:  Past Medical History:    Diagnosis Date     Cancer (H) 2007    Left Breast Cancer     Hypertension      Osteoarthritis of right hip, unspecified osteoarthritis type          PAST SURGICAL HISTORY:  Past Surgical History:   Procedure Laterality Date     CHOLECYSTECTOMY       COLONOSCOPY N/A 2017    Procedure: COLONOSCOPY;  colonoscopy / gastroscopy;  Surgeon: Trev Burks MD;  Location:  GI     complete hysterectomy      pap no longer needed     COSMETIC SURGERY  2009    breast reconstruction     ESOPHAGOSCOPY, GASTROSCOPY, DUODENOSCOPY (EGD), COMBINED N/A 2017    Procedure: COMBINED ESOPHAGOSCOPY, GASTROSCOPY, DUODENOSCOPY (EGD), BIOPSY SINGLE OR MULTIPLE;;  Surgeon: Trev Burks MD;  Location:  GI     HEMORRHOIDECTOMY EXTERNAL N/A 2017    Procedure: HEMORRHOIDECTOMY EXTERNAL;  HEMORRHOIDECTOMY EXTERNAL;  Surgeon: Madina Augustin MD;  Location:  OR     HYSTERECTOMY, PAP NO LONGER INDICATED      vaginal     MASTECTOMY MODIFIED RADICAL BILATERAL       MASTECTOMY, BILATERAL           SOCIAL HISTORY:  Social History     Socioeconomic History     Marital status:      Spouse name: Not on file     Number of children: Not on file     Years of education: Not on file     Highest education level: Not on file   Occupational History     Not on file   Tobacco Use     Smoking status: Former     Packs/day: 0.00     Years: 0.00     Additional pack years: 0.00     Total pack years: 0.00     Types: Cigarettes     Quit date: 1993     Years since quittin.3     Smokeless tobacco: Never     Tobacco comments:     15 yrs ago   Vaping Use     Vaping Use: Never used   Substance and Sexual Activity     Alcohol use: Yes     Alcohol/week: 0.0 standard drinks of alcohol     Comment: 4-6  drinks weekly     Drug use: No     Sexual activity: Not Currently     Partners: Male   Other Topics Concern     Parent/sibling w/ CABG, MI or angioplasty before 65F 55M? No   Social History Narrative     Not on file      Social Determinants of Health     Financial Resource Strain: Low Risk  (1/10/2024)    Financial Resource Strain      Within the past 12 months, have you or your family members you live with been unable to get utilities (heat, electricity) when it was really needed?: No   Food Insecurity: Low Risk  (1/10/2024)    Food Insecurity      Within the past 12 months, did you worry that your food would run out before you got money to buy more?: No      Within the past 12 months, did the food you bought just not last and you didn t have money to get more?: No   Transportation Needs: Low Risk  (1/10/2024)    Transportation Needs      Within the past 12 months, has lack of transportation kept you from medical appointments, getting your medicines, non-medical meetings or appointments, work, or from getting things that you need?: No   Physical Activity: Not on file   Stress: Not on file   Social Connections: Not on file   Interpersonal Safety: Low Risk  (1/10/2024)    Interpersonal Safety      Do you feel physically and emotionally safe where you currently live?: Yes      Within the past 12 months, have you been hit, slapped, kicked or otherwise physically hurt by someone?: No      Within the past 12 months, have you been humiliated or emotionally abused in other ways by your partner or ex-partner?: No   Housing Stability: Low Risk  (1/10/2024)    Housing Stability      Do you have housing? : Yes      Are you worried about losing your housing?: No   She has twin sons and a daughter.      FAMILY HISTORY:  Family History   Problem Relation Age of Onset     Colon Cancer Mother 49         at 52     Parkinsonism Father 62        huge lead exposure     Hypertension Sister         lipids fine     Thyroid Disease Sister      Hypertension Brother      Diabetes Maternal Grandmother      Heart Disease Maternal Grandmother         unsure type     Colon Cancer Maternal Aunt 44             Colon Cancer Maternal Aunt       Colon Cancer Maternal Uncle              Other Cancer Maternal Uncle         Stomach cancer     Coronary Artery Disease Paternal Cousin 45        MI on golf cart, terrible diet         PHYSICAL EXAM:  Vital signs:  BP (!) 142/87   Pulse 81   Resp 16   Wt 57.4 kg (126 lb 9.6 oz)   SpO2 99%   BMI 23.81 kg/m     GENERAL/CONSTITUTIONAL: No acute distress.  EYES: No scleral icterus.  LYMPH: No cervical, supraclavicular, axillary adenopathy.   BREAST: Bilateral breasts surgically absent with left TRAM flap reconstructive changes and right implant with no periprosthetic fluid; no chest wall masses bilaterally; no new skin erythema or ulceration.  RESPIRATORY: No audible cough or wheezing.   GASTROINTESTINAL: No hepatosplenomegaly, masses, or tenderness. No guarding.  No distention.  MUSCULOSKELETAL: Warm and well-perfused, no cyanosis, clubbing, or edema.  NEUROLOGIC: Alert, oriented, answers questions appropriately.  INTEGUMENTARY: No rashes or jaundice.  GAIT: Steady, does not use assistive device.      LABS:  CBC RESULTS:   Recent Labs   Lab Test 21  1118 19  0939   WBC  --  7.8   RBC  --  4.31   HGB 13.1 13.3   HCT  --  39.9   MCV  --  93   MCH  --  30.9   MCHC  --  33.3   RDW  --  13.8   PLT  --  207     Last Comprehensive Metabolic Panel:  Sodium   Date Value Ref Range Status   2024 137 135 - 145 mmol/L Final     Comment:     Reference intervals for this test were updated on 2023 to more accurately reflect our healthy population. There may be differences in the flagging of prior results with similar values performed with this method. Interpretation of those prior results can be made in the context of the updated reference intervals.    2021 140 133 - 144 mmol/L Final     Potassium   Date Value Ref Range Status   2024 4.1 3.4 - 5.3 mmol/L Final   2022 4.8 3.4 - 5.3 mmol/L Final   2021 4.2 3.4 - 5.3 mmol/L Final     Chloride   Date Value Ref Range Status    01/08/2024 100 98 - 107 mmol/L Final   07/14/2022 107 94 - 109 mmol/L Final   04/23/2021 104 94 - 109 mmol/L Final     Carbon Dioxide   Date Value Ref Range Status   04/23/2021 28 20 - 32 mmol/L Final     Carbon Dioxide (CO2)   Date Value Ref Range Status   01/08/2024 26 22 - 29 mmol/L Final   07/14/2022 29 20 - 32 mmol/L Final     Anion Gap   Date Value Ref Range Status   01/08/2024 11 7 - 15 mmol/L Final   07/14/2022 2 (L) 3 - 14 mmol/L Final   04/23/2021 8 3 - 14 mmol/L Final     Glucose   Date Value Ref Range Status   01/08/2024 95 70 - 99 mg/dL Final   07/14/2022 96 70 - 99 mg/dL Final   04/23/2021 102 (H) 70 - 99 mg/dL Final     Comment:     Non Fasting     Urea Nitrogen   Date Value Ref Range Status   01/08/2024 11.4 8.0 - 23.0 mg/dL Final   07/14/2022 10 7 - 30 mg/dL Final   04/23/2021 12 7 - 30 mg/dL Final     Creatinine   Date Value Ref Range Status   01/08/2024 0.67 0.51 - 0.95 mg/dL Final   04/23/2021 0.65 0.52 - 1.04 mg/dL Final     GFR Estimate   Date Value Ref Range Status   01/08/2024 >90 >60 mL/min/1.73m2 Final   04/23/2021 >90 >60 mL/min/[1.73_m2] Final     Comment:     Non  GFR Calc  Starting 12/18/2018, serum creatinine based estimated GFR (eGFR) will be   calculated using the Chronic Kidney Disease Epidemiology Collaboration   (CKD-EPI) equation.       Calcium   Date Value Ref Range Status   01/08/2024 9.6 8.8 - 10.2 mg/dL Final   04/23/2021 9.5 8.5 - 10.1 mg/dL Final     Bilirubin Total   Date Value Ref Range Status   01/14/2019 0.4 0.2 - 1.3 mg/dL Final     Alkaline Phosphatase   Date Value Ref Range Status   01/14/2019 57 40 - 150 U/L Final     ALT   Date Value Ref Range Status   01/14/2019 30 0 - 50 U/L Final     AST   Date Value Ref Range Status   01/14/2019 29 0 - 45 U/L Final       ASSESSMENT/PLAN:  Maryse Goins is a 63 year old female with the following issues:  1. Stage III, grade 3 invasive ductal carcinoma, ER/MD positive, HER-2 negative  --Maryse is s/p left  mastectomy and left axillary lymph node dissection with 8/14 positive lymph nodes, adjuvant chemo with ddAC-T completed 8/2006, adjuvant radiation 10/2006, followed by anastrozole 9/2006, then switched to tamoxifen 12/2013 through 3/7/2023. She is s/p prophylactic right mastectomy.  --She has no clinical evidence for recurrent breast cancer by physical exam from today.  --I advised continuing her active, healthy lifestyle.      Return in 1 year.    Opal Choi MD  Hematology/Oncology  [facility]    Total time spent today: 20 minutes in chart review, patient evaluation, counseling, documentation, test and/or medication/prescription orders, and coordination of care.        Again, thank you for allowing me to participate in the care of your patient.        Sincerely,        Opal Choi MD

## 2024-04-02 ENCOUNTER — TRANSFERRED RECORDS (OUTPATIENT)
Dept: HEALTH INFORMATION MANAGEMENT | Facility: CLINIC | Age: 64
End: 2024-04-02
Payer: COMMERCIAL

## 2024-07-01 ENCOUNTER — MYC MEDICAL ADVICE (OUTPATIENT)
Dept: FAMILY MEDICINE | Facility: CLINIC | Age: 64
End: 2024-07-01
Payer: COMMERCIAL

## 2024-07-01 DIAGNOSIS — E55.9 VITAMIN D DEFICIENCY: Primary | ICD-10-CM

## 2024-07-01 NOTE — TELEPHONE ENCOUNTER
CW,  Please see below [employer] message and advise.  Pended vitamin D  Fasting lipi, CMP, and lipoprotein A are currently future  Thanks,  Melissa DEAL RN

## 2024-07-01 NOTE — TELEPHONE ENCOUNTER
Added the Vit D.   Would see if she could get the fasting labs scheduled for sometime this week so they are back by 7/10.  Thanks!  CW

## 2024-07-06 ENCOUNTER — LAB (OUTPATIENT)
Dept: LAB | Facility: CLINIC | Age: 64
End: 2024-07-06
Payer: COMMERCIAL

## 2024-07-06 DIAGNOSIS — E55.9 VITAMIN D DEFICIENCY: ICD-10-CM

## 2024-07-06 DIAGNOSIS — E78.5 HYPERLIPIDEMIA LDL GOAL <130: ICD-10-CM

## 2024-07-06 DIAGNOSIS — I10 BENIGN ESSENTIAL HYPERTENSION: ICD-10-CM

## 2024-07-06 LAB
ALBUMIN SERPL BCG-MCNC: 4.6 G/DL (ref 3.5–5.2)
ALP SERPL-CCNC: 80 U/L (ref 40–150)
ALT SERPL W P-5'-P-CCNC: 19 U/L (ref 0–50)
ANION GAP SERPL CALCULATED.3IONS-SCNC: 13 MMOL/L (ref 7–15)
APO A-I SERPL-MCNC: 17 MG/DL
AST SERPL W P-5'-P-CCNC: 33 U/L (ref 0–45)
BILIRUB SERPL-MCNC: 0.5 MG/DL
BUN SERPL-MCNC: 7.7 MG/DL (ref 8–23)
CALCIUM SERPL-MCNC: 9.7 MG/DL (ref 8.8–10.2)
CHLORIDE SERPL-SCNC: 102 MMOL/L (ref 98–107)
CHOLEST SERPL-MCNC: 299 MG/DL
CREAT SERPL-MCNC: 0.56 MG/DL (ref 0.51–0.95)
DEPRECATED HCO3 PLAS-SCNC: 25 MMOL/L (ref 22–29)
EGFRCR SERPLBLD CKD-EPI 2021: >90 ML/MIN/1.73M2
FASTING STATUS PATIENT QL REPORTED: YES
FASTING STATUS PATIENT QL REPORTED: YES
GLUCOSE SERPL-MCNC: 90 MG/DL (ref 70–99)
HDLC SERPL-MCNC: 69 MG/DL
LDLC SERPL CALC-MCNC: 186 MG/DL
NONHDLC SERPL-MCNC: 230 MG/DL
POTASSIUM SERPL-SCNC: 4.3 MMOL/L (ref 3.4–5.3)
PROT SERPL-MCNC: 7.1 G/DL (ref 6.4–8.3)
SODIUM SERPL-SCNC: 140 MMOL/L (ref 135–145)
TRIGL SERPL-MCNC: 220 MG/DL
VIT D+METAB SERPL-MCNC: 105 NG/ML (ref 20–50)

## 2024-07-06 PROCEDURE — 83695 ASSAY OF LIPOPROTEIN(A): CPT

## 2024-07-06 PROCEDURE — 80053 COMPREHEN METABOLIC PANEL: CPT

## 2024-07-06 PROCEDURE — 80061 LIPID PANEL: CPT

## 2024-07-06 PROCEDURE — 36415 COLL VENOUS BLD VENIPUNCTURE: CPT

## 2024-07-06 PROCEDURE — 82306 VITAMIN D 25 HYDROXY: CPT

## 2024-07-10 ENCOUNTER — OFFICE VISIT (OUTPATIENT)
Dept: FAMILY MEDICINE | Facility: CLINIC | Age: 64
End: 2024-07-10
Attending: FAMILY MEDICINE
Payer: COMMERCIAL

## 2024-07-10 VITALS
OXYGEN SATURATION: 98 % | TEMPERATURE: 97.4 F | SYSTOLIC BLOOD PRESSURE: 138 MMHG | BODY MASS INDEX: 22.66 KG/M2 | HEIGHT: 61 IN | DIASTOLIC BLOOD PRESSURE: 74 MMHG | RESPIRATION RATE: 20 BRPM | WEIGHT: 120 LBS | HEART RATE: 69 BPM

## 2024-07-10 DIAGNOSIS — I10 BENIGN ESSENTIAL HYPERTENSION: ICD-10-CM

## 2024-07-10 DIAGNOSIS — C50.412 MALIGNANT NEOPLASM OF UPPER-OUTER QUADRANT OF LEFT BREAST IN FEMALE, ESTROGEN RECEPTOR POSITIVE (H): ICD-10-CM

## 2024-07-10 DIAGNOSIS — Z11.3 SCREEN FOR STD (SEXUALLY TRANSMITTED DISEASE): ICD-10-CM

## 2024-07-10 DIAGNOSIS — Z00.00 ROUTINE GENERAL MEDICAL EXAMINATION AT A HEALTH CARE FACILITY: Primary | ICD-10-CM

## 2024-07-10 DIAGNOSIS — Z78.9 VEGAN DIET: ICD-10-CM

## 2024-07-10 DIAGNOSIS — E78.5 HYPERLIPIDEMIA LDL GOAL <130: ICD-10-CM

## 2024-07-10 DIAGNOSIS — Z17.0 MALIGNANT NEOPLASM OF UPPER-OUTER QUADRANT OF LEFT BREAST IN FEMALE, ESTROGEN RECEPTOR POSITIVE (H): ICD-10-CM

## 2024-07-10 PROCEDURE — 99396 PREV VISIT EST AGE 40-64: CPT | Performed by: FAMILY MEDICINE

## 2024-07-10 RX ORDER — LISINOPRIL 20 MG/1
20 TABLET ORAL DAILY
Qty: 90 TABLET | Refills: 1 | Status: SHIPPED | OUTPATIENT
Start: 2024-07-10

## 2024-07-10 SDOH — HEALTH STABILITY: PHYSICAL HEALTH: ON AVERAGE, HOW MANY DAYS PER WEEK DO YOU ENGAGE IN MODERATE TO STRENUOUS EXERCISE (LIKE A BRISK WALK)?: 4 DAYS

## 2024-07-10 SDOH — HEALTH STABILITY: PHYSICAL HEALTH: ON AVERAGE, HOW MANY MINUTES DO YOU ENGAGE IN EXERCISE AT THIS LEVEL?: 50 MIN

## 2024-07-10 ASSESSMENT — PAIN SCALES - GENERAL: PAINLEVEL: NO PAIN (0)

## 2024-07-10 ASSESSMENT — SOCIAL DETERMINANTS OF HEALTH (SDOH): HOW OFTEN DO YOU GET TOGETHER WITH FRIENDS OR RELATIVES?: TWICE A WEEK

## 2024-07-10 NOTE — PROGRESS NOTES
Preventive Care Visit  [facility]  Emily El MD, Family Medicine  Jul 10, 2024       Assessment & Plan     Routine general medical examination at a health care facility  Discussed diet, calcium and exercise.  Thin prep pap was not done.  Eye and dental care UTD or recommended f/u.  No immunizations needed today.  See orders below for tests ordered and screening needed.   - PRIMARY CARE FOLLOW-UP SCHEDULING; Future    Hyperlipidemia LDL goal <130/  Vegan diet  Unfortunately, minimal change in lipid panel (LDL just slightly down from 194 to 186) despite significant improvement in diet/exercise in the past 3 months.  Exercise was already good, but added a couple hrs more of aerobic exercise.  Diet change from fair amount of meat daily to almost none, and much more vegetables/legumes/plant-based variety.  Still with minimal processed foods.  ----Rec lipid recheck in ~6 months, continue diet/exercise changes, but check protein levels.  ----Check CT coronary calcium scan- video visit if >0 to consider statins sooner than at 6 month follow-up.  - CT Coronary Calcium Scan; Future  - Lipid panel reflex to direct LDL Fasting; Future     Benign essential hypertension  Blood pressure in good control on current medication/s.  No side effects. Labs UTD.  Will continue current meds, refills sent.  Continue every six month follow-up.    - lisinopril (ZESTRIL) 20 MG tablet; Take 1 tablet (20 mg) by mouth daily    Malignant neoplasm of upper-outer quadrant of left breast in female, estrogen receptor positive (H)  Stable.  Cont follow-up with oncology.    Screen for STD (sexually transmitted disease)  New partner.  No sx's.  - NEISSERIA GONORRHOEA PCR; Future  - CHLAMYDIA TRACHOMATIS PCR; Future  - HIV Antigen Antibody Combo; Future  - Treponema Abs w Reflex to RPR and Titer; Future    Patient has been advised of split billing requirements and indicates understanding:  Yes        Counseling  Appropriate preventive services were discussed with this patient, including applicable screening as appropriate for fall prevention, nutrition, physical activity, Tobacco-use cessation, weight loss and cognition.  Checklist reviewing preventive services available has been given to the patient.  Reviewed patient's diet, addressing concerns and/or questions.        Follow-up Visit   Expected date:  Wes 10, 2025 (Approximate)      Follow Up Appointment Details:     Follow-up with whom?: Me    Follow-Up for what?: Chronic Disease f/u    Chronic Disease f/u: Hypertension    How?: In Person             Follow-up Visit   Expected date:  Jul 10, 2025 (Approximate)      Follow Up Appointment Details:     Follow-up with whom?: PCP    Follow-Up for what?: Adult Preventive    How?: In Person                             Subjective   Maryse is a 64 year old, presenting for the following:  Physical        7/10/2024     9:26 AM   Additional Questions   Roomed by leatha   Accompanied by n/.a         7/10/2024     9:26 AM   Patient Reported Additional Medications   Patient reports taking the following new medications n/a        Health Care Directive  Patient does not have a Health Care Directive or Living Will: Discussed advance care planning with patient; information given to patient to review.    HPI      HTN- BPs at home, max 135/82, otherwise pretty variable.  120s to low 130s.    Reviewed recent labs....  Recent Labs   Lab Test 07/06/24  1055 01/10/24  0959   CHOL 299* 308*   HDL 69 69   * 194*   TRIG 220* 225*        Lipids- made lots of changes/improvements in diet and exercise, so she was very disappointed her LDL wasn't better.    Exercising more- already doing a mix of cardio/strength, but she added another couple hrs a week of cardio.    Diet changes-   Since March, she's been on 95% vegan diet.  Watched [employer].  Vegans vs Carnivores.  Eat to Live- read some of it, focused on the  take-aways.  Lactose intolerant sx's - so stopped dairy about a year ago.    Main diet changes the last three months...   -More fruits/vegetables, more grains/nuts.  -Smoothies went from fruit/veg to dairy alternative, added greens, dates.  -Cut out meat- from red meat 3-4x/month, chicken/fish - 1-2 servings/day to hardly any meat    Main diet changes- much more vegetables, more variety, more grains/nuts, much less meat. Very rare processed foods.  [employer] salads really helped the transition, helpful to get the variety in foods without meat.  Only reason she may stray if she's out to dinner.    Feels great- more energy, skin feels better, less joint issues (less shoulder pains, ortho dx osteoarthritis, less am aching).    Has list for protein- feels like she is getting enough, but will check closer.    She is taking a B12 and fish oil supplement.    The 10-year ASCVD risk score (Missy WILLAMS, et al., 2019) is: 8.8%    Values used to calculate the score:      Age: 64 years      Sex: Female      Is Non- : No      Diabetic: No      Tobacco smoker: No      Systolic Blood Pressure: 138 mmHg      Is BP treated: Yes      HDL Cholesterol: 69 mg/dL      Total Cholesterol: 299 mg/dL               7/10/2024   General Health   How would you rate your overall physical health? Good   Feel stress (tense, anxious, or unable to sleep) Not at all            7/10/2024   Nutrition   Three or more servings of calcium each day? Yes   Diet: Vegetarian/vegan   How many servings of fruit and vegetables per day? 4 or more   How many sweetened beverages each day? 0-1            7/10/2024   Exercise   Days per week of moderate/strenous exercise 4 days   Average minutes spent exercising at this level 50 min            7/10/2024   Social Factors   Frequency of gathering with friends or relatives Twice a week   Worry food won't last until get money to buy more No    No   Food not last or not have enough money for food?  No    No   Do you have housing? (Housing is defined as stable permanent housing and does not include staying ouside in a car, in a tent, in an abandoned building, in an overnight shelter, or couch-surfing.) Yes    Yes   Are you worried about losing your housing? No    No   Lack of transportation? No    No   Unable to get utilities (heat,electricity)? No    No       Multiple values from one day are sorted in reverse-chronological order         7/10/2024   Fall Risk   Fallen 2 or more times in the past year? No   Trouble with walking or balance? No             7/10/2024   Dental   Dentist two times every year? Yes            7/10/2024   TB Screening   Were you born outside of the [place]? No              Today's PHQ-2 Score:       1/10/2024     8:52 AM   PHQ-2 (  [employer])   Q1: Little interest or pleasure in doing things 0   Q2: Feeling down, depressed or hopeless 0   PHQ-2 Score 0   Q1: Little interest or pleasure in doing things Not at all   Q2: Feeling down, depressed or hopeless Not at all   PHQ-2 Score 0         7/10/2024   Substance Use   Alcohol more than 3/day or more than 7/wk No   Do you use any other substances recreationally? No        Social History     Tobacco Use    Smoking status: Former     Current packs/day: 0.00     Types: Cigarettes     Quit date: 1993     Years since quittin.6    Smokeless tobacco: Never    Tobacco comments:     15 yrs ago   Vaping Use    Vaping status: Never Used   Substance Use Topics    Alcohol use: Yes     Alcohol/week: 0.0 standard drinks of alcohol     Comment: 4-6  drinks weekly    Drug use: No           3/15/2021   LAST FHS-7 RESULTS   1st degree relative breast or ovarian cancer Yes   Any relative bilateral breast cancer No   Any male have breast cancer No   Any ONE woman have BOTH breast AND ovarian cancer No   Any woman with breast cancer before 50yrs No   2 or more relatives with breast AND/OR ovarian cancer No   2 or more relatives with breast AND/OR bowel  "cancer No           Mammogram Screening - Mammogram every 1-2 years updated in Health Maintenance based on mutual decision making        7/10/2024   STI Screening   New sexual partner(s) since last STI/HIV test? (!) YES         History of abnormal Pap smear: No - age 30-64 HPV with reflex Pap every 5 years recommended       ASCVD Risk   The 10-year ASCVD risk score (Missy WILLAMS, et al., 2019) is: 8.8%    Values used to calculate the score:      Age: 64 years      Sex: Female      Is Non- : No      Diabetic: No      Tobacco smoker: No      Systolic Blood Pressure: 138 mmHg      Is BP treated: Yes      HDL Cholesterol: 69 mg/dL      Total Cholesterol: 299 mg/dL           Reviewed and updated as needed this visit by Provider   Tobacco  Allergies  Meds  Problems  Med Hx  Surg Hx  Fam Hx            Lab work is in process  Labs reviewed in EPIC  BP Readings from Last 3 Encounters:   07/10/24 138/74   03/11/24 (!) 142/87   01/10/24 130/80    Wt Readings from Last 3 Encounters:   07/10/24 54.4 kg (120 lb)   03/11/24 57.4 kg (126 lb 9.6 oz)   01/10/24 56.1 kg (123 lb 11.2 oz)                      Review of Systems  Constitutional, neuro, ENT, endocrine, pulmonary, cardiac, gastrointestinal, genitourinary, musculoskeletal, integument and psychiatric systems are negative, except as otherwise noted.     Objective    Exam  /74 (BP Location: Left arm, Patient Position: Sitting, Cuff Size: Adult Regular)   Pulse 69   Temp 97.4  F (36.3  C) (Temporal)   Resp 20   Ht 1.549 m (5' 1\")   Wt 54.4 kg (120 lb)   SpO2 98%   BMI 22.67 kg/m     Estimated body mass index is 22.67 kg/m  as calculated from the following:    Height as of this encounter: 1.549 m (5' 1\").    Weight as of this encounter: 54.4 kg (120 lb).    Physical Exam  GENERAL: alert and no distress  EYES: Eyes grossly normal to inspection, PERRL and conjunctivae and sclerae normal  HENT: ear canals and TM's normal, nose and " mouth without ulcers or lesions  NECK: no adenopathy, no asymmetry, masses, or scars  RESP: lungs clear to auscultation - no rales, rhonchi or wheezes  BREAST: normal without masses, tenderness or nipple discharge and no palpable axillary masses or adenopathy  CV: regular rate and rhythm, normal S1 S2, no S3 or S4, no murmur, click or rub, no peripheral edema  ABDOMEN: soft, nontender, no hepatosplenomegaly, no masses and bowel sounds normal  MS: no gross musculoskeletal defects noted, no edema  SKIN: no suspicious lesions or rashes  NEURO: Normal strength and tone, mentation intact and speech normal  PSYCH: mentation appears normal, affect normal/bright        Signed Electronically by: Emily El MD

## 2024-07-10 NOTE — PATIENT INSTRUCTIONS
Patient Education   Preventive Care Advice   This is general advice given by our system to help you stay healthy. However, your care team may have specific advice just for you. Please talk to your care team about your preventive care needs.  Nutrition  Eat 5 or more servings of fruits and vegetables each day.  Try wheat bread, brown rice and whole grain pasta (instead of white bread, rice, and pasta).  Get enough calcium and vitamin D. Check the label on foods and aim for 100% of the RDA (recommended daily allowance).  Lifestyle  Exercise at least 150 minutes each week  (30 minutes a day, 5 days a week).  Do muscle strengthening activities 2 days a week. These help control your weight and prevent disease.  No smoking.  Wear sunscreen to prevent skin cancer.  Have a dental exam and cleaning every 6 months.  Yearly exams  See your health care team every year to talk about:  Any changes in your health.  Any medicines your care team has prescribed.  Preventive care, family planning, and ways to prevent chronic diseases.  Shots (vaccines)   HPV shots (up to age 26), if you've never had them before.  Hepatitis B shots (up to age 59), if you've never had them before.  COVID-19 shot: Get this shot when it's due.  Flu shot: Get a flu shot every year.  Tetanus shot: Get a tetanus shot every 10 years.  Pneumococcal, hepatitis A, and RSV shots: Ask your care team if you need these based on your risk.  Shingles shot (for age 50 and up)  General health tests  Diabetes screening:  Starting at age 35, Get screened for diabetes at least every 3 years.  If you are younger than age 35, ask your care team if you should be screened for diabetes.  Cholesterol test: At age 39, start having a cholesterol test every 5 years, or more often if advised.  Bone density scan (DEXA): At age 50, ask your care team if you should have this scan for osteoporosis (brittle bones).  Hepatitis C: Get tested at least once in your life.  STIs (sexually  transmitted infections)  Before age 24: Ask your care team if you should be screened for STIs.  After age 24: Get screened for STIs if you're at risk. You are at risk for STIs (including HIV) if:  You are sexually active with more than one person.  You don't use condoms every time.  You or a partner was diagnosed with a sexually transmitted infection.  If you are at risk for HIV, ask about PrEP medicine to prevent HIV.  Get tested for HIV at least once in your life, whether you are at risk for HIV or not.  Cancer screening tests  Cervical cancer screening: If you have a cervix, begin getting regular cervical cancer screening tests starting at age 21.  Breast cancer scan (mammogram): If you've ever had breasts, begin having regular mammograms starting at age 40. This is a scan to check for breast cancer.  Colon cancer screening: It is important to start screening for colon cancer at age 45.  Have a colonoscopy test every 10 years (or more often if you're at risk) Or, ask your provider about stool tests like a FIT test every year or Cologuard test every 3 years.  To learn more about your testing options, visit:   .  For help making a decision, visit:   https://bit.ly/tl54654.  Prostate cancer screening test: If you have a prostate, ask your care team if a prostate cancer screening test (PSA) at age 55 is right for you.  Lung cancer screening: If you are a current or former smoker ages 50 to 80, ask your care team if ongoing lung cancer screenings are right for you.  For informational purposes only. Not to replace the advice of your health care provider. Copyright   2023 [facility]. All rights reserved. Clinically reviewed by the [employer] [facility]. [place] [id number] - REV 01/24.  Safer Sex: Care Instructions  Overview  Safer sex is a way to reduce your risk of getting a sexually transmitted infection (STI). It can also help prevent pregnancy.  Several products can help you practice  safer sex and reduce your chance of STIs. One of the best is a condom. There are internal and external condoms. You can use a special rubber sheet (dental dam) for protection during oral sex. Disposable gloves can keep your hands from touching blood, semen, or other body fluids that can carry infections.  Remember that birth control methods such as diaphragms, IUDs, foams, and birth control pills do not stop you from getting STIs.  Follow-up care is a key part of your treatment and safety. Be sure to make and go to all appointments, and call your doctor if you are having problems. It's also a good idea to know your test results and keep a list of the medicines you take.  How can you care for yourself at home?  Think about getting vaccinated to help prevent hepatitis A, hepatitis B, and human papillomavirus (HPV). They can be spread through sex.  Use a condom every time you have sex. Use an external condom, which goes on the penis. Or use an internal condom, which goes into the vagina or anus.  Make sure you use the right size external condom. A condom that's too small can break easily. A condom that's too big can slip off during sex.  Use a new condom each time you have sex. Be careful not to poke a hole in the condom when you open the wrapper.  Don't use an internal condom and an external condom at the same time.  Never use petroleum jelly (such as Vaseline), grease, hand lotion, baby oil, or anything with oil in it. These products can make holes in the condom.  After intercourse, hold the edge of the condom as you remove it. This will help keep semen from spilling out of the condom.  Do not have sex with anyone who has symptoms of an STI, such as sores on the genitals or mouth.  Do not drink a lot of alcohol or use drugs before sex.  Limit your sex partners. Sex with one partner who has sex only with you can reduce your risk of getting an STI.  Don't share sex toys. But if you do share them, use a condom and clean  "the sex toys between each use.  Talk to any partners before you have sex. Talk about what you feel comfortable with and whether you have any boundaries with sex. And find out if your partner or partners may be at risk for any STI. Keep in mind that a person may be able to spread an STI even if they do not have symptoms. You and any partners may want to get tested for STIs.  Where can you learn more?  Go to https://www.[employer].net/patiented  Enter B608 in the search box to learn more about \"Safer Sex: Care Instructions.\"  Current as of: November 27, 2023               Content Version: 14.0    9575-9377 [employer].   Care instructions adapted under license by your healthcare professional. If you have questions about a medical condition or this instruction, always ask your healthcare professional. [employer] disclaims any warranty or liability for your use of this information.         "

## 2024-07-11 PROBLEM — Z78.9 VEGAN DIET: Status: ACTIVE | Noted: 2024-07-11

## 2024-07-11 PROBLEM — E78.5 HYPERLIPIDEMIA LDL GOAL <130: Status: ACTIVE | Noted: 2024-07-11

## 2024-09-09 ENCOUNTER — TRANSFERRED RECORDS (OUTPATIENT)
Dept: HEALTH INFORMATION MANAGEMENT | Facility: CLINIC | Age: 64
End: 2024-09-09
Payer: COMMERCIAL

## 2024-10-26 ENCOUNTER — OFFICE VISIT (OUTPATIENT)
Dept: URGENT CARE | Facility: URGENT CARE | Age: 64
End: 2024-10-26
Payer: COMMERCIAL

## 2024-10-26 VITALS
RESPIRATION RATE: 16 BRPM | DIASTOLIC BLOOD PRESSURE: 72 MMHG | OXYGEN SATURATION: 99 % | SYSTOLIC BLOOD PRESSURE: 133 MMHG | BODY MASS INDEX: 23.62 KG/M2 | TEMPERATURE: 98.3 F | WEIGHT: 125 LBS | HEART RATE: 80 BPM

## 2024-10-26 DIAGNOSIS — J01.11 ACUTE RECURRENT FRONTAL SINUSITIS: Primary | ICD-10-CM

## 2024-10-26 PROCEDURE — 99213 OFFICE O/P EST LOW 20 MIN: CPT | Performed by: PHYSICIAN ASSISTANT

## 2024-10-26 NOTE — PROGRESS NOTES
Assessment & Plan     1. Acute recurrent frontal sinusitis (Primary)    - amoxicillin-clavulanate (AUGMENTIN) 875-125 MG tablet; Take 1 tablet by mouth 2 times daily for 7 days.  Dispense: 14 tablet; Refill: 0     Saline, steroid nasal spray, cool mist humidifier. Follow up if not improving over the next week at PCP clinic.                   COLE Mueller [facility] RUSH Serra is a 64 year old female who presents to clinic today for the following health issues:  Chief Complaint   Patient presents with    Urgent Care    Sinus Problem     Pt reports being Covid positive 2 weeks ago. Present to the clinic with sinus congestion, phlegmy cough  and fatigue x 15 days  Tried sudafed with some relief       HPI    Here for sinus concern. Stuffed up in nasal passages for the past 2 weeks. No tooth pain. Post nasal draining. No fever. Fatigued and not really improving since COVID diagnosis. All other symptoms have improved but nasal congestion. No ear pain now. No sore throat. No chronic sinus infections. Sudafed gives the most relief. Fluticasone nasal spray she uses daily.           Review of Systems        Objective    /72 (BP Location: Right arm, Patient Position: Sitting, Cuff Size: Adult Regular)   Pulse 80   Temp 98.3  F (36.8  C) (Oral)   Resp 16   Wt 56.7 kg (125 lb)   SpO2 99%   Breastfeeding No   BMI 23.62 kg/m    Physical Exam  Vitals and nursing note reviewed.   Constitutional:       General: She is not in acute distress.     Appearance: She is well-developed. She is not diaphoretic.   HENT:      Head: Normocephalic and atraumatic.      Right Ear: Tympanic membrane and external ear normal.      Left Ear: Tympanic membrane and external ear normal.      Nose: Congestion present.      Right Turbinates: Swollen.      Left Turbinates: Swollen.      Right Sinus: Maxillary sinus tenderness and frontal sinus tenderness present.      Left Sinus: Maxillary sinus  tenderness and frontal sinus tenderness present.      Mouth/Throat:      Pharynx: No posterior oropharyngeal erythema.   Eyes:      Pupils: Pupils are equal, round, and reactive to light.   Cardiovascular:      Rate and Rhythm: Normal rate and regular rhythm.   Pulmonary:      Effort: Pulmonary effort is normal. No respiratory distress.      Breath sounds: Normal breath sounds.   Musculoskeletal:      Cervical back: Normal range of motion and neck supple.   Lymphadenopathy:      Cervical: No cervical adenopathy.   Neurological:      Mental Status: She is alert.

## 2025-01-11 DIAGNOSIS — I10 BENIGN ESSENTIAL HYPERTENSION: ICD-10-CM

## 2025-01-13 RX ORDER — LISINOPRIL 20 MG/1
20 TABLET ORAL DAILY
Qty: 90 TABLET | Refills: 0 | Status: SHIPPED | OUTPATIENT
Start: 2025-01-13

## 2025-04-14 DIAGNOSIS — I10 BENIGN ESSENTIAL HYPERTENSION: ICD-10-CM

## 2025-04-14 RX ORDER — LISINOPRIL 20 MG/1
20 TABLET ORAL DAILY
Qty: 90 TABLET | Refills: 0 | Status: SHIPPED | OUTPATIENT
Start: 2025-04-14

## 2025-06-10 ENCOUNTER — PATIENT OUTREACH (OUTPATIENT)
Dept: CARE COORDINATION | Facility: CLINIC | Age: 65
End: 2025-06-10
Payer: COMMERCIAL

## 2025-06-19 ENCOUNTER — DOCUMENTATION ONLY (OUTPATIENT)
Dept: OTHER | Facility: CLINIC | Age: 65
End: 2025-06-19
Payer: COMMERCIAL

## 2025-07-15 ENCOUNTER — MYC REFILL (OUTPATIENT)
Dept: FAMILY MEDICINE | Facility: CLINIC | Age: 65
End: 2025-07-15
Payer: COMMERCIAL

## 2025-07-15 DIAGNOSIS — I10 BENIGN ESSENTIAL HYPERTENSION: ICD-10-CM

## 2025-07-16 DIAGNOSIS — I10 BENIGN ESSENTIAL HYPERTENSION: ICD-10-CM

## 2025-07-16 RX ORDER — LISINOPRIL 20 MG/1
20 TABLET ORAL DAILY
Qty: 90 TABLET | Refills: 0 | Status: SHIPPED | OUTPATIENT
Start: 2025-07-16

## 2025-07-16 RX ORDER — LISINOPRIL 20 MG/1
20 TABLET ORAL DAILY
Qty: 90 TABLET | Refills: 0 | OUTPATIENT
Start: 2025-07-16

## (undated) DEVICE — SUCTION TIP YANKAUER W/O VENT K86

## (undated) DEVICE — GLOVE PROTEXIS BLUE W/NEU-THERA 6.5  2D73EB65

## (undated) DEVICE — DRSG GAUZE 4X4" 3033

## (undated) DEVICE — SPONGE BALL KERLIX ROUND XL W/O STRING LATEX 4935

## (undated) DEVICE — SYR 10ML SLIP TIP W/O NDL

## (undated) DEVICE — NDL 27GA 1.25" 305136

## (undated) DEVICE — SYR 10ML FINGER CONTROL W/O NDL 309695

## (undated) DEVICE — GLOVE PROTEXIS W/NEU-THERA 6.0  2D73TE60

## (undated) DEVICE — SOL NACL 0.9% IRRIG 1000ML BOTTLE 07138-09

## (undated) DEVICE — DRAPE PEDS  LAP 29493

## (undated) DEVICE — LINEN TOWEL PACK X5 5464

## (undated) DEVICE — PACK MINOR SBA15MIFSE

## (undated) DEVICE — SU CHROMIC 3-0 SH 27" G122H

## (undated) DEVICE — SPONGE RAY-TEC 3X3" 30-094

## (undated) RX ORDER — FENTANYL CITRATE 50 UG/ML
INJECTION, SOLUTION INTRAMUSCULAR; INTRAVENOUS
Status: DISPENSED
Start: 2017-11-09

## (undated) RX ORDER — EPINEPHRINE 1 MG/ML
INJECTION, SOLUTION INTRAMUSCULAR; SUBCUTANEOUS
Status: DISPENSED
Start: 2017-11-09

## (undated) RX ORDER — ACETAMINOPHEN 325 MG/1
TABLET ORAL
Status: DISPENSED
Start: 2017-11-09

## (undated) RX ORDER — LIDOCAINE HYDROCHLORIDE 10 MG/ML
INJECTION, SOLUTION INFILTRATION; PERINEURAL
Status: DISPENSED
Start: 2017-11-09

## (undated) RX ORDER — ONDANSETRON 2 MG/ML
INJECTION INTRAMUSCULAR; INTRAVENOUS
Status: DISPENSED
Start: 2017-11-09

## (undated) RX ORDER — BUPIVACAINE HYDROCHLORIDE AND EPINEPHRINE 5; 5 MG/ML; UG/ML
INJECTION, SOLUTION EPIDURAL; INTRACAUDAL; PERINEURAL
Status: DISPENSED
Start: 2017-11-09

## (undated) RX ORDER — PROPOFOL 10 MG/ML
INJECTION, EMULSION INTRAVENOUS
Status: DISPENSED
Start: 2017-11-09

## (undated) RX ORDER — DEXAMETHASONE SODIUM PHOSPHATE 4 MG/ML
INJECTION, SOLUTION INTRA-ARTICULAR; INTRALESIONAL; INTRAMUSCULAR; INTRAVENOUS; SOFT TISSUE
Status: DISPENSED
Start: 2017-11-09

## (undated) RX ORDER — BUPIVACAINE HYDROCHLORIDE 2.5 MG/ML
INJECTION, SOLUTION EPIDURAL; INFILTRATION; INTRACAUDAL
Status: DISPENSED
Start: 2017-11-09

## (undated) RX ORDER — FENTANYL CITRATE 50 UG/ML
INJECTION, SOLUTION INTRAMUSCULAR; INTRAVENOUS
Status: DISPENSED
Start: 2017-06-09